# Patient Record
Sex: FEMALE | Race: BLACK OR AFRICAN AMERICAN | Employment: UNEMPLOYED | URBAN - METROPOLITAN AREA
[De-identification: names, ages, dates, MRNs, and addresses within clinical notes are randomized per-mention and may not be internally consistent; named-entity substitution may affect disease eponyms.]

---

## 2017-01-05 ENCOUNTER — PATIENT OUTREACH (OUTPATIENT)
Dept: FAMILY MEDICINE CLINIC | Age: 52
End: 2017-01-05

## 2017-01-05 DIAGNOSIS — I10 ESSENTIAL HYPERTENSION: Primary | ICD-10-CM

## 2017-01-05 NOTE — PROGRESS NOTES
Follow up Chronic Condition    Focus: Diabetes, Hypertension & Asthma    Referred by High Risk Registry. Pt has multiple comorbidities and has had confusion with her medications previously. DM- Last DM follow up was in March 2016 and last A1C was 6.4 in February 2016. Pt reports multiple possible symptoms of high blood sugar. Pt reports she has had a sore above her right ankle for several months now. Pt has run out of lancets and test strips to check her glucose. States she checked a week ago and BG was 159. Pt has concerns about potential side effects of metformin. HTN- Pt is taking her BP meds as prescribed. Requests clonidine and lovastatin refills. Will route request to provider. Pt not checking her BP. Pt reports since starting the spironolactone, she has not had any peripheral edema. Asthma- Pt states she was diagnosed with asthma in 2009. States she smoked for 26 years, quit date in 2010. Pt was approved to receive Advair 250/50 and Ventolin through the pharmacy assistance program in September 2016 but states she only received one shipment to her home. Emailed Karlos Hinojosa to send refills and gave pt phone number to order future refills. Pt states she needs yearly mammograms. She tried to schedule one where she usually goes at [facility] but was not allowed. Will follow up with provider to place order. Follow up appt with provider scheduled for 1/30/17 at 1330. Advised pt to address sore on leg with , to be sure to ask for diabetes supplies, and to address metformin concerns with .     Patient has my number if questions arise.

## 2017-01-07 RX ORDER — CLONIDINE HYDROCHLORIDE 0.1 MG/1
TABLET ORAL
Qty: 60 TAB | Refills: 3 | Status: SHIPPED | OUTPATIENT
Start: 2017-01-07 | End: 2017-01-30 | Stop reason: SDUPTHER

## 2017-01-07 RX ORDER — LOVASTATIN 20 MG/1
20 TABLET ORAL
Qty: 30 TAB | Refills: 3 | Status: SHIPPED | OUTPATIENT
Start: 2017-01-07 | End: 2017-01-30 | Stop reason: SDUPTHER

## 2017-01-12 ENCOUNTER — PATIENT OUTREACH (OUTPATIENT)
Dept: FAMILY MEDICINE CLINIC | Age: 52
End: 2017-01-12

## 2017-01-12 DIAGNOSIS — E13.9 DIABETES 1.5, MANAGED AS TYPE 2 (HCC): ICD-10-CM

## 2017-01-12 NOTE — PROGRESS NOTES
Called pt to inform her that an order was placed for her mammogram 8/17/16, so she should be able to call and schedule now. Gave pt number to call and pt states she will call now to schedule and will call me back if she has any issues. Pt picked up her clonidine and lovastatin. Requests metformin refill. Request routed to provider.

## 2017-01-13 RX ORDER — METFORMIN HYDROCHLORIDE 1000 MG/1
1000 TABLET ORAL 2 TIMES DAILY WITH MEALS
Qty: 60 TAB | Refills: 3 | Status: SHIPPED | OUTPATIENT
Start: 2017-01-13 | End: 2017-01-30 | Stop reason: SDUPTHER

## 2017-01-30 ENCOUNTER — OFFICE VISIT (OUTPATIENT)
Dept: FAMILY MEDICINE CLINIC | Age: 52
End: 2017-01-30

## 2017-01-30 VITALS
HEART RATE: 96 BPM | BODY MASS INDEX: 30.52 KG/M2 | HEIGHT: 71 IN | OXYGEN SATURATION: 100 % | RESPIRATION RATE: 16 BRPM | WEIGHT: 218 LBS | DIASTOLIC BLOOD PRESSURE: 91 MMHG | SYSTOLIC BLOOD PRESSURE: 128 MMHG | TEMPERATURE: 98 F

## 2017-01-30 DIAGNOSIS — R60.9 EDEMA, UNSPECIFIED TYPE: ICD-10-CM

## 2017-01-30 DIAGNOSIS — G44.329 CHRONIC POST-TRAUMATIC HEADACHE, NOT INTRACTABLE: ICD-10-CM

## 2017-01-30 DIAGNOSIS — Z00.00 ROUTINE CHECK-UP: Primary | ICD-10-CM

## 2017-01-30 DIAGNOSIS — I10 ESSENTIAL HYPERTENSION WITH GOAL BLOOD PRESSURE LESS THAN 130/85: ICD-10-CM

## 2017-01-30 DIAGNOSIS — E13.9 DIABETES 1.5, MANAGED AS TYPE 2 (HCC): ICD-10-CM

## 2017-01-30 DIAGNOSIS — M10.9 GOUT, UNSPECIFIED CAUSE, UNSPECIFIED CHRONICITY, UNSPECIFIED SITE: ICD-10-CM

## 2017-01-30 LAB — GLUCOSE POC: 102 MG/DL

## 2017-01-30 RX ORDER — NORTRIPTYLINE HYDROCHLORIDE 50 MG/1
50 CAPSULE ORAL
Qty: 30 CAP | Refills: 5 | Status: SHIPPED | OUTPATIENT
Start: 2017-01-30 | End: 2017-12-22 | Stop reason: SDUPTHER

## 2017-01-30 RX ORDER — CLONIDINE HYDROCHLORIDE 0.1 MG/1
TABLET ORAL
Qty: 60 TAB | Refills: 3 | Status: SHIPPED | OUTPATIENT
Start: 2017-01-30 | End: 2017-10-19 | Stop reason: SDUPTHER

## 2017-01-30 RX ORDER — NORTRIPTYLINE HYDROCHLORIDE 75 MG/1
75 CAPSULE ORAL DAILY
Qty: 60 CAP | Refills: 5 | Status: SHIPPED | OUTPATIENT
Start: 2017-01-30 | End: 2017-12-22 | Stop reason: SDUPTHER

## 2017-01-30 RX ORDER — METFORMIN HYDROCHLORIDE 1000 MG/1
1000 TABLET ORAL 2 TIMES DAILY WITH MEALS
Qty: 60 TAB | Refills: 3 | Status: SHIPPED | OUTPATIENT
Start: 2017-01-30 | End: 2017-05-21 | Stop reason: DRUGHIGH

## 2017-01-30 RX ORDER — ONDANSETRON HYDROCHLORIDE 8 MG/1
8 TABLET, FILM COATED ORAL
Qty: 12 TAB | Refills: 1 | Status: SHIPPED | OUTPATIENT
Start: 2017-01-30 | End: 2017-03-31 | Stop reason: SDUPTHER

## 2017-01-30 RX ORDER — LOVASTATIN 20 MG/1
20 TABLET ORAL
Qty: 30 TAB | Refills: 3 | Status: SHIPPED | OUTPATIENT
Start: 2017-01-30 | End: 2017-06-30 | Stop reason: SDUPTHER

## 2017-01-30 RX ORDER — ALLOPURINOL 100 MG/1
100 TABLET ORAL DAILY
Qty: 30 TAB | Refills: 5 | Status: SHIPPED | OUTPATIENT
Start: 2017-01-30 | End: 2017-12-22 | Stop reason: SDUPTHER

## 2017-01-30 RX ORDER — SPIRONOLACTONE 50 MG/1
50 TABLET, FILM COATED ORAL DAILY
Qty: 30 TAB | Refills: 5 | Status: SHIPPED | OUTPATIENT
Start: 2017-01-30 | End: 2017-02-28 | Stop reason: SDUPTHER

## 2017-01-30 RX ORDER — CARVEDILOL 25 MG/1
25 TABLET ORAL 2 TIMES DAILY
Qty: 60 TAB | Refills: 5 | Status: SHIPPED | OUTPATIENT
Start: 2017-01-30 | End: 2017-10-19 | Stop reason: SDUPTHER

## 2017-01-30 NOTE — MR AVS SNAPSHOT
Visit Information Date & Time Provider Department Dept. Phone Encounter #  
 1/30/2017  1:30 PM Sharyle Addison, [address] [place] [address]  [id number] Your Appointments 2/1/2017  4:00 PM  
Follow Up with KRAIG Bender [facility] ([facility]UCKER SCHEDULING) Appt Note: 2 mths 30 [facility] [address]  
[phone] [id number]  
  
    
 3/1/2017  4:00 PM  
Follow Up with Mary Blackmon MD  
[facility] Appt Note: 6mon f/u; 1/20 lm on vm to call ofc to r/s appt due to Dr. Merlin Layton being out of the ofc. ...ywp; r/s to this date & time w/pt [address] [facility] [id number] [phone]  
  
   
 [facility] [id number] Upcoming Health Maintenance Date Due  
 EYE EXAM RETINAL OR DILATED Q1 9/13/1977 Pneumococcal 19-64 Medium Risk (1 of 1 - PPSV23) 9/13/1986 FOOT EXAM Q1 6/30/2016 INFLUENZA AGE 9 TO ADULT 8/1/2016 HEMOGLOBIN A1C Q6M 8/9/2016 MICROALBUMIN Q1 8/31/2016 PAP AKA CERVICAL CYTOLOGY 11/20/2016 LIPID PANEL Q1 3/14/2017 DTaP/Tdap/Td series (2 - Td) 8/31/2025 Allergies as of 1/30/2017  Review Complete On: 1/30/2017 By: Nagi Garcia Severity Noted Reaction Type Reactions Latex  08/26/2014    Rash Lisinopril  06/07/2012   Intolerance Cough Ace Inhibitor - cough Current Immunizations  Reviewed on 8/31/2015 Name Date Influenza Vaccine 9/25/2015, 10/30/2013 Tdap 8/31/2015, 5/22/2014  6:52 PM  
  
 Not reviewed this visit You Were Diagnosed With   
  
 Codes Comments Routine check-up    -  Primary ICD-10-CM: Z00.00 ICD-9-CM: V70.0 Chronic post-traumatic headache, not intractable     ICD-10-CM: X02.175 ICD-9-CM: 339.22 Gout, unspecified cause, unspecified chronicity, unspecified site     ICD-10-CM: M10.9 ICD-9-CM: 274.9 Essential hypertension with goal blood pressure less than 130/85     ICD-10-CM: I10 
ICD-9-CM: 401.9 Edema, unspecified type     ICD-10-CM: R60.9 ICD-9-CM: 782.3 Diabetes 1.5, managed as type 2 ([facility]     ICD-10-CM: E13.9 ICD-9-CM: 250.00 Vitals BP Pulse Temp Resp Height(growth percentile) Weight(growth percentile) (!) 128/91 (BP 1 Location: Left arm, BP Patient Position: Sitting) 96 98 °F (36.7 °C) (Oral) 16 5' 11\" (1.803 m) 218 lb (98.9 kg) LMP SpO2 BMI OB Status Smoking Status 12/15/2016 (Approximate) 100% 30.4 kg/m2 Having regular periods Former Smoker Vitals History BMI and BSA Data Body Mass Index Body Surface Area  
 30.4 kg/m 2 2.23 m 2 Preferred Pharmacy Pharmacy Name Phone [facility] [address] [phone] Your Updated Medication List  
  
   
This list is accurate as of: 1/30/17  2:36 PM.  Always use your most recent med list.  
  
  
  
  
 ACIDOPHILUS 500 million cell Tab Generic drug:  Lactobacillus acidophilus Take  by mouth daily. albuterol 90 mcg/actuation inhaler Commonly known as:  PROVENTIL HFA, VENTOLIN HFA, PROAIR HFA Take 2 Puffs by inhalation every four (4) hours as needed for Wheezing. allopurinol 100 mg tablet Commonly known as:  Lonni Carreno Take 1 Tab by mouth daily. carvedilol 25 mg tablet Commonly known as:  Juline Balzarine Take 1 Tab by mouth two (2) times a day. cholecalciferol (VITAMIN D3) 5,000 unit Tab tablet Commonly known as:  VITAMIN D3 Take  by mouth daily. cloNIDine HCl 0.1 mg tablet Commonly known as:  CATAPRES  
TAKE ONE TABLET BY MOUTH TWICE DAILY  
  
 cyanocobalamin 2,000 mcg Tber Take  by mouth daily. glucose blood VI test strips strip Commonly known as:  RELION PRIME TEST STRIPS Use as directed * HYDROmorphone 8 mg Tb24 Commonly known asVern Sohan ER  
 Take 8 mg by mouth daily. Max Daily Amount: 8 mg. For chronic pain (due to fill 7/2/16) replaces opana ER  
  
 * HYDROmorphone ER 12 mg tablet Commonly known asVern Sohan ER Take 1 Tab by mouth daily. Max Daily Amount: 12 mg. Due to fill 9/15/16 For chronic pain  
  
 * HYDROmorphone ER 12 mg tablet Commonly known asVern Sohan ER Take 1 Tab by mouth daily for 30 days. Max Daily Amount: 12 mg. Due to fill 11/12/16 For chronic pain  Indications: CHRONIC PAIN WITH OPIOID TOLERANCE, SEVERE PAIN WITH OPIOID TOLERANCE  
  
 lovastatin 20 mg tablet Commonly known as:  MEVACOR Take 1 Tab by mouth nightly. metFORMIN 1,000 mg tablet Commonly known as:  GLUCOPHAGE Take 1 Tab by mouth two (2) times daily (with meals). multivitamin tablet Commonly known as:  ONE A DAY Take 1 tablet by mouth daily. * nortriptyline 50 mg capsule Commonly known as:  PAMELOR Take 1 Cap by mouth nightly. * nortriptyline 75 mg capsule Commonly known as:  PAMELOR Take 1 Cap by mouth daily. ondansetron hcl 8 mg tablet Commonly known as:  ZOFRAN (AS HYDROCHLORIDE) Take 1 Tab by mouth every eight (8) hours as needed for Nausea. OTHER Motorized Wheelchair  
  
 oxybutynin 5 mg tablet Commonly known as:  BSPYOEPY Take 1 Tab by mouth two (2) times a day. oxyCODONE IR 15 mg immediate release tablet Commonly known as:  Sabrina Nakai Take 1 Tab by mouth four (4) times daily for 30 days. Max Daily Amount: 60 mg. Prn severe pain   Indications: PAIN  
  
 oxyMORphone 20 mg ER tablet Commonly known as:  OPANA ER Take 20 mg by mouth every twelve (12) hours. spironolactone 50 mg tablet Commonly known as:  ALDACTONE Take 1 Tab by mouth daily. tiZANidine 2 mg tablet Commonly known as:  Kiana Ayan Take one to two tabs po tid prn muscle spasms  Indications: MUSCLE SPASM  
  
 VITAMIN C 250 mg tablet Generic drug:  ascorbic acid (vitamin C) Take  by mouth. * Notice: This list has 5 medication(s) that are the same as other medications prescribed for you. Read the directions carefully, and ask your doctor or other care provider to review them with you. Prescriptions Sent to Pharmacy Refills  
 nortriptyline (PAMELOR) 50 mg capsule 5 Sig: Take 1 Cap by mouth nightly. Class: Normal  
 Pharmacy: [address] [place] [address]. [address], [facility] 23. Ph #: [phone] Route: Oral  
 nortriptyline (PAMELOR) 75 mg capsule 5 Sig: Take 1 Cap by mouth daily. Class: Normal  
 Pharmacy: [address] [place] [address]. [address], [facility] 23. Ph #: [phone] Route: Oral  
 allopurinol (ZYLOPRIM) 100 mg tablet 5 Sig: Take 1 Tab by mouth daily. Class: Normal  
 Pharmacy: [address] [place] [address]. [address], [facility] 23. Ph #: [phone] Route: Oral  
 spironolactone (ALDACTONE) 50 mg tablet 5 Sig: Take 1 Tab by mouth daily. Class: Normal  
 Pharmacy: [address] [place] [address]. [address], [facility] 23. Ph #: [phone] Route: Oral  
 carvedilol (COREG) 25 mg tablet 5 Sig: Take 1 Tab by mouth two (2) times a day. Class: Normal  
 Pharmacy: [address] [place] [address]. [address], [facility] 23. Ph #: [phone] Route: Oral  
 lovastatin (MEVACOR) 20 mg tablet 3 Sig: Take 1 Tab by mouth nightly. Class: Normal  
 Pharmacy: [address] [place] [address]. [address], [facility] 23. Ph #: [phone] Route: Oral  
 cloNIDine HCl (CATAPRES) 0.1 mg tablet 3 Sig: TAKE ONE TABLET BY MOUTH TWICE DAILY Class: Normal  
 Pharmacy: [address] [place] [address]. [address], [facility] 23. Ph #: [phone]  
 metFORMIN (GLUCOPHAGE) 1,000 mg tablet 3 Sig: Take 1 Tab by mouth two (2) times daily (with meals). Class: Normal  
 Pharmacy: [address] 23. Ph #: [phone]  Route: Oral  
 ondansetron hcl (ZOFRAN, AS HYDROCHLORIDE,) 8 mg tablet 1 Sig: Take 1 Tab by mouth every eight (8) hours as needed for Nausea. Class: Normal  
 Pharmacy: [address] [facility]. Ph #: [phone] Route: Oral  
 glucose blood VI test strips (RELION PRIME TEST STRIPS) strip 2 Sig: Use as directed Class: Normal  
 Pharmacy: [address] [facility] 23. Ph #: [phone] We Performed the Following AMB POC GLUCOSE BLOOD, BY GLUCOSE MONITORING DEVICE [95413 CPT(R)] To-Do List   
 02/13/2017 5:00 PM  
  Appointment with MARIBEL JOHNSON RM 1 at [address] [place] [address] ([phone]) OUTSIDE FILMS  - Any outside films related to the study being scheduled should be brought with you on the day of the exam.  If this cannot be done there may be a delay in the reading of the study. MEDICATIONS  - Patient must bring a complete list of all medications currently taking to include prescriptions, over-the-counter meds, herbals, vitamins & any dietary supplements  GENERAL INSTRUCTIONS  - On the day of your exam do not use any bath powder, deodorant or lotions on the armpit area. -Tenderness of breasts may cause an increase of discomfort during procedure. If you are experiencing breast tenderness on the day of your appointment and would like to reschedule, please call [phone]. Patient Instructions You may qualify for a free eye exam through the [facility] club. Call [phone] to register. Introducing [facility]! Rufina Gilliam introduces [employer] patient portal. Now you can access parts of your medical record, email your doctor's office, and request medication refills online. 1. In your internet browser, go to [email or web address] 2. Click on the First Time User? Click Here link in the Sign In box. You will see the New Member Sign Up page. 3. Enter your [employer] Access Code exactly as it appears below. You will not need to use this code after youve completed the sign-up process. If you do not sign up before the expiration date, you must request a new code. · [employer] Access Code: [email or web address] Expires: 3/30/2017  3:03 PM 
 
4. Enter the last four digits of your Social Security Number (xxxx) and Date of Birth (mm/dd/yyyy) as indicated and click Submit. You will be taken to the next sign-up page. 5. Create a [employer] ID. This will be your [employer] login ID and cannot be changed, so think of one that is secure and easy to remember. 6. Create a [employer] password. You can change your password at any time. 7. Enter your Password Reset Question and Answer. This can be used at a later time if you forget your password. 8. Enter your e-mail address. You will receive e-mail notification when new information is available in [address] 9. Click Sign Up. You can now view and download portions of your medical record. 10. Click the Download Summary menu link to download a portable copy of your medical information. If you have questions, please visit the Frequently Asked Questions section of the [employer] website. Remember, [employer] is NOT to be used for urgent needs. For medical emergencies, dial 911. Now available from your iPhone and Android! Please provide this summary of care documentation to your next provider. Your primary care clinician is listed as [address]. If you have any questions after today's visit, please call [phone].

## 2017-01-30 NOTE — PATIENT INSTRUCTIONS
You may qualify for a free eye exam through the [employer] club. Call [phone] to register.

## 2017-01-30 NOTE — PROGRESS NOTES
HPI  Katelynn Ramirez is a 54 y.o. female being seen today for   Chief Complaint   Patient presents with    Medication Evaluation   . she states that she is out of pain meds due to a mixup with her workmans comp. Feeling nauseated since out of narcotics 2 weeks. Sees pain management. Needs refills on bp meds, nortriptyline, lovastatin    Past Medical History   Diagnosis Date    Abnormal nuclear cardiac imaging test 07/10/2012     Mod mid to distal anterior septal ischemia. EF 47%. Mid to distal anterior septal hypk. Normal EKG portion of stress test.  Intermediate high risk.  Asthma     Back pain      injury at work June 2014    Coronary atherosclerosis of native coronary artery      angiographically normal coronaries with dLAD bridging (july 2012)    Diabetes mellitus type II, controlled ([facility] [address] [facility]     Forgetfulness      since injury June 2014    Fracture of left humerus     GERD (gastroesophageal reflux disease)     Gout     Headache(784.0)     Hypertension     Lack of coordination      since injury June 2014    Neck pain      injury at work June 2014    Obesity     S/P cardiac cath 07/10/2012     dLAD w/mild to mod bridging. Otherwise patent coronaries. At least mod LVH. EF 65%. ROS  Patient states that she is feeling well. Denies complaints of chest pain, shortness of breath, swelling of legs, dizziness or weakness. she denies nausea, vomiting or diarrhea. Current Outpatient Prescriptions   Medication Sig    nortriptyline (PAMELOR) 50 mg capsule Take 1 Cap by mouth nightly.  nortriptyline (PAMELOR) 75 mg capsule Take 1 Cap by mouth daily.  allopurinol (ZYLOPRIM) 100 mg tablet Take 1 Tab by mouth daily.  spironolactone (ALDACTONE) 50 mg tablet Take 1 Tab by mouth daily.  carvedilol (COREG) 25 mg tablet Take 1 Tab by mouth two (2) times a day.  lovastatin (MEVACOR) 20 mg tablet Take 1 Tab by mouth nightly.     cloNIDine HCl (CATAPRES) 0.1 mg tablet TAKE ONE TABLET BY MOUTH TWICE DAILY    metFORMIN (GLUCOPHAGE) 1,000 mg tablet Take 1 Tab by mouth two (2) times daily (with meals).  ondansetron hcl (ZOFRAN, AS HYDROCHLORIDE,) 8 mg tablet Take 1 Tab by mouth every eight (8) hours as needed for Nausea.  glucose blood VI test strips (RELION PRIME TEST STRIPS) strip Use as directed    tiZANidine (ZANAFLEX) 2 mg tablet Take one to two tabs po tid prn muscle spasms  Indications: MUSCLE SPASM    HYDROmorphone ER (EXALGO ER) 12 mg tablet Take 1 Tab by mouth daily for 30 days. Max Daily Amount: 12 mg. Due to fill 11/12/16 For chronic pain  Indications: CHRONIC PAIN WITH OPIOID TOLERANCE, SEVERE PAIN WITH OPIOID TOLERANCE    oxyCODONE IR (ROXICODONE) 15 mg immediate release tablet Take 1 Tab by mouth four (4) times daily for 30 days. Max Daily Amount: 60 mg. Prn severe pain    Indications: PAIN    albuterol (PROVENTIL HFA, VENTOLIN HFA, PROAIR HFA) 90 mcg/actuation inhaler Take 2 Puffs by inhalation every four (4) hours as needed for Wheezing.  HYDROmorphone ER (EXALGO ER) 12 mg tablet Take 1 Tab by mouth daily. Max Daily Amount: 12 mg. Due to fill 9/15/16 For chronic pain    oxybutynin (DITROPAN) 5 mg tablet Take 1 Tab by mouth two (2) times a day.  HYDROmorphone (EXALGO ER) 8 mg Tb24 Take 8 mg by mouth daily. Max Daily Amount: 8 mg. For chronic pain (due to fill 7/2/16) replaces opana ER    OxyMORPhone 20 mg PO ER tablet Take 20 mg by mouth every twelve (12) hours.  OTHER Motorized Wheelchair    multivitamin (ONE A DAY) tablet Take 1 tablet by mouth daily.  ascorbic acid (VITAMIN C) 250 mg tablet Take  by mouth.  Cholecalciferol, Vitamin D3, 5,000 unit Tab Take  by mouth daily.  cyanocobalamin 2,000 mcg TbER Take  by mouth daily.  lactobacillus acidophilus (ACIDOPHILUS) 500 million cell Tab Take  by mouth daily. No current facility-administered medications for this visit.         PE  Visit Vitals    BP (!) 128/91 (BP 1 Location: Left arm, BP Patient Position: Sitting)    Pulse 96    Temp 98 °F (36.7 °C) (Oral)    Resp 16    Ht 5' 11\" (1.803 m)    Wt 218 lb (98.9 kg)    LMP 12/15/2016 (Approximate)    SpO2 100%    BMI 30.4 kg/m2        Alert and oriented with normal mood and affect. she is well developed and well nourished . Lungs are clear without wheezing. Heart rate is regular without murmurs or gallops. There is no lower extremity edema. Results for orders placed or performed in visit on 01/30/17   AMB POC GLUCOSE BLOOD, BY GLUCOSE MONITORING DEVICE   Result Value Ref Range    Glucose  mg/dL         Assessment and Plan:        ICD-10-CM ICD-9-CM    1. Routine check-up Z00.00 V70.0 AMB POC GLUCOSE BLOOD, BY GLUCOSE MONITORING DEVICE   2. Chronic post-traumatic headache, not intractable G44.329 339.22 nortriptyline (PAMELOR) 50 mg capsule      nortriptyline (PAMELOR) 75 mg capsule   3. Gout, unspecified cause, unspecified chronicity, unspecified site M10.9 274.9 allopurinol (ZYLOPRIM) 100 mg tablet   4. Essential hypertension with goal blood pressure less than 130/85 I10 401.9 spironolactone (ALDACTONE) 50 mg tablet   5. Edema, unspecified type R60.9 782.3 spironolactone (ALDACTONE) 50 mg tablet   6. Diabetes 1.5, managed as type 2 ([address] E13.9 250.00 metFORMIN (GLUCOPHAGE) 1,000 mg tablet     Refilled chronic rx  Pt will call workmans comp to straighten out pain rx  zofran prn in the meantime for nausea.      Due for pap, schedule WWE  Last pap with low grade changes, had follow up bx neg for malignancy    Enmanuel Farias MD

## 2017-01-30 NOTE — PROGRESS NOTES
Discharge instructions reviewed with patient  [facility] appt scheduled  Medication list and understanding of medications reviewed with patient. OTC and herbal medications reviewed and added to med list if applicable  Barriers to adherence assessed. Guidance given regarding new medications this visit, including reason for taking this medicine, and common side effects.

## 2017-02-01 ENCOUNTER — OFFICE VISIT (OUTPATIENT)
Dept: PAIN MANAGEMENT | Age: 52
End: 2017-02-01

## 2017-02-01 VITALS
WEIGHT: 218 LBS | BODY MASS INDEX: 30.4 KG/M2 | SYSTOLIC BLOOD PRESSURE: 152 MMHG | DIASTOLIC BLOOD PRESSURE: 92 MMHG | HEART RATE: 81 BPM

## 2017-02-01 DIAGNOSIS — Z79.891 ENCOUNTER FOR LONG-TERM OPIATE ANALGESIC USE: ICD-10-CM

## 2017-02-01 DIAGNOSIS — G44.309 POST-CONCUSSION HEADACHE: ICD-10-CM

## 2017-02-01 DIAGNOSIS — Z79.899 ENCOUNTER FOR LONG-TERM (CURRENT) USE OF MEDICATIONS: ICD-10-CM

## 2017-02-01 DIAGNOSIS — M51.37 DEGENERATION OF LUMBAR OR LUMBOSACRAL INTERVERTEBRAL DISC: ICD-10-CM

## 2017-02-01 DIAGNOSIS — G89.4 CHRONIC PAIN SYNDROME: Primary | ICD-10-CM

## 2017-02-01 DIAGNOSIS — G57.92 NEURITIS OF LOWER EXTREMITY, LEFT: ICD-10-CM

## 2017-02-01 DIAGNOSIS — F51.04 CHRONIC INSOMNIA: ICD-10-CM

## 2017-02-01 LAB
ALCOHOL UR POC: NORMAL
AMPHETAMINES UR POC: NEGATIVE
BARBITURATES UR POC: NEGATIVE
BENZODIAZEPINES UR POC: NORMAL
BUPRENORPHINE UR POC: NORMAL
CANNABINOIDS UR POC: NEGATIVE
CARISOPRODOL UR POC: NORMAL
COCAINE UR POC: NEGATIVE
FENTANYL UR POC: NORMAL
MDMA/ECSTASY UR POC: NEGATIVE
METHADONE UR POC: NEGATIVE
METHAMPHETAMINE UR POC: NEGATIVE
METHYLPHENIDATE UR POC: NEGATIVE
OPIATES UR POC: NEGATIVE
OXYCODONE UR POC: NORMAL
PHENCYCLIDINE UR POC: NORMAL
PROPOXYPHENE UR POC: NORMAL
TRAMADOL UR POC: NORMAL
TRICYCLICS UR POC: NEGATIVE

## 2017-02-01 RX ORDER — HYDROMORPHONE HYDROCHLORIDE 12 MG/1
12 TABLET, EXTENDED RELEASE ORAL DAILY
Qty: 30 TAB | Refills: 0 | Status: SHIPPED | OUTPATIENT
Start: 2017-02-01 | End: 2017-02-01 | Stop reason: SDUPTHER

## 2017-02-01 RX ORDER — OXYCODONE HYDROCHLORIDE 15 MG/1
15 TABLET ORAL 4 TIMES DAILY
Qty: 120 TAB | Refills: 0 | Status: SHIPPED | OUTPATIENT
Start: 2017-02-01 | End: 2017-02-01 | Stop reason: SDUPTHER

## 2017-02-01 RX ORDER — OXYCODONE HYDROCHLORIDE 15 MG/1
15 TABLET ORAL 4 TIMES DAILY
Qty: 120 TAB | Refills: 0 | Status: SHIPPED | OUTPATIENT
Start: 2017-02-01 | End: 2017-03-30 | Stop reason: SDUPTHER

## 2017-02-01 RX ORDER — HYDROMORPHONE HYDROCHLORIDE 12 MG/1
12 TABLET, EXTENDED RELEASE ORAL DAILY
Qty: 30 TAB | Refills: 0 | Status: SHIPPED | OUTPATIENT
Start: 2017-02-01 | End: 2017-03-03

## 2017-02-01 NOTE — PROGRESS NOTES
HISTORY OF PRESENT ILLNESS  Marilynn Cota is a 54 y.o. female. HPI  The patient presents today for follow up of chronic severe pain which is widespread and includes headaches, pain in the cervical spine and intrascapular region, lumbar spine pain radiating down the right posterolateral thigh into the calf and foot. She reports her WC just filled her exalgo ER on 1/31/17, was supposed to be filled 1/10/17. Her WC asked for the FCE which the patient reports WC did not approve when I ordered it. She reports she has had two FCE's prior to this which have not shown she is able to work. She has contacted her  about the troubles she is having with WC. She reports her pain has considerably increased since her medication was not refilled on the tenth of January. She is also advised that she cannot stop and start her medication (the long acting exalgo due to risk for accidental overdose when she restarts it). Raul Rene is her . I would be happy to speak to him. ([phone])    She reports 50% relief when she is taking her exalgo 12 mg once daily as prescribed with the oxycodone for breakthrough pain up to four times per day. She is in obvious pain today and very upset about what is happening. She reports she is able to function with the medication as prescribed and do what she needs to do each day. She has still not received the motorized wheelchair/scooter. Her  is working on this. She takes amitriptyline for sleep. She denies constipation.  appears consistent yet patient has had trouble with filling her exalgo with WC. Did not get [facility]. Review of Systems   Constitutional: Negative for chills, fever, malaise/fatigue and weight loss. HENT: Negative for congestion and sore throat. Eyes: Positive for blurred vision. Negative for double vision. Respiratory: Negative for cough and shortness of breath.          Nonsmoker (quit 15 years ago) Cardiovascular: Positive for leg swelling (right leg). Negative for chest pain. Gastrointestinal: Positive for nausea. Negative for constipation, diarrhea, heartburn and vomiting. Musculoskeletal: Positive for back pain, falls (no ED visit/stumbles often/fall risk), joint pain, myalgias and neck pain. Skin: Negative. Neurological: Positive for tingling, sensory change, speech change (slow/difficult to understand (advised to see neurology which she has)), weakness and headaches. Negative for dizziness (lightheaded occasional), tremors and seizures. Endo/Heme/Allergies: Positive for environmental allergies. Bruises/bleeds easily. Psychiatric/Behavioral: Positive for depression. Negative for suicidal ideas. The patient is nervous/anxious and has insomnia. Sees psychiatry       Physical Exam   Constitutional: She is oriented to person, place, and time. She appears well-developed and well-nourished. She appears distressed ( in pain). Slurred, slowed speech (unchanged per patient)  Weakness in both upper and lower extremities/gait unstable/walks slowly with cane   HENT:   Head: Normocephalic. Eyes: Conjunctivae and EOM are normal. Pupils are equal, round, and reactive to light.   glasses   Neck: Normal range of motion. Painful ROM   Pulmonary/Chest: Effort normal. No respiratory distress. Musculoskeletal: She exhibits tenderness (neck/lumbar). She exhibits no edema. Cervical back: She exhibits decreased range of motion, tenderness, pain and spasm. Lumbar back: She exhibits decreased range of motion, tenderness, pain and spasm. Neurological: She is alert and oriented to person, place, and time. Gait (antalgic/uses cane) abnormal.   Psychiatric: She has a normal mood and affect. Her behavior is normal. Judgment and thought content normal.   Nursing note and vitals reviewed. The patient was advised about elevated blood pressure to discuss with pcp.       ASSESSMENT and PLAN  Encounter Diagnoses   Name Primary?  Chronic pain syndrome Yes    Encounter for long-term (current) use of medications     Post-concussion headache     Neuritis of lower extremity, left     Degeneration of lumbar or lumbosacral intervertebral disc     Encounter for long-term opiate analgesic use     Chronic insomnia      Orders Placed This Encounter    DRUG SCREEN    AMB POC DRUG SCREEN ()    DISCONTD: HYDROmorphone ER (EXALGO ER) 12 mg tablet    DISCONTD: oxyCODONE IR (ROXICODONE) 15 mg immediate release tablet    HYDROmorphone ER (EXALGO ER) 12 mg tablet    oxyCODONE IR (ROXICODONE) 15 mg immediate release tablet     Patient Instructions   1. Continue medications as prescribed. 2.  Follow up in two months    40 minutes spent in visit face to face with the patient. >50% of time spent counseling and coordinating care  Per Dr. Shlomo Duverney notes last visit  \"She will continue on her current analgesic regimen as this is providing adequate pain control with minimal side effects. It is clear that, given the length of time since her injury and the nature of her injury, she has reached maximal medical improvement and the focus is on symptomatic treatment at the present time. She has also been on stable dosing of medication with a morphine equivalent daily dose approximately of 138 mg per day. He is not exhibiting any ill effects from the medication and it is providing 50% or more total relief. \"

## 2017-02-01 NOTE — PROGRESS NOTES
Nursing Notes    Patient presents to the office today in follow-up. Patient rates her pain at 8/10 on the numerical pain scale. Reviewed medications with counts as follows:    Rx Date filled Qty Dispensed Pill Count Last Dose Short     Oxycodone 15mg  01/13/2017 120 40 Today  No    Hydromorphone 12mg ER 01/31/2017 30 30 01/11/2017 No                                     Comments:     POC UDS was performed in office today    Any new labs or imaging since last appointment? NO    Have you been to an emergency room (ER) or urgent care clinic since your last visit? NO            Have you been hospitalized since your last visit? NO     If yes, where, when, and reason for visit? Have you seen or consulted any other health care providers outside of the [address] [place] [address]  since your last visit? YES     If yes, where, when, and reason for visit? pcp            HM deferred to pcp.

## 2017-02-01 NOTE — MR AVS SNAPSHOT
Visit Information Date & Time Provider Department Dept. Phone Encounter #  
 2/1/2017  4:00 PM Naima Garay, [address] [place] [address] for Pain Management [phone] [id number] Follow-up Instructions Return in about 2 months (around 4/1/2017). Your Appointments 3/1/2017  4:00 PM  
Follow Up with Bernadette Hall MD  
[address]) Appt Note: 6mon f/u; 1/20 lm on  to call [facility] to r/s appt due to Dr. Francois Gonzalez being out of the [facility]. ...ywp; r/s to this date & time w/pt [address] [id number] [phone]  
  
   
 [place] [id number] Upcoming Health Maintenance Date Due  
 EYE EXAM RETINAL OR DILATED Q1 9/13/1977 Pneumococcal 19-64 Medium Risk (1 of 1 - PPSV23) 9/13/1986 FOOT EXAM Q1 6/30/2016 INFLUENZA AGE 9 TO ADULT 8/1/2016 HEMOGLOBIN A1C Q6M 8/9/2016 MICROALBUMIN Q1 8/31/2016 PAP AKA CERVICAL CYTOLOGY 11/20/2016 LIPID PANEL Q1 3/14/2017 DTaP/Tdap/Td series (2 - Td) 8/31/2025 Allergies as of 2/1/2017  Review Complete On: 2/1/2017 By: KRAIG Burciaga Severity Noted Reaction Type Reactions Latex  08/26/2014    Rash Lisinopril  06/07/2012   Intolerance Cough Ace Inhibitor - cough Current Immunizations  Reviewed on 8/31/2015 Name Date Influenza Vaccine 9/25/2015, 10/30/2013 Tdap 8/31/2015, 5/22/2014  6:52 PM  
  
 Not reviewed this visit You Were Diagnosed With   
  
 Codes Comments Chronic pain syndrome    -  Primary ICD-10-CM: G89.4 ICD-9-CM: 338.4 Encounter for long-term (current) use of medications     ICD-10-CM: Z79.899 ICD-9-CM: V58.69 Post-concussion headache     ICD-10-CM: F02.439 ICD-9-CM: 339.20 Neuritis of lower extremity, left     ICD-10-CM: G57.92 
ICD-9-CM: 355.8  Degeneration of lumbar or lumbosacral intervertebral disc     ICD-10-CM: M51.37 
ICD-9-CM: 722.52   
 Encounter for long-term opiate analgesic use     ICD-10-CM: Q08.567 ICD-9-CM: V58.69 Chronic insomnia     ICD-10-CM: F51.04 
ICD-9-CM: 780.52 Vitals BP Pulse Weight(growth percentile) LMP BMI OB Status (!) 152/92 (BP 1 Location: Left arm, BP Patient Position: Sitting) 81 218 lb (98.9 kg) 12/15/2016 30.4 kg/m2 Having regular periods Smoking Status Former Smoker Vitals History BMI and BSA Data Body Mass Index Body Surface Area  
 30.4 kg/m 2 2.23 m 2 Preferred Pharmacy Pharmacy Name Phone [facility] [address] [phone] Your Updated Medication List  
  
   
This list is accurate as of: 2/1/17  5:23 PM.  Always use your most recent med list.  
  
  
  
  
 ACIDOPHILUS 500 million cell Tab Generic drug:  Lactobacillus acidophilus Take  by mouth daily. albuterol 90 mcg/actuation inhaler Commonly known as:  PROVENTIL HFA, VENTOLIN HFA, PROAIR HFA Take 2 Puffs by inhalation every four (4) hours as needed for Wheezing. allopurinol 100 mg tablet Commonly known as:  Rawland Leonor Take 1 Tab by mouth daily. carvedilol 25 mg tablet Commonly known as:  Wileen Monzon Take 1 Tab by mouth two (2) times a day. cholecalciferol (VITAMIN D3) 5,000 unit Tab tablet Commonly known as:  VITAMIN D3 Take  by mouth daily. cloNIDine HCl 0.1 mg tablet Commonly known as:  CATAPRES  
TAKE ONE TABLET BY MOUTH TWICE DAILY  
  
 cyanocobalamin 2,000 mcg Tber Take  by mouth daily. glucose blood VI test strips strip Commonly known as:  RELION PRIME TEST STRIPS Use as directed * HYDROmorphone 8 mg Tb24 Commonly known asWilkins Cerise ER Take 8 mg by mouth daily. Max Daily Amount: 8 mg. For chronic pain (due to fill 7/2/16) replaces opana ER  
  
 * HYDROmorphone ER 12 mg tablet Commonly known asWilkins Cerise ER  
 Take 1 Tab by mouth daily. Max Daily Amount: 12 mg. Due to fill 9/15/16 For chronic pain  
  
 * HYDROmorphone ER 12 mg tablet Commonly known asWilkins Cerise ER Take 1 Tab by mouth daily for 30 days. Max Daily Amount: 12 mg. Due to fill 3/30/17 For chronic pain  Indications: Chronic Pain with Opioid Tolerance, SEVERE PAIN WITH OPIOID TOLERANCE  
  
 lovastatin 20 mg tablet Commonly known as:  MEVACOR Take 1 Tab by mouth nightly. metFORMIN 1,000 mg tablet Commonly known as:  GLUCOPHAGE Take 1 Tab by mouth two (2) times daily (with meals). multivitamin tablet Commonly known as:  ONE A DAY Take 1 tablet by mouth daily. * nortriptyline 50 mg capsule Commonly known as:  PAMELOR Take 1 Cap by mouth nightly. * nortriptyline 75 mg capsule Commonly known as:  PAMELOR Take 1 Cap by mouth daily. ondansetron hcl 8 mg tablet Commonly known as:  ZOFRAN (AS HYDROCHLORIDE) Take 1 Tab by mouth every eight (8) hours as needed for Nausea. OTHER Motorized Wheelchair  
  
 oxybutynin 5 mg tablet Commonly known as:  CSAPMIFH Take 1 Tab by mouth two (2) times a day. oxyCODONE IR 15 mg immediate release tablet Commonly known as:  Charolet Music Take 1 Tab by mouth four (4) times daily for 30 days. Max Daily Amount: 60 mg. Prn severe pain due to fill 3/12/17  Indications: Pain  
  
 oxyMORphone 20 mg ER tablet Commonly known as:  OPANA ER Take 20 mg by mouth every twelve (12) hours. spironolactone 50 mg tablet Commonly known as:  ALDACTONE Take 1 Tab by mouth daily. tiZANidine 2 mg tablet Commonly known as:  Elenore Piggs Take one to two tabs po tid prn muscle spasms  Indications: MUSCLE SPASM  
  
 VITAMIN C 250 mg tablet Generic drug:  ascorbic acid (vitamin C) Take  by mouth. * Notice: This list has 5 medication(s) that are the same as other medications prescribed for you.  Read the directions carefully, and ask your doctor or other care provider to review them with you. Prescriptions Printed Refills HYDROmorphone ER (EXALGO ER) 12 mg tablet 0 Sig: Take 1 Tab by mouth daily for 30 days. Max Daily Amount: 12 mg. Due to fill 3/30/17 For chronic pain  Indications: Chronic Pain with Opioid Tolerance, SEVERE PAIN WITH OPIOID TOLERANCE Class: Print Route: Oral  
 oxyCODONE IR (ROXICODONE) 15 mg immediate release tablet 0 Sig: Take 1 Tab by mouth four (4) times daily for 30 days. Max Daily Amount: 60 mg. Prn severe pain due to fill 3/12/17  Indications: Pain Class: Print Route: Oral  
  
We Performed the Following AMB POC DRUG SCREEN () [ [facility]] DRUG SCREEN [[id number] Custom] Follow-up Instructions Return in about 2 months (around 4/1/2017). To-Do List   
 02/13/2017 5:00 PM  
  Appointment with MARIBEL JOHNSON RM 1 at [address] [place] [address] ([phone]) OUTSIDE FILMS  - Any outside films related to the study being scheduled should be brought with you on the day of the exam.  If this cannot be done there may be a delay in the reading of the study. MEDICATIONS  - Patient must bring a complete list of all medications currently taking to include prescriptions, over-the-counter meds, herbals, vitamins & any dietary supplements  GENERAL INSTRUCTIONS  - On the day of your exam do not use any bath powder, deodorant or lotions on the armpit area. -Tenderness of breasts may cause an increase of discomfort during procedure. If you are experiencing breast tenderness on the day of your appointment and would like to reschedule, please call [phone]. Patient Instructions 1. Continue medications as prescribed. 2.  Follow up in two months Introducing [facility]! [facility] introduces [employer] patient portal. Now you can access parts of your medical record, email your doctor's office, and request medication refills online. 1. In your internet browser, go to [email or web address] 2. Click on the First Time User? Click Here link in the Sign In box. You will see the New Member Sign Up page. 3. Enter your [employer] Access Code exactly as it appears below. You will not need to use this code after youve completed the sign-up process. If you do not sign up before the expiration date, you must request a new code. · [employer] Access Code: [email or web address] Expires: 3/30/2017  3:03 PM 
 
4. Enter the last four digits of your Social Security Number (xxxx) and Date of Birth (mm/dd/yyyy) as indicated and click Submit. You will be taken to the next sign-up page. 5. Create a [employer] ID. This will be your [employer] login ID and cannot be changed, so think of one that is secure and easy to remember. 6. Create a [employer] password. You can change your password at any time. 7. Enter your Password Reset Question and Answer. This can be used at a later time if you forget your password. 8. Enter your e-mail address. You will receive e-mail notification when new information is available in [address] 9. Click Sign Up. You can now view and download portions of your medical record. 10. Click the Download Summary menu link to download a portable copy of your medical information. If you have questions, please visit the Frequently Asked Questions section of the [employer] website. Remember, [employer] is NOT to be used for urgent needs. For medical emergencies, dial 911. Now available from your iPhone and Android! Please provide this summary of care documentation to your next provider. Your primary care clinician is listed as [address]. If you have any questions after today's visit, please call [phone].

## 2017-02-03 ENCOUNTER — PATIENT OUTREACH (OUTPATIENT)
Dept: FAMILY MEDICINE CLINIC | Age: 52
End: 2017-02-03

## 2017-02-03 NOTE — LETTER
2/7/2017 2:23 PM 
 
Ms. Shaji Montero [address] Dear Shaji Winston I am the Nurse Navigator working with the [address] [place] [address]. I have been unable to reach you by phone. I would like to follow up with you when you have some time. You can reach me directly at [phone]. Thank you for allowing us to participate in your care!  
 
 
 
Sincerely, 
 
 
Brianna Rossi RN

## 2017-02-03 NOTE — PROGRESS NOTES
Follow up Chronic Condition    Focus: Diabetes, Hypertension & Asthma    Unable to reach pt by phone. Letter mailed to pt. Patient has my number if questions arise.

## 2017-02-27 ENCOUNTER — PATIENT OUTREACH (OUTPATIENT)
Dept: FAMILY MEDICINE CLINIC | Age: 52
End: 2017-02-27

## 2017-02-27 NOTE — PROGRESS NOTES
Follow up Chronic Condition    Focus: Diabetes, Hypertension, and Asthma    Diabetes  Pt reports her BG's generally range from 125-170's. Pt is taking metformin as prescribed. Pt reports no lows. Reports she has had decreased appetite since January and skips meals sometimes but BG stays in the low 100's. Hypertension  Pt has not been checking her BP's. Pt does report some BLE swelling. Reports she is out of spironolactone. States that she has never picked up the 50mg script that was prescribed in August 2016. She has only been refilling the 25mg. States the 50mg is too expensive but the 25mg is on the $4 list. Will notify provider that pt requests 25mg tablets. Asthma  Pt received her Advair and Ventolin in the mail at her home and has been taking as prescribed. Pt aware she will need to call [facility] for refills before she runs out. Gave pt number for [facility] to schedule [facility]. Will follow up with pt in 2 weeks to check her BP's on the higher dosage of spironolactone. Patient has my number if questions arise.

## 2017-03-01 ENCOUNTER — OFFICE VISIT (OUTPATIENT)
Dept: NEUROLOGY | Age: 52
End: 2017-03-01

## 2017-03-01 VITALS
OXYGEN SATURATION: 94 % | DIASTOLIC BLOOD PRESSURE: 92 MMHG | HEART RATE: 94 BPM | TEMPERATURE: 98.4 F | RESPIRATION RATE: 14 BRPM | SYSTOLIC BLOOD PRESSURE: 130 MMHG | HEIGHT: 71 IN | BODY MASS INDEX: 31.95 KG/M2 | WEIGHT: 228.2 LBS

## 2017-03-01 DIAGNOSIS — G44.329 CHRONIC POST-TRAUMATIC HEADACHE, NOT INTRACTABLE: Primary | ICD-10-CM

## 2017-03-01 NOTE — MR AVS SNAPSHOT
Visit Information Date & Time Provider Department Dept. Phone Encounter #  
 3/1/2017  4:00 PM Leigh Ann Preston, [facility] [phone] [id number] Follow-up Instructions Return in about 6 months (around 9/1/2017). Your Appointments 3/30/2017  4:20 PM  
Follow Up with KRAIG Cunningham [facility] (TUCK[facility] SCHEDULING) Appt Note: return in 2  
 [address] [facility] [address]  
[phone] [address]  
  
    
 9/5/2017  4:00 PM  
Follow Up with Leigh Ann Preston MD  
[facility]) Appt Note: 6mon f/u  
 [address] [address] [phone]  
  
   
 [facility] [address] Upcoming Health Maintenance Date Due  
 EYE EXAM RETINAL OR DILATED Q1 9/13/1977 Pneumococcal 19-64 Medium Risk (1 of 1 - PPSV23) 9/13/1986 FOOT EXAM Q1 6/30/2016 INFLUENZA AGE 9 TO ADULT 8/1/2016 HEMOGLOBIN A1C Q6M 8/9/2016 MICROALBUMIN Q1 8/31/2016 PAP AKA CERVICAL CYTOLOGY 11/20/2016 LIPID PANEL Q1 3/14/2017 DTaP/Tdap/Td series (2 - Td) 8/31/2025 Allergies as of 3/1/2017  Review Complete On: 3/1/2017 By: Solis Law LPN Severity Noted Reaction Type Reactions Latex  08/26/2014    Rash Lisinopril  06/07/2012   Intolerance Cough Ace Inhibitor - cough Current Immunizations  Reviewed on 8/31/2015 Name Date Influenza Vaccine 9/25/2015, 10/30/2013 Tdap 8/31/2015, 5/22/2014  6:52 PM  
  
 Not reviewed this visit Vitals BP  
  
  
  
  
  
 (!) 130/92 (BP 1 Location: Right arm, BP Patient Position: Sitting) Vitals History BMI and BSA Data Body Mass Index Body Surface Area  
 31.83 kg/m 2 2.28 m 2 Preferred Pharmacy Pharmacy Name Phone [id number] [phone] [phone] Your Updated Medication List  
  
   
This list is accurate as of: 3/1/17  4:29 PM.  Always use your most recent med list.  
  
  
  
  
 ACIDOPHILUS 500 million cell Tab Generic drug:  Lactobacillus acidophilus Take  by mouth daily. albuterol 90 mcg/actuation inhaler Commonly known as:  PROVENTIL HFA, VENTOLIN HFA, PROAIR HFA Take 2 Puffs by inhalation every four (4) hours as needed for Wheezing. allopurinol 100 mg tablet Commonly known as:  Clementeen Caldera Take 1 Tab by mouth daily. carvedilol 25 mg tablet Commonly known as:  Suze Spry Take 1 Tab by mouth two (2) times a day. cholecalciferol (VITAMIN D3) 5,000 unit Tab tablet Commonly known as:  VITAMIN D3 Take  by mouth daily. cloNIDine HCl 0.1 mg tablet Commonly known as:  CATAPRES  
TAKE ONE TABLET BY MOUTH TWICE DAILY  
  
 cyanocobalamin 2,000 mcg Tber Take  by mouth daily. glucose blood VI test strips strip Commonly known as:  RELION PRIME TEST STRIPS Use as directed * HYDROmorphone 8 mg Tb24 Commonly known asCaro [place] ER Take 8 mg by mouth daily. Max Daily Amount: 8 mg. For chronic pain (due to fill 7/2/16) replaces opana ER  
  
 * HYDROmorphone ER 12 mg tablet Commonly known asCaro [place] ER Take 1 Tab by mouth daily. Max Daily Amount: 12 mg. Due to fill 9/15/16 For chronic pain  
  
 * HYDROmorphone ER 12 mg tablet Commonly known asCaro [place] ER Take 1 Tab by mouth daily for 30 days. Max Daily Amount: 12 mg. Due to fill 3/30/17 For chronic pain  Indications: Chronic Pain with Opioid Tolerance, SEVERE PAIN WITH OPIOID TOLERANCE  
  
 lovastatin 20 mg tablet Commonly known as:  MEVACOR Take 1 Tab by mouth nightly. metFORMIN 1,000 mg tablet Commonly known as:  GLUCOPHAGE Take 1 Tab by mouth two (2) times daily (with meals). multivitamin tablet Commonly known as:  ONE A DAY Take 1 tablet by mouth daily. * nortriptyline 50 mg capsule Commonly known as:  PAMELOR Take 1 Cap by mouth nightly. * nortriptyline 75 mg capsule Commonly known as:  PAMELOR Take 1 Cap by mouth daily. ondansetron hcl 8 mg tablet Commonly known as:  ZOFRAN (AS HYDROCHLORIDE) Take 1 Tab by mouth every eight (8) hours as needed for Nausea. OTHER Motorized Wheelchair  
  
 oxybutynin 5 mg tablet Commonly known as:  YDUNGGXZ Take 1 Tab by mouth two (2) times a day. oxyCODONE IR 15 mg immediate release tablet Commonly known as:  Danne Copper Take 1 Tab by mouth four (4) times daily for 30 days. Max Daily Amount: 60 mg. Prn severe pain due to fill 3/12/17  Indications: Pain  
  
 oxyMORphone 20 mg ER tablet Commonly known as:  OPANA ER Take 20 mg by mouth every twelve (12) hours. spironolactone 25 mg tablet Commonly known as:  ALDACTONE Take 2 Tabs by mouth daily. tiZANidine 2 mg tablet Commonly known as:  Karla Muscat Take one to two tabs po tid prn muscle spasms  Indications: MUSCLE SPASM  
  
 VITAMIN C 250 mg tablet Generic drug:  ascorbic acid (vitamin C) Take  by mouth. * Notice: This list has 5 medication(s) that are the same as other medications prescribed for you. Read the directions carefully, and ask your doctor or other care provider to review them with you. Follow-up Instructions Return in about 6 months (around 9/1/2017). To-Do List   
 03/10/2017 4:45 PM  
  Appointment with MARIBEL JOHNSON  1 at [address] [place] [address] ([phone]) OUTSIDE FILMS  - Any outside films related to the study being scheduled should be brought with you on the day of the exam.  If this cannot be done there may be a delay in the reading of the study.   MEDICATIONS  - Patient must bring a complete list of all medications currently taking to include prescriptions, over-the-counter meds, herbals, vitamins & any dietary supplements  GENERAL INSTRUCTIONS  - On the day of your exam do not use any bath powder, deodorant or lotions on the armpit area. -Tenderness of breasts may cause an increase of discomfort during procedure. If you are experiencing breast tenderness on the day of your appointment and would like to reschedule, please call [phone]. Introducing [facility]! [facility] introduces [employer] patient portal. Now you can access parts of your medical record, email your doctor's office, and request medication refills online. 1. In your internet browser, go to [email or web address] 2. Click on the First Time User? Click Here link in the Sign In box. You will see the New Member Sign Up page. 3. Enter your [employer] Access Code exactly as it appears below. You will not need to use this code after youve completed the sign-up process. If you do not sign up before the expiration date, you must request a new code. · [employer] Access Code: [email or web address] Expires: 3/30/2017  3:03 PM 
 
4. Enter the last four digits of your Social Security Number (xxxx) and Date of Birth (mm/dd/yyyy) as indicated and click Submit. You will be taken to the next sign-up page. 5. Create a [employer] ID. This will be your [employer] login ID and cannot be changed, so think of one that is secure and easy to remember. 6. Create a [employer] password. You can change your password at any time. 7. Enter your Password Reset Question and Answer. This can be used at a later time if you forget your password. 8. Enter your e-mail address. You will receive e-mail notification when new information is available in [address] 9. Click Sign Up. You can now view and download portions of your medical record. 10. Click the Download Summary menu link to download a portable copy of your medical information.  
 
If you have questions, please visit the Frequently Asked Questions section of the [employer]. Remember, [employer] is NOT to be used for urgent needs. For medical emergencies, dial 911. Now available from your iPhone and Android! Please provide this summary of care documentation to your next provider. Your primary care clinician is listed as [address]. If you have any questions after today's visit, please call [phone].

## 2017-03-01 NOTE — PROGRESS NOTES
Re:  Chriss Rangel,Follow up visit     3/1/2017 4:20 PM      SSN: [id number]    Subjective:   Ishmael Christianson returns today. She's been treated by the pain clinic for the last year with only marginal improvement. She still has a lot of pain in the back and down the right leg. He has been falling because of weakness of the right leg. The pain clinic is going to be getting a hoverround because of the chronic falling. She complains of terrible headaches. She has daily headaches, the medications do not seem to be working at all. Medications:    Current Outpatient Prescriptions   Medication Sig Dispense Refill    spironolactone (ALDACTONE) 25 mg tablet Take 2 Tabs by mouth daily. 60 Tab 3    HYDROmorphone ER (EXALGO ER) 12 mg tablet Take 1 Tab by mouth daily for 30 days. Max Daily Amount: 12 mg. Due to fill 3/30/17 For chronic pain  Indications: Chronic Pain with Opioid Tolerance, SEVERE PAIN WITH OPIOID TOLERANCE 30 Tab 0    oxyCODONE IR (ROXICODONE) 15 mg immediate release tablet Take 1 Tab by mouth four (4) times daily for 30 days. Max Daily Amount: 60 mg. Prn severe pain due to fill 3/12/17  Indications: Pain 120 Tab 0    nortriptyline (PAMELOR) 50 mg capsule Take 1 Cap by mouth nightly. 30 Cap 5    nortriptyline (PAMELOR) 75 mg capsule Take 1 Cap by mouth daily. 60 Cap 5    allopurinol (ZYLOPRIM) 100 mg tablet Take 1 Tab by mouth daily. 30 Tab 5    carvedilol (COREG) 25 mg tablet Take 1 Tab by mouth two (2) times a day. 60 Tab 5    lovastatin (MEVACOR) 20 mg tablet Take 1 Tab by mouth nightly. 30 Tab 3    cloNIDine HCl (CATAPRES) 0.1 mg tablet TAKE ONE TABLET BY MOUTH TWICE DAILY 60 Tab 3    metFORMIN (GLUCOPHAGE) 1,000 mg tablet Take 1 Tab by mouth two (2) times daily (with meals). 60 Tab 3    ondansetron hcl (ZOFRAN, AS HYDROCHLORIDE,) 8 mg tablet Take 1 Tab by mouth every eight (8) hours as needed for Nausea.  12 Tab 1    glucose blood VI test strips (RELION PRIME TEST STRIPS) strip Use as directed 50 Strip 2    tiZANidine (ZANAFLEX) 2 mg tablet Take one to two tabs po tid prn muscle spasms  Indications: MUSCLE SPASM 180 Tab 11    albuterol (PROVENTIL HFA, VENTOLIN HFA, PROAIR HFA) 90 mcg/actuation inhaler Take 2 Puffs by inhalation every four (4) hours as needed for Wheezing. 1 Inhaler 0    HYDROmorphone ER (EXALGO ER) 12 mg tablet Take 1 Tab by mouth daily. Max Daily Amount: 12 mg. Due to fill 9/15/16 For chronic pain 30 Tab 0    oxybutynin (DITROPAN) 5 mg tablet Take 1 Tab by mouth two (2) times a day. 60 Tab 3    HYDROmorphone (EXALGO ER) 8 mg Tb24 Take 8 mg by mouth daily. Max Daily Amount: 8 mg. For chronic pain (due to fill 7/2/16) replaces opana ER 30 Tab 0    OxyMORPhone 20 mg PO ER tablet Take 20 mg by mouth every twelve (12) hours.  OTHER Motorized Wheelchair 1 Device prn    multivitamin (ONE A DAY) tablet Take 1 tablet by mouth daily.  ascorbic acid (VITAMIN C) 250 mg tablet Take  by mouth.  Cholecalciferol, Vitamin D3, 5,000 unit Tab Take  by mouth daily.  cyanocobalamin 2,000 mcg TbER Take  by mouth daily.  lactobacillus acidophilus (ACIDOPHILUS) 500 million cell Tab Take  by mouth daily. Vital signs:    Visit Vitals    BP (!) 130/92 (BP 1 Location: Right arm, BP Patient Position: Sitting)    Pulse 94    Temp 98.4 °F (36.9 °C) (Oral)    Resp 14    Ht 5' 11\" (1.803 m)    Wt 103.5 kg (228 lb 3.2 oz)    LMP 12/15/2016    SpO2 94%    BMI 31.83 kg/m2       Review of Systems: does get some dizziness. Has some pain in the left ear.   As above otherwise 11 point review of systems negative including;   Constitutional no fever or chills  Skin denies rash or itching  HEENT  Denies tinnitus, hearing lose  Eyes denies diplopia vision lose  Respiratory denies sortness of breath  Cardiovascular denies chest pain, dyspnea on exertion  Gastrointestinal denies nausea, vomiting, diarrhea, constipation  Genitourinary denies incontinence  Musculoskeletal denies joint pain or swelling  Endocrine denies weight change  Hematology denies easy bruising or bleeding   Neurological as above in HPI      Patient Active Problem List   Diagnosis Code    Hypertension I10    Diabetes mellitus type II, controlled ([facility] [address] E11.9    Coronary atherosclerosis of native coronary artery(aka CAD) I25.10    Headache R51    GERD (gastroesophageal reflux disease) K21.9    Gout M10.9    Endometriosis N80.9    Abnormal Pap smear [id number]    Contusion T14.8    Post-concussion headache G44.309    Chronic pain G89.29    Back pain M54.9    Multiple contusions T14.8    Fall W19. Dariel Del    Chest pain R07.9    Neck pain M54.2    Disturbance of skin sensation R20.9    Carpal tunnel syndrome G56.00    Bilateral carpal tunnel syndrome G56.03    Cervicalgia M54.2    Brachial neuritis M54.12    Degeneration of cervical intervertebral disc M50.30    Degeneration of lumbar or lumbosacral intervertebral disc M51.37    Encounter for long-term opiate analgesic use Z79.891    Chronic pain syndrome G89.4    Neuritis of lower extremity G57.90         Objective: The patient is awake, alert, and oriented x 3, knows its March 1, not sure of the day of the week.  Speech is dysfluent, slow and memory is intact.  She is psychomotor slow. Cranial Nerves: II - Visual fields are full to confrontation. III, IV, VI - Extraocular movements are intact. There is no nystagmus. V - Facial sensation is intact to pinprick. VII - Face is symmetrical.  VIII - Hearing is present. IX, X, XII - Palate is symmetrical.   XI - Shoulder shrugging and head turning intact  Motor: The patient has generalized weakness of both legs, worse on the right than the left. She's no better than 2+/5 on the right. Tone is normal. Reflexes are 2+ and symmetrical. Plantars are down going. Gait is abnormal, she limps off of the right leg, uses a cane in the right hand.     CBC:   Lab Results Component Value Date/Time    WBC 8.2 07/03/2016 09:59 PM    RBC 5.57 07/03/2016 09:59 PM    HGB 12.7 07/03/2016 09:59 PM    HCT 37.8 07/03/2016 09:59 PM    PLATELET 293 33/47/3167 09:59 PM     BMP:   Lab Results   Component Value Date/Time    Glucose 133 08/17/2016 11:51 AM    Sodium 141 08/17/2016 11:51 AM    Potassium 4.5 08/17/2016 11:51 AM    Chloride 105 08/17/2016 11:51 AM    CO2 26 08/17/2016 11:51 AM    BUN 11 08/17/2016 11:51 AM    Creatinine 0.87 08/17/2016 11:51 AM    Calcium 9.4 08/17/2016 11:51 AM     CMP:   Lab Results   Component Value Date/Time    Glucose 133 08/17/2016 11:51 AM    Sodium 141 08/17/2016 11:51 AM    Potassium 4.5 08/17/2016 11:51 AM    Chloride 105 08/17/2016 11:51 AM    CO2 26 08/17/2016 11:51 AM    BUN 11 08/17/2016 11:51 AM    Creatinine 0.87 08/17/2016 11:51 AM    Calcium 9.4 08/17/2016 11:51 AM    Anion gap 10 08/17/2016 11:51 AM    BUN/Creatinine ratio 13 08/17/2016 11:51 AM    Alk. phosphatase 44 03/14/2016 03:34 PM    Protein, total 6.2 03/14/2016 03:34 PM    Albumin 3.8 03/14/2016 03:34 PM    Globulin 2.4 03/14/2016 03:34 PM    A-G Ratio 1.6 03/14/2016 03:34 PM     Coagulation:   Lab Results   Component Value Date/Time    Prothrombin time 12.6 07/10/2012 12:18 PM    INR 1.0 07/10/2012 12:18 PM    aPTT 29.4 07/10/2012 12:18 PM     Cardiac markers:   Lab Results   Component Value Date/Time    CK 63 07/03/2016 09:59 PM    CK-MB Index 1.6 07/03/2016 09:59 PM       Assessment:  Chronic pain syndrome after trauma, etiology uncertain. Continued weakness of the right leg, no clearcut etiology established. Slow and slurred speech with no other cognitive deficits, unclear etiology. Plan: Will renew her medications I'm providing for headache,which are only marginally effective. I really have very little to offer her at this time in reference to therapy and hope pain management can help her. Will see back here in 6 months. Sincerely,        Jasen Lehman.  Marybel James M.D.

## 2017-03-01 NOTE — LETTER
3/1/2017 4:26 PM 
 
Patient:  Jolly Estrada YOB: 1965 Date of Visit: 3/1/2017 Dear Rubi Flynn, NP 
[address] 
[place] [id number] VIA In Basket 
 : Thank you for referring Ms. Iesha Sánchez to me for evaluation/treatment. Below are the relevant portions of my assessment and plan of care. Re: Marina Miller D Juan Carlos,Follow up visit     3/1/2017 4:20 PM 
 
 
SSN: [id number] Subjective:   Jolly Estrada returns today. She's been treated by the pain clinic for the last year with only marginal improvement. She still has a lot of pain in the back and down the right leg. He has been falling because of weakness of the right leg. The pain clinic is going to be getting a hoverround because of the chronic falling. She complains of terrible headaches. She has daily headaches, the medications do not seem to be working at all. Medications:   
Current Outpatient Prescriptions Medication Sig Dispense Refill  spironolactone (ALDACTONE) 25 mg tablet Take 2 Tabs by mouth daily. 60 Tab 3  
 HYDROmorphone ER (EXALGO ER) 12 mg tablet Take 1 Tab by mouth daily for 30 days. Max Daily Amount: 12 mg. Due to fill 3/30/17 For chronic pain  Indications: Chronic Pain with Opioid Tolerance, SEVERE PAIN WITH OPIOID TOLERANCE 30 Tab 0  
 oxyCODONE IR (ROXICODONE) 15 mg immediate release tablet Take 1 Tab by mouth four (4) times daily for 30 days. Max Daily Amount: 60 mg. Prn severe pain due to fill 3/12/17  Indications: Pain 120 Tab 0  
 nortriptyline (PAMELOR) 50 mg capsule Take 1 Cap by mouth nightly. 30 Cap 5  
 nortriptyline (PAMELOR) 75 mg capsule Take 1 Cap by mouth daily. 60 Cap 5  
 allopurinol (ZYLOPRIM) 100 mg tablet Take 1 Tab by mouth daily. 30 Tab 5  carvedilol (COREG) 25 mg tablet Take 1 Tab by mouth two (2) times a day. 60 Tab 5  lovastatin (MEVACOR) 20 mg tablet Take 1 Tab by mouth nightly.  30 Tab 3  
  cloNIDine HCl (CATAPRES) 0.1 mg tablet TAKE ONE TABLET BY MOUTH TWICE DAILY 60 Tab 3  
 metFORMIN (GLUCOPHAGE) 1,000 mg tablet Take 1 Tab by mouth two (2) times daily (with meals). 60 Tab 3  
 ondansetron hcl (ZOFRAN, AS HYDROCHLORIDE,) 8 mg tablet Take 1 Tab by mouth every eight (8) hours as needed for Nausea. 12 Tab 1  
 glucose blood VI test strips (RELION PRIME TEST STRIPS) strip Use as directed 50 Strip 2  tiZANidine (ZANAFLEX) 2 mg tablet Take one to two tabs po tid prn muscle spasms  Indications: MUSCLE SPASM 180 Tab 11  
 albuterol (PROVENTIL HFA, VENTOLIN HFA, PROAIR HFA) 90 mcg/actuation inhaler Take 2 Puffs by inhalation every four (4) hours as needed for Wheezing. 1 Inhaler 0  
 HYDROmorphone ER (EXALGO ER) 12 mg tablet Take 1 Tab by mouth daily. Max Daily Amount: 12 mg. Due to fill 9/15/16 For chronic pain 30 Tab 0  
 oxybutynin (DITROPAN) 5 mg tablet Take 1 Tab by mouth two (2) times a day. 60 Tab 3  
 HYDROmorphone (EXALGO ER) 8 mg Tb24 Take 8 mg by mouth daily. Max Daily Amount: 8 mg. For chronic pain (due to fill 7/2/16) replaces opana ER 30 Tab 0  
 OxyMORPhone 20 mg PO ER tablet Take 20 mg by mouth every twelve (12) hours.  OTHER Motorized Wheelchair 1 Device prn  multivitamin (ONE A DAY) tablet Take 1 tablet by mouth daily.  ascorbic acid (VITAMIN C) 250 mg tablet Take  by mouth.  Cholecalciferol, Vitamin D3, 5,000 unit Tab Take  by mouth daily.  cyanocobalamin 2,000 mcg TbER Take  by mouth daily.  lactobacillus acidophilus (ACIDOPHILUS) 500 million cell Tab Take  by mouth daily. Vital signs:   
Visit Vitals  BP (!) 130/92 (BP 1 Location: Right arm, BP Patient Position: Sitting)  Pulse 94  Temp 98.4 °F (36.9 °C) (Oral)  Resp 14  
 Ht 5' 11\" (1.803 m)  Wt 103.5 kg (228 lb 3.2 oz)  LMP 12/15/2016  SpO2 94%  BMI 31.83 kg/m2 Review of Systems: does get some dizziness. Has some pain in the left ear. As above otherwise 11 point review of systems negative including;  
Constitutional no fever or chills Skin denies rash or itching HEENT  Denies tinnitus, hearing lose Eyes denies diplopia vision lose Respiratory denies sortness of breath Cardiovascular denies chest pain, dyspnea on exertion Gastrointestinal denies nausea, vomiting, diarrhea, constipation Genitourinary denies incontinence Musculoskeletal denies joint pain or swelling Endocrine denies weight change Hematology denies easy bruising or bleeding Neurological as above in HPI Patient Active Problem List  
Diagnosis Code  Hypertension I10  
 Diabetes mellitus type II, controlled ([facility] [address] E11.9  Coronary atherosclerosis of native coronary artery(aka CAD) I25.10  Headache R51  GERD (gastroesophageal reflux disease) K21.9  Gout M10.9  Endometriosis N80.9  Abnormal Pap smear [id number]  Contusion T14.8  Post-concussion headache G44.309  Chronic pain G89.29  
 Back pain M54.9  Multiple contusions T14.8  Fall W19. Gaudencio Luke  Chest pain R07.9  Neck pain M54.2  Disturbance of skin sensation R20.9  Carpal tunnel syndrome G56.00  Bilateral carpal tunnel syndrome G56.03  
 Cervicalgia M54.2  Brachial neuritis M54.12  Degeneration of cervical intervertebral disc M50.30  Degeneration of lumbar or lumbosacral intervertebral disc M51.37  
 Encounter for long-term opiate analgesic use Z79.891  Chronic pain syndrome G89.4  Neuritis of lower extremity G57.90 Objective: The patient is awake, alert, and oriented x 3, knows its March 1, not sure of the day of the week.  Speech is dysfluent, slow and memory is intact.  She is psychomotor slow. Cranial Nerves: II  Visual fields are full to confrontation. III, IV, VI  Extraocular movements are intact. There is no nystagmus. V  Facial sensation is intact to pinprick.   VII  Face is symmetrical.  VIII - Hearing is present. IX, X, XII  Palate is symmetrical.   XI - Shoulder shrugging and head turning intact Motor: The patient has generalized weakness of both legs, worse on the right than the left. She's no better than 2+/5 on the right. Tone is normal. Reflexes are 2+ and symmetrical. Plantars are down going. Gait is abnormal, she limps off of the right leg, uses a cane in the right hand. CBC:  
Lab Results Component Value Date/Time WBC 8.2 07/03/2016 09:59 PM  
 RBC 5.57 07/03/2016 09:59 PM  
 HGB 12.7 07/03/2016 09:59 PM  
 HCT 37.8 07/03/2016 09:59 PM  
 PLATELET 230 38/21/7162 09:59 PM  
 
BMP:  
Lab Results Component Value Date/Time Glucose 133 08/17/2016 11:51 AM  
 Sodium 141 08/17/2016 11:51 AM  
 Potassium 4.5 08/17/2016 11:51 AM  
 Chloride 105 08/17/2016 11:51 AM  
 CO2 26 08/17/2016 11:51 AM  
 BUN 11 08/17/2016 11:51 AM  
 Creatinine 0.87 08/17/2016 11:51 AM  
 Calcium 9.4 08/17/2016 11:51 AM  
 
CMP:  
Lab Results Component Value Date/Time Glucose 133 08/17/2016 11:51 AM  
 Sodium 141 08/17/2016 11:51 AM  
 Potassium 4.5 08/17/2016 11:51 AM  
 Chloride 105 08/17/2016 11:51 AM  
 CO2 26 08/17/2016 11:51 AM  
 BUN 11 08/17/2016 11:51 AM  
 Creatinine 0.87 08/17/2016 11:51 AM  
 Calcium 9.4 08/17/2016 11:51 AM  
 Anion gap 10 08/17/2016 11:51 AM  
 BUN/Creatinine ratio 13 08/17/2016 11:51 AM  
 Alk. phosphatase 44 03/14/2016 03:34 PM  
 Protein, total 6.2 03/14/2016 03:34 PM  
 Albumin 3.8 03/14/2016 03:34 PM  
 Globulin 2.4 03/14/2016 03:34 PM  
 A-G Ratio 1.6 03/14/2016 03:34 PM  
 
Coagulation:  
Lab Results Component Value Date/Time Prothrombin time 12.6 07/10/2012 12:18 PM  
 INR 1.0 07/10/2012 12:18 PM  
 aPTT 29.4 07/10/2012 12:18 PM  
 
Cardiac markers:  
Lab Results Component Value Date/Time CK 63 07/03/2016 09:59 PM  
 CK-MB Index 1.6 07/03/2016 09:59 PM  
 
 
Assessment:  Chronic pain syndrome after trauma, etiology uncertain. Continued weakness of the right leg, no clearcut etiology established. Slow and slurred speech with no other cognitive deficits, unclear etiology. Plan: Will renew her medications I'm providing for headache,which are only marginally effective. I really have very little to offer her at this time in reference to therapy and hope pain management can help her. Will see back here in 6 months. Sincerely, 
 
 
 
Paulette Mcduffie. Marques El M.D. If you have questions, please do not hesitate to call me. I look forward to following Ms. Rangel along with you.  
 
 
 
Sincerely, 
 
 
Lachelle Mckenna MD

## 2017-03-01 NOTE — COMMUNICATION BODY
Re:  Toyin Rangel,Follow up visit     3/1/2017 4:20 PM      SSN: [id number]    Subjective:   Katelynn Ramirez returns today. She's been treated by the pain clinic for the last year with only marginal improvement. She still has a lot of pain in the back and down the right leg. He has been falling because of weakness of the right leg. The pain clinic is going to be getting a hoverround because of the chronic falling. She complains of terrible headaches. She has daily headaches, the medications do not seem to be working at all. Medications:    Current Outpatient Prescriptions   Medication Sig Dispense Refill    spironolactone (ALDACTONE) 25 mg tablet Take 2 Tabs by mouth daily. 60 Tab 3    HYDROmorphone ER (EXALGO ER) 12 mg tablet Take 1 Tab by mouth daily for 30 days. Max Daily Amount: 12 mg. Due to fill 3/30/17 For chronic pain  Indications: Chronic Pain with Opioid Tolerance, SEVERE PAIN WITH OPIOID TOLERANCE 30 Tab 0    oxyCODONE IR (ROXICODONE) 15 mg immediate release tablet Take 1 Tab by mouth four (4) times daily for 30 days. Max Daily Amount: 60 mg. Prn severe pain due to fill 3/12/17  Indications: Pain 120 Tab 0    nortriptyline (PAMELOR) 50 mg capsule Take 1 Cap by mouth nightly. 30 Cap 5    nortriptyline (PAMELOR) 75 mg capsule Take 1 Cap by mouth daily. 60 Cap 5    allopurinol (ZYLOPRIM) 100 mg tablet Take 1 Tab by mouth daily. 30 Tab 5    carvedilol (COREG) 25 mg tablet Take 1 Tab by mouth two (2) times a day. 60 Tab 5    lovastatin (MEVACOR) 20 mg tablet Take 1 Tab by mouth nightly. 30 Tab 3    cloNIDine HCl (CATAPRES) 0.1 mg tablet TAKE ONE TABLET BY MOUTH TWICE DAILY 60 Tab 3    metFORMIN (GLUCOPHAGE) 1,000 mg tablet Take 1 Tab by mouth two (2) times daily (with meals). 60 Tab 3    ondansetron hcl (ZOFRAN, AS HYDROCHLORIDE,) 8 mg tablet Take 1 Tab by mouth every eight (8) hours as needed for Nausea.  12 Tab 1    glucose blood VI test strips (RELION PRIME TEST STRIPS) strip Use as directed 50 Strip 2    tiZANidine (ZANAFLEX) 2 mg tablet Take one to two tabs po tid prn muscle spasms  Indications: MUSCLE SPASM 180 Tab 11    albuterol (PROVENTIL HFA, VENTOLIN HFA, PROAIR HFA) 90 mcg/actuation inhaler Take 2 Puffs by inhalation every four (4) hours as needed for Wheezing. 1 Inhaler 0    HYDROmorphone ER (EXALGO ER) 12 mg tablet Take 1 Tab by mouth daily. Max Daily Amount: 12 mg. Due to fill 9/15/16 For chronic pain 30 Tab 0    oxybutynin (DITROPAN) 5 mg tablet Take 1 Tab by mouth two (2) times a day. 60 Tab 3    HYDROmorphone (EXALGO ER) 8 mg Tb24 Take 8 mg by mouth daily. Max Daily Amount: 8 mg. For chronic pain (due to fill 7/2/16) replaces opana ER 30 Tab 0    OxyMORPhone 20 mg PO ER tablet Take 20 mg by mouth every twelve (12) hours.  OTHER Motorized Wheelchair 1 Device prn    multivitamin (ONE A DAY) tablet Take 1 tablet by mouth daily.  ascorbic acid (VITAMIN C) 250 mg tablet Take  by mouth.  Cholecalciferol, Vitamin D3, 5,000 unit Tab Take  by mouth daily.  cyanocobalamin 2,000 mcg TbER Take  by mouth daily.  lactobacillus acidophilus (ACIDOPHILUS) 500 million cell Tab Take  by mouth daily. Vital signs:    Visit Vitals    BP (!) 130/92 (BP 1 Location: Right arm, BP Patient Position: Sitting)    Pulse 94    Temp 98.4 °F (36.9 °C) (Oral)    Resp 14    Ht 5' 11\" (1.803 m)    Wt 103.5 kg (228 lb 3.2 oz)    LMP 12/15/2016    SpO2 94%    BMI 31.83 kg/m2       Review of Systems: does get some dizziness. Has some pain in the left ear.   As above otherwise 11 point review of systems negative including;   Constitutional no fever or chills  Skin denies rash or itching  HEENT  Denies tinnitus, hearing lose  Eyes denies diplopia vision lose  Respiratory denies sortness of breath  Cardiovascular denies chest pain, dyspnea on exertion  Gastrointestinal denies nausea, vomiting, diarrhea, constipation  Genitourinary denies incontinence  Musculoskeletal denies joint pain or swelling  Endocrine denies weight change  Hematology denies easy bruising or bleeding   Neurological as above in HPI      Patient Active Problem List   Diagnosis Code    Hypertension I10    Diabetes mellitus type II, controlled ([facility] [address] E11.9    Coronary atherosclerosis of native coronary artery(aka CAD) I25.10    Headache R51    GERD (gastroesophageal reflux disease) K21.9    Gout M10.9    Endometriosis N80.9    Abnormal Pap smear [id number]    Contusion T14.8    Post-concussion headache G44.309    Chronic pain G89.29    Back pain M54.9    Multiple contusions T14.8    Fall W19. Tiffany Skeens    Chest pain R07.9    Neck pain M54.2    Disturbance of skin sensation R20.9    Carpal tunnel syndrome G56.00    Bilateral carpal tunnel syndrome G56.03    Cervicalgia M54.2    Brachial neuritis M54.12    Degeneration of cervical intervertebral disc M50.30    Degeneration of lumbar or lumbosacral intervertebral disc M51.37    Encounter for long-term opiate analgesic use Z79.891    Chronic pain syndrome G89.4    Neuritis of lower extremity G57.90         Objective: The patient is awake, alert, and oriented x 3, knows its March 1, not sure of the day of the week.  Speech is dysfluent, slow and memory is intact.  She is psychomotor slow. Cranial Nerves: II - Visual fields are full to confrontation. III, IV, VI - Extraocular movements are intact. There is no nystagmus. V - Facial sensation is intact to pinprick. VII - Face is symmetrical.  VIII - Hearing is present. IX, X, XII - Palate is symmetrical.   XI - Shoulder shrugging and head turning intact  Motor: The patient has generalized weakness of both legs, worse on the right than the left. She's no better than 2+/5 on the right. Tone is normal. Reflexes are 2+ and symmetrical. Plantars are down going. Gait is abnormal, she limps off of the right leg, uses a cane in the right hand.     CBC:   Lab Results Component Value Date/Time    WBC 8.2 07/03/2016 09:59 PM    RBC 5.57 07/03/2016 09:59 PM    HGB 12.7 07/03/2016 09:59 PM    HCT 37.8 07/03/2016 09:59 PM    PLATELET 518 94/82/8699 09:59 PM     BMP:   Lab Results   Component Value Date/Time    Glucose 133 08/17/2016 11:51 AM    Sodium 141 08/17/2016 11:51 AM    Potassium 4.5 08/17/2016 11:51 AM    Chloride 105 08/17/2016 11:51 AM    CO2 26 08/17/2016 11:51 AM    BUN 11 08/17/2016 11:51 AM    Creatinine 0.87 08/17/2016 11:51 AM    Calcium 9.4 08/17/2016 11:51 AM     CMP:   Lab Results   Component Value Date/Time    Glucose 133 08/17/2016 11:51 AM    Sodium 141 08/17/2016 11:51 AM    Potassium 4.5 08/17/2016 11:51 AM    Chloride 105 08/17/2016 11:51 AM    CO2 26 08/17/2016 11:51 AM    BUN 11 08/17/2016 11:51 AM    Creatinine 0.87 08/17/2016 11:51 AM    Calcium 9.4 08/17/2016 11:51 AM    Anion gap 10 08/17/2016 11:51 AM    BUN/Creatinine ratio 13 08/17/2016 11:51 AM    Alk. phosphatase 44 03/14/2016 03:34 PM    Protein, total 6.2 03/14/2016 03:34 PM    Albumin 3.8 03/14/2016 03:34 PM    Globulin 2.4 03/14/2016 03:34 PM    A-G Ratio 1.6 03/14/2016 03:34 PM     Coagulation:   Lab Results   Component Value Date/Time    Prothrombin time 12.6 07/10/2012 12:18 PM    INR 1.0 07/10/2012 12:18 PM    aPTT 29.4 07/10/2012 12:18 PM     Cardiac markers:   Lab Results   Component Value Date/Time    CK 63 07/03/2016 09:59 PM    CK-MB Index 1.6 07/03/2016 09:59 PM       Assessment:  Chronic pain syndrome after trauma, etiology uncertain. Continued weakness of the right leg, no clearcut etiology established. Slow and slurred speech with no other cognitive deficits, unclear etiology. Plan: Will renew her medications I'm providing for headache,which are only marginally effective. I really have very little to offer her at this time in reference to therapy and hope pain management can help her. Will see back here in 6 months. Sincerely,        Lanny Montero.  Johnathan Villanueva M.D.

## 2017-03-13 ENCOUNTER — PATIENT OUTREACH (OUTPATIENT)
Dept: FAMILY MEDICINE CLINIC | Age: 52
End: 2017-03-13

## 2017-03-13 NOTE — LETTER
3/16/2017 9:13 AM 
 
Ms. Rima Abdul [address] Dear Rima Abdul I am the Nurse Navigator working with the [address] [place] [address]. I have been unable to reach you by phone. I would like to follow up with you about your blood pressure when you have some time. You can reach me directly at [phone]. Thank you for allowing us to participate in your care!  
 
 
 
Sincerely, 
 
 
Bria Rider RN

## 2017-03-16 NOTE — PROGRESS NOTES
Follow up Chronic Condition    Focus: blood pressure    Unable to reach pt by phone. Letter mailed to pt. Patient has my number if questions arise.

## 2017-03-30 ENCOUNTER — OFFICE VISIT (OUTPATIENT)
Dept: PAIN MANAGEMENT | Age: 52
End: 2017-03-30

## 2017-03-30 VITALS — DIASTOLIC BLOOD PRESSURE: 96 MMHG | SYSTOLIC BLOOD PRESSURE: 137 MMHG | HEART RATE: 93 BPM | RESPIRATION RATE: 19 BRPM

## 2017-03-30 DIAGNOSIS — M51.37 DEGENERATION OF LUMBAR OR LUMBOSACRAL INTERVERTEBRAL DISC: ICD-10-CM

## 2017-03-30 DIAGNOSIS — G44.309 POST-TRAUMATIC HEADACHE, UNSPECIFIED: ICD-10-CM

## 2017-03-30 DIAGNOSIS — M50.30 DEGENERATION OF CERVICAL INTERVERTEBRAL DISC: ICD-10-CM

## 2017-03-30 DIAGNOSIS — M48.061 SPINAL STENOSIS, LUMBAR REGION, WITHOUT NEUROGENIC CLAUDICATION: Primary | ICD-10-CM

## 2017-03-30 DIAGNOSIS — M54.12 BRACHIAL NEURITIS OR RADICULITIS NOS: ICD-10-CM

## 2017-03-30 DIAGNOSIS — M47.817 LUMBOSACRAL SPONDYLOSIS WITHOUT MYELOPATHY: ICD-10-CM

## 2017-03-30 RX ORDER — OXYCODONE HYDROCHLORIDE 15 MG/1
15 TABLET ORAL 4 TIMES DAILY
Qty: 120 TAB | Refills: 0 | Status: SHIPPED | OUTPATIENT
Start: 2017-03-30 | End: 2017-06-08 | Stop reason: SDUPTHER

## 2017-03-30 RX ORDER — NALOXONE HYDROCHLORIDE 4 MG/.1ML
4 SPRAY NASAL AS NEEDED
Qty: 1 BOX | Refills: 1 | Status: SHIPPED | OUTPATIENT
Start: 2017-03-30 | End: 2017-06-19

## 2017-03-30 RX ORDER — OXYCODONE HYDROCHLORIDE 15 MG/1
15 TABLET ORAL 4 TIMES DAILY
Qty: 120 TAB | Refills: 0 | Status: SHIPPED | OUTPATIENT
Start: 2017-03-30 | End: 2017-03-30 | Stop reason: SDUPTHER

## 2017-03-30 RX ORDER — HYDROMORPHONE HYDROCHLORIDE 12 MG/1
12 TABLET, EXTENDED RELEASE ORAL DAILY
Qty: 30 TAB | Refills: 0 | Status: SHIPPED | OUTPATIENT
Start: 2017-03-30 | End: 2018-05-01

## 2017-03-30 RX ORDER — HYDROMORPHONE HYDROCHLORIDE 12 MG/1
12 TABLET, EXTENDED RELEASE ORAL DAILY
Qty: 30 TAB | Refills: 0 | Status: SHIPPED | OUTPATIENT
Start: 2017-03-30 | End: 2017-03-30 | Stop reason: SDUPTHER

## 2017-03-30 NOTE — MR AVS SNAPSHOT
Visit Information Date & Time Provider Department Dept. Phone Encounter #  
 3/30/2017  4:20 PM Marily Leventhal, [facility] [address] for Pain Management [phone] Follow-up Instructions Return in about 2 months (around 5/30/2017). Your Appointments 9/5/2017  4:00 PM  
Follow Up with Yoanna Limon MD  
[address]) Appt Note: 6mon f/u  
 [address] [id number] [phone]  
  
   
 [facility] [id number] Upcoming Health Maintenance Date Due  
 EYE EXAM RETINAL OR DILATED Q1 9/13/1977 Pneumococcal 19-64 Medium Risk (1 of 1 - PPSV23) 9/13/1986 FOOT EXAM Q1 6/30/2016 INFLUENZA AGE 9 TO ADULT 8/1/2016 HEMOGLOBIN A1C Q6M 8/9/2016 MICROALBUMIN Q1 8/31/2016 PAP AKA CERVICAL CYTOLOGY 11/20/2016 LIPID PANEL Q1 3/14/2017 DTaP/Tdap/Td series (2 - Td) 8/31/2025 Allergies as of 3/30/2017  Review Complete On: 3/30/2017 By: Marily Leventhal, PA Severity Noted Reaction Type Reactions Latex  08/26/2014    Rash Lisinopril  06/07/2012   Intolerance Cough Ace Inhibitor - cough Current Immunizations  Reviewed on 8/31/2015 Name Date Influenza Vaccine 9/25/2015, 10/30/2013 Tdap 8/31/2015, 5/22/2014  6:52 PM  
  
 Not reviewed this visit You Were Diagnosed With   
  
 Codes Comments Spinal stenosis, lumbar region, without neurogenic claudication    -  Primary ICD-10-CM: M48.06 
ICD-9-CM: 724.02 Brachial neuritis or radiculitis NOS     ICD-10-CM: M54.12 
ICD-9-CM: 723.4 Degeneration of lumbar or lumbosacral intervertebral disc     ICD-10-CM: M51.37 
ICD-9-CM: 722.52 Degeneration of cervical intervertebral disc     ICD-10-CM: M50.30 ICD-9-CM: 722.4 Lumbosacral spondylosis without myelopathy     ICD-10-CM: L43.415 ICD-9-CM: 721.3 Post-traumatic headache, unspecified     ICD-10-CM: L99.058 ICD-9-CM: 339.20 Vitals BP Pulse Resp OB Status Smoking Status (!) 137/96 80 19 Menopause Former Smoker Vitals History Preferred Pharmacy Pharmacy Name Phone [facility] [place], [address] [place] Your Updated Medication List  
  
   
This list is accurate as of: 3/30/17  4:59 PM.  Always use your most recent med list.  
  
  
  
  
 ACIDOPHILUS 500 million cell Tab Generic drug:  Lactobacillus acidophilus Take  by mouth daily. albuterol 90 mcg/actuation inhaler Commonly known as:  PROVENTIL HFA, VENTOLIN HFA, PROAIR HFA Take 2 Puffs by inhalation every four (4) hours as needed for Wheezing. allopurinol 100 mg tablet Commonly known as:  Lizet Estrada Take 1 Tab by mouth daily. carvedilol 25 mg tablet Commonly known as:  Alondra Zamudio Take 1 Tab by mouth two (2) times a day. cholecalciferol (VITAMIN D3) 5,000 unit Tab tablet Commonly known as:  VITAMIN D3 Take  by mouth daily. cloNIDine HCl 0.1 mg tablet Commonly known as:  CATAPRES  
TAKE ONE TABLET BY MOUTH TWICE DAILY  
  
 cyanocobalamin 2,000 mcg Tber Take  by mouth daily. glucose blood VI test strips strip Commonly known as:  RELION PRIME TEST STRIPS Use as directed * HYDROmorphone 8 mg Tb24 Commonly known asJaycee Ramirez ER Take 8 mg by mouth daily. Max Daily Amount: 8 mg. For chronic pain (due to fill 7/2/16) replaces opana ER  
  
 * HYDROmorphone ER 12 mg tablet Commonly known asJaycee Ramirez ER Take 1 Tab by mouth daily. Max Daily Amount: 12 mg. Due to fill 4/28/17 For chronic pain  
  
 lovastatin 20 mg tablet Commonly known as:  MEVACOR Take 1 Tab by mouth nightly. metFORMIN 1,000 mg tablet Commonly known as:  GLUCOPHAGE Take 1 Tab by mouth two (2) times daily (with meals). multivitamin tablet Commonly known as:  ONE A DAY  
 Take 1 tablet by mouth daily. naloxone 4 mg/actuation Spry 4 mg by Nasal route as needed for up to 2 doses. Indications: OPIOID TOXICITY  
  
 * nortriptyline 50 mg capsule Commonly known as:  PAMELOR Take 1 Cap by mouth nightly. * nortriptyline 75 mg capsule Commonly known as:  PAMELOR Take 1 Cap by mouth daily. ondansetron hcl 8 mg tablet Commonly known as:  ZOFRAN (AS HYDROCHLORIDE) Take 1 Tab by mouth every eight (8) hours as needed for Nausea. OTHER Motorized Wheelchair  
  
 oxybutynin 5 mg tablet Commonly known as:  ZDMWXAAQ Take 1 Tab by mouth two (2) times a day. oxyCODONE IR 15 mg immediate release tablet Commonly known as:  Terisa Bernard Take 1 Tab by mouth four (4) times daily for 30 days. Max Daily Amount: 60 mg. Prn severe pain due to fill 5/9/17  Indications: Pain  
  
 oxyMORphone 20 mg ER tablet Commonly known as:  OPANA ER Take 20 mg by mouth every twelve (12) hours. spironolactone 25 mg tablet Commonly known as:  ALDACTONE Take 2 Tabs by mouth daily. tiZANidine 2 mg tablet Commonly known as:  Brad Levans Take one to two tabs po tid prn muscle spasms  Indications: MUSCLE SPASM  
  
 VITAMIN C 250 mg tablet Generic drug:  ascorbic acid (vitamin C) Take  by mouth. * Notice: This list has 4 medication(s) that are the same as other medications prescribed for you. Read the directions carefully, and ask your doctor or other care provider to review them with you. Prescriptions Printed Refills HYDROmorphone ER (EXALGO ER) 12 mg tablet 0 Sig: Take 1 Tab by mouth daily. Max Daily Amount: 12 mg. Due to fill 4/28/17 For chronic pain  
 Class: Print Route: Oral  
 oxyCODONE IR (ROXICODONE) 15 mg immediate release tablet 0 Sig: Take 1 Tab by mouth four (4) times daily for 30 days. Max Daily Amount: 60 mg. Prn severe pain due to fill 5/9/17  Indications: Pain Class: Print  Route: Oral  
  
 Prescriptions Sent to Pharmacy Refills  
 naloxone 4 mg/actuation spry 1 Si mg by Nasal route as needed for up to 2 doses. Indications: OPIOID TOXICITY Class: Normal  
 Pharmacy: [facility], [address] [place] [address] #: [phone] Route: Nasal  
  
Follow-up Instructions Return in about 2 months (around 2017). To-Do List   
 2017 4:45 PM  
  Appointment with MARIBEL JOHNSON RM 1 at [address] [place] [address] ([phone]) OUTSIDE FILMS  - Any outside films related to the study being scheduled should be brought with you on the day of the exam.  If this cannot be done there may be a delay in the reading of the study. MEDICATIONS  - Patient must bring a complete list of all medications currently taking to include prescriptions, over-the-counter meds, herbals, vitamins & any dietary supplements  GENERAL INSTRUCTIONS  - On the day of your exam do not use any bath powder, deodorant or lotions on the armpit area. -Tenderness of breasts may cause an increase of discomfort during procedure. If you are experiencing breast tenderness on the day of your appointment and would like to reschedule, please call [phone]. Patient Instructions 1. Continue medications as prescribed 2. Follow up in two months 3. Naloxone nasal spray issued, info sheet given/ask further questions to pharmacist 
 
  
[facility]! Pati Layton introduces [employer] patient portal. Now you can access parts of your medical record, email your doctor's office, and request medication refills online. 1. In your internet browser, go to [email or web address] 2. Click on the First Time User? Click Here link in the Sign In box. You will see the New Member Sign Up page. 3. Enter your [employer] Access Code exactly as it appears below. You will not need to use this code after youve completed the sign-up process.  If you do not sign up before the expiration date, you must request a new code. · [employer] Access Code: [email or web address] Expires: 6/28/2017  4:59 PM 
 
4. Enter the last four digits of your Social Security Number (xxxx) and Date of Birth (mm/dd/yyyy) as indicated and click Submit. You will be taken to the next sign-up page. 5. Create a [employer] ID. This will be your [employer] login ID and cannot be changed, so think of one that is secure and easy to remember. 6. Create a [employer] password. You can change your password at any time. 7. Enter your Password Reset Question and Answer. This can be used at a later time if you forget your password. 8. Enter your e-mail address. You will receive e-mail notification when new information is available in [address]. 9. Click Sign Up. You can now view and download portions of your medical record. 10. Click the Download Summary menu link to download a portable copy of your medical information. If you have questions, please visit the Frequently Asked Questions section of the [employer] website. Remember, [employer] is NOT to be used for urgent needs. For medical emergencies, dial 911. Now available from your iPhone and Android! Please provide this summary of care documentation to your next provider. Your primary care clinician is listed as [address]. If you have any questions after today's visit, please call [phone].
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
## 2017-03-30 NOTE — PATIENT INSTRUCTIONS
1.  Continue medications as prescribed  2. Follow up in two months  3.   Naloxone nasal spray issued, info sheet given/ask further questions to pharmacist

## 2017-03-31 ENCOUNTER — HOSPITAL ENCOUNTER (OUTPATIENT)
Dept: MAMMOGRAPHY | Age: 52
Discharge: HOME | End: 2017-03-31
Attending: NURSE PRACTITIONER
Payer: SELF-PAY

## 2017-03-31 DIAGNOSIS — Z12.39 BREAST CANCER SCREENING: ICD-10-CM

## 2017-03-31 PROCEDURE — 77067 SCR MAMMO BI INCL CAD: CPT

## 2017-04-01 RX ORDER — ONDANSETRON HYDROCHLORIDE 8 MG/1
8 TABLET, FILM COATED ORAL
Qty: 12 TAB | Refills: 1 | Status: SHIPPED | OUTPATIENT
Start: 2017-04-01 | End: 2018-08-10

## 2017-04-18 ENCOUNTER — PATIENT OUTREACH (OUTPATIENT)
Dept: FAMILY MEDICINE CLINIC | Age: 52
End: 2017-04-18

## 2017-04-18 DIAGNOSIS — M10.9 GOUT, UNSPECIFIED CAUSE, UNSPECIFIED CHRONICITY, UNSPECIFIED SITE: ICD-10-CM

## 2017-04-18 NOTE — PROGRESS NOTES
Follow up Chronic Condition    Focus: DM, HTN, Asthma    Pt reports she is currently dealing with a lot of family stress. She has not been able to check her BG's as she is out of test strips and lancets. Pt reports she is taking all her DM medications. Pt has not scheduled an appt for WWE with GARRYL. Asks that I text her the number to schedule. Pt requests refills of allopurinol and ditropan. Reports currently having a gout flare in her ankle. Notified pt she should still have a refill of the allopurinol at her pharmacy. Will route request for ditropan to provider. Pt is currently out of her inhalers. Requests I text her Rinku Gomez number to reorder. Pt currently cannot talk; requests I call her back tomorrow. Numbers for DENIS and Isa Wu texted to [phone]. Follow up appt with PCP scheduled for 5/3. Pt did not return my call. Will follow up with pt in 2 weeks. Patient has my number if questions arise.

## 2017-04-19 RX ORDER — OXYBUTYNIN CHLORIDE 5 MG/1
5 TABLET ORAL 2 TIMES DAILY
Qty: 60 TAB | Refills: 3 | Status: SHIPPED | OUTPATIENT
Start: 2017-04-19

## 2017-05-09 ENCOUNTER — PATIENT OUTREACH (OUTPATIENT)
Dept: FAMILY MEDICINE CLINIC | Age: 52
End: 2017-05-09

## 2017-05-09 NOTE — PROGRESS NOTES
Follow up Chronic Condition    Focus: Diabetes, asthma & gout    Patient missed her scheduled appt on 5/3. Pt states she forgot. Pt is due for an A1C; last one run was 2/9/16. Pt reports her BG's are usually between 130-140. Pt reports she refilled her allopurinol and her gout flare has subsided for the most part. States she has no more pain but her joints still have some swelling. Pt did not order her inhalers or call [facility] to get a gyn appt. Gave pt numbers for pharmacy assistance  and [facility] again. Patient has my number if questions arise.

## 2017-05-17 ENCOUNTER — HOSPITAL ENCOUNTER (OUTPATIENT)
Dept: LAB | Age: 52
Discharge: HOME | End: 2017-05-17

## 2017-05-17 ENCOUNTER — OFFICE VISIT (OUTPATIENT)
Dept: FAMILY MEDICINE CLINIC | Age: 52
End: 2017-05-17

## 2017-05-17 VITALS
TEMPERATURE: 97.8 F | HEART RATE: 80 BPM | HEIGHT: 71 IN | OXYGEN SATURATION: 98 % | WEIGHT: 226 LBS | SYSTOLIC BLOOD PRESSURE: 117 MMHG | DIASTOLIC BLOOD PRESSURE: 85 MMHG | RESPIRATION RATE: 16 BRPM | BODY MASS INDEX: 31.64 KG/M2

## 2017-05-17 DIAGNOSIS — E11.69 CONTROLLED TYPE 2 DIABETES MELLITUS WITH OTHER SPECIFIED COMPLICATION, WITHOUT LONG-TERM CURRENT USE OF INSULIN (HCC): ICD-10-CM

## 2017-05-17 DIAGNOSIS — N92.1 METRORRHAGIA: ICD-10-CM

## 2017-05-17 DIAGNOSIS — Z00.00 HEALTH CARE MAINTENANCE: Primary | ICD-10-CM

## 2017-05-17 DIAGNOSIS — E04.9 ENLARGED THYROID: ICD-10-CM

## 2017-05-17 DIAGNOSIS — Z87.42 HISTORY OF ENDOMETRIOSIS: ICD-10-CM

## 2017-05-17 LAB
ALBUMIN SERPL BCP-MCNC: 4.1 G/DL (ref 3.4–5)
ALBUMIN/GLOB SERPL: 1.5 {RATIO} (ref 0.8–1.7)
ALP SERPL-CCNC: 48 U/L (ref 45–117)
ALT SERPL-CCNC: 27 U/L (ref 13–56)
ANION GAP BLD CALC-SCNC: 8 MMOL/L (ref 3–18)
AST SERPL W P-5'-P-CCNC: 18 U/L (ref 15–37)
BILIRUB SERPL-MCNC: 0.3 MG/DL (ref 0.2–1)
BUN SERPL-MCNC: 9 MG/DL (ref 7–18)
BUN/CREAT SERPL: 11 (ref 12–20)
CALCIUM SERPL-MCNC: 9.3 MG/DL (ref 8.5–10.1)
CHLORIDE SERPL-SCNC: 105 MMOL/L (ref 100–108)
CHOLEST SERPL-MCNC: 165 MG/DL
CO2 SERPL-SCNC: 26 MMOL/L (ref 21–32)
CREAT SERPL-MCNC: 0.8 MG/DL (ref 0.6–1.3)
EST. AVERAGE GLUCOSE BLD GHB EST-MCNC: 123 MG/DL
GLOBULIN SER CALC-MCNC: 2.8 G/DL (ref 2–4)
GLUCOSE POC: 76 MG/DL
GLUCOSE SERPL-MCNC: 85 MG/DL (ref 74–99)
HBA1C MFR BLD: 5.9 % (ref 4.2–5.6)
HDLC SERPL-MCNC: 50 MG/DL (ref 40–60)
HDLC SERPL: 3.3 {RATIO} (ref 0–5)
LDLC SERPL CALC-MCNC: 82.6 MG/DL (ref 0–100)
LIPID PROFILE,FLP: ABNORMAL
POTASSIUM SERPL-SCNC: 4.9 MMOL/L (ref 3.5–5.5)
PROT SERPL-MCNC: 6.9 G/DL (ref 6.4–8.2)
SODIUM SERPL-SCNC: 139 MMOL/L (ref 136–145)
TRIGL SERPL-MCNC: 162 MG/DL (ref ?–150)
TSH SERPL DL<=0.05 MIU/L-ACNC: 0.64 UIU/ML (ref 0.36–3.74)
VLDLC SERPL CALC-MCNC: 32.4 MG/DL

## 2017-05-17 PROCEDURE — 80061 LIPID PANEL: CPT | Performed by: NURSE PRACTITIONER

## 2017-05-17 PROCEDURE — 80053 COMPREHEN METABOLIC PANEL: CPT | Performed by: NURSE PRACTITIONER

## 2017-05-17 PROCEDURE — 84443 ASSAY THYROID STIM HORMONE: CPT | Performed by: NURSE PRACTITIONER

## 2017-05-17 PROCEDURE — 83036 HEMOGLOBIN GLYCOSYLATED A1C: CPT | Performed by: NURSE PRACTITIONER

## 2017-05-17 NOTE — PROGRESS NOTES
No chief complaint on file. 1. Have you been to the ER, urgent care clinic since your last visit? Hospitalized since your last visit? No    2. Have you seen or consulted any other health care providers outside of the [facility] since your last visit? Yes When: 5/17/17 - [place]  3. When was your last Pap smear? 5/17/17  When was your last Mammogram? 2017  When was your last Colon screening? 2000  PMH/FH/Social Hx reviewed and updated as needed      Applicable screenings reviewed and updated as needed  Medication reconciliation performed. Patient does not know need medication refills. Health Maintenance reviewed.     .sent

## 2017-05-17 NOTE — PROGRESS NOTES
HPI  Kiera Alfonso is a 54 y.o. female being seen today for follow up for diabetes. Trying to eat healthfully and drink a lot of water. .  she states that she had a PAP at [facility] and still spotting from it earlier today. Concerned because PAP was very uncomfortable. Past Medical History:   Diagnosis Date    Abnormal nuclear cardiac imaging test 07/10/2012    Mod mid to distal anterior septal ischemia. EF 47%. Mid to distal anterior septal hypk. Normal EKG portion of stress test.  Intermediate high risk.  Asthma     Back pain     injury at work June 2014    Coronary atherosclerosis of native coronary artery     angiographically normal coronaries with dLAD bridging (july 2012)    Diabetes mellitus type II, controlled ([facility] [address]     Forgetfulness     since injury June 2014    Fracture of left humerus     GERD (gastroesophageal reflux disease)     Gout     Headache     Hypertension     Lack of coordination     since injury June 2014    Neck pain     injury at work June 2014    Obesity     S/P cardiac cath 07/10/2012    dLAD w/mild to mod bridging. Otherwise patent coronaries. At least mod LVH. EF 65%. ROS  Patient states that she is feeling well. Denies complaints of chest pain, shortness of breath, swelling of legs, dizziness or weakness. she denies nausea, vomiting or diarrhea. Current Outpatient Prescriptions   Medication Sig    oxybutynin (DITROPAN) 5 mg tablet Take 1 Tab by mouth two (2) times a day.  ondansetron hcl (ZOFRAN, AS HYDROCHLORIDE,) 8 mg tablet Take 1 Tab by mouth every eight (8) hours as needed for Nausea.  HYDROmorphone ER (EXALGO ER) 12 mg tablet Take 1 Tab by mouth daily. Max Daily Amount: 12 mg. Due to fill 4/28/17 For chronic pain    spironolactone (ALDACTONE) 25 mg tablet Take 2 Tabs by mouth daily.  nortriptyline (PAMELOR) 50 mg capsule Take 1 Cap by mouth nightly.  nortriptyline (PAMELOR) 75 mg capsule Take 1 Cap by mouth daily.     allopurinol (ZYLOPRIM) 100 mg tablet Take 1 Tab by mouth daily.  carvedilol (COREG) 25 mg tablet Take 1 Tab by mouth two (2) times a day.  lovastatin (MEVACOR) 20 mg tablet Take 1 Tab by mouth nightly.  cloNIDine HCl (CATAPRES) 0.1 mg tablet TAKE ONE TABLET BY MOUTH TWICE DAILY    metFORMIN (GLUCOPHAGE) 1,000 mg tablet Take 1 Tab by mouth two (2) times daily (with meals).  glucose blood VI test strips (RELION PRIME TEST STRIPS) strip Use as directed    tiZANidine (ZANAFLEX) 2 mg tablet Take one to two tabs po tid prn muscle spasms  Indications: MUSCLE SPASM    albuterol (PROVENTIL HFA, VENTOLIN HFA, PROAIR HFA) 90 mcg/actuation inhaler Take 2 Puffs by inhalation every four (4) hours as needed for Wheezing.  HYDROmorphone (EXALGO ER) 8 mg Tb24 Take 8 mg by mouth daily. Max Daily Amount: 8 mg. For chronic pain (due to fill 7/2/16) replaces opana ER    OxyMORPhone 20 mg PO ER tablet Take 20 mg by mouth every twelve (12) hours.  multivitamin (ONE A DAY) tablet Take 1 tablet by mouth daily.  ascorbic acid (VITAMIN C) 250 mg tablet Take  by mouth.  Cholecalciferol, Vitamin D3, 5,000 unit Tab Take  by mouth daily.  cyanocobalamin 2,000 mcg TbER Take  by mouth daily.  lactobacillus acidophilus (ACIDOPHILUS) 500 million cell Tab Take  by mouth daily.  naloxone 4 mg/actuation spry 4 mg by Nasal route as needed for up to 2 doses. Indications: OPIOID TOXICITY    OTHER Motorized Wheelchair     No current facility-administered medications for this visit. PE  Visit Vitals    /85 (BP 1 Location: Left arm, BP Patient Position: Sitting)    Pulse 80    Temp 97.8 °F (36.6 °C) (Oral)    Resp 16    Ht 5' 11\" (1.803 m)    Wt 226 lb (102.5 kg)    LMP 05/04/2017 (Exact Date)  Comment: heavy /painful cycles    SpO2 98%    BMI 31.52 kg/m2        Alert and oriented with normal mood and affect. she is well developed and well nourished . Lungs are clear without wheezing.  Heart rate is regular without murmurs or gallops. There is no lower extremity edema. On visual exam thyroid appears enlarged but upon palpation thyroid felt borderline enlarged. Results for orders placed or performed in visit on 05/17/17   AMB POC GLUCOSE BLOOD, BY GLUCOSE MONITORING DEVICE   Result Value Ref Range    Glucose POC 76 mg/dL         Assessment and Plan:        ICD-10-CM ICD-9-CM    1. Health care maintenance Z00.00 V70.0 AMB POC GLUCOSE BLOOD, BY GLUCOSE MONITORING DEVICE   2. Controlled type 2 diabetes mellitus with other specified complication, without long-term current use of insulin ([facility]) V58.36 953.11 METABOLIC PANEL, COMPREHENSIVE      TSH 3RD GENERATION      LIPID PANEL      HEMOGLOBIN A1C WITH EAG   3. Metrorrhagia N92.1 626.6    4.  History of endometriosis Z87.42 V13.29      Thyroid ultrasound to evaluate hx of enlarged thyroid with normal TSH  Refer to GYN to further investigate need for biopsy and or D+C - hx of heavy menses, hx of endometriosis and told she needed biopsy for [facility] gyn      Roberto Joseph NP

## 2017-05-17 NOTE — MR AVS SNAPSHOT
Visit Information Date & Time Provider Department Dept. Phone Encounter #  
 5/17/2017 12:00 PM Kirt Crespo, [facility] [id number] Your Appointments 5/19/2017 11:00 AM  
New Patient with Marcus Estrada MD  
[facility] ([address]) Appt Note: NP-menstrual menagerie, endometriosis referred from the [address] [address]  
[phone]  
  
   
 [address], [address] Angelita Austin 5/26/2017  4:20 PM  
Follow Up with KRAIG [facility] (TUCKER SCHEDULING) Appt Note: Return in about 2 months (around 5/30/2017). [address] [facility] [address]  
[phone] [address]  
  
    
 9/5/2017  4:00 PM  
Follow Up with Priti Winters MD  
[facility] [address]) Appt Note: 6mon f/u  
 [address] [address] [phone]  
  
   
 [facility] [address] Upcoming Health Maintenance Date Due  
 EYE EXAM RETINAL OR DILATED Q1 9/13/1977 Pneumococcal 19-64 Medium Risk (1 of 1 - PPSV23) 9/13/1986 FOOT EXAM Q1 6/30/2016 HEMOGLOBIN A1C Q6M 8/9/2016 MICROALBUMIN Q1 8/31/2016 LIPID PANEL Q1 3/14/2017 INFLUENZA AGE 9 TO ADULT 8/1/2017 PAP AKA CERVICAL CYTOLOGY 5/17/2020 DTaP/Tdap/Td series (2 - Td) 8/31/2025 Allergies as of 5/17/2017  Review Complete On: 5/17/2017 By: Mee Steinberg Severity Noted Reaction Type Reactions Latex  08/26/2014    Rash Lisinopril  06/07/2012   Intolerance Cough Ace Inhibitor - cough Current Immunizations  Reviewed on 8/31/2015 Name Date Influenza Vaccine 9/25/2015, 10/30/2013 Tdap 8/31/2015, 5/22/2014  6:52 PM  
  
 Not reviewed this visit You Were Diagnosed With   
  
 Codes Comments Health care maintenance    -  Primary ICD-10-CM: Z00.00 ICD-9-CM: V70.0 Controlled type 2 diabetes mellitus with other specified complication, without long-term current use of insulin (HCC)     ICD-10-CM: E11.69 ICD-9-CM: 250.80 Metrorrhagia     ICD-10-CM: N92.1 ICD-9-CM: 626.6 History of endometriosis     ICD-10-CM: Z87.42 
ICD-9-CM: V13.29 Enlarged thyroid     ICD-10-CM: E01.0 ICD-9-CM: 240.9 Vitals BP Pulse Temp Resp Height(growth percentile) Weight(growth percentile) 117/85 (BP 1 Location: Left arm, BP Patient Position: Sitting) 80 97.8 °F (36.6 °C) (Oral) 16 5' 11\" (1.803 m) 226 lb (102.5 kg) LMP SpO2 BMI OB Status Smoking Status 05/04/2017 (Exact Date) 98% 31.52 kg/m2 Having regular periods Former Smoker Vitals History BMI and BSA Data Body Mass Index Body Surface Area  
 31.52 kg/m 2 2.27 m 2 Preferred Pharmacy Pharmacy Name Phone [facility] - Ros[facility] [address] Your Updated Medication List  
  
   
This list is accurate as of: 5/17/17  1:26 PM.  Always use your most recent med list.  
  
  
  
  
 ACIDOPHILUS 500 million cell Tab Generic drug:  Lactobacillus acidophilus Take  by mouth daily. albuterol 90 mcg/actuation inhaler Commonly known as:  PROVENTIL HFA, VENTOLIN HFA, PROAIR HFA Take 2 Puffs by inhalation every four (4) hours as needed for Wheezing. allopurinol 100 mg tablet Commonly known as:  Joellyn Crow Take 1 Tab by mouth daily. carvedilol 25 mg tablet Commonly known as:  Aminata Stable Take 1 Tab by mouth two (2) times a day. cholecalciferol (VITAMIN D3) 5,000 unit Tab tablet Commonly known as:  VITAMIN D3 Take  by mouth daily. cloNIDine HCl 0.1 mg tablet Commonly known as:  CATAPRES  
TAKE ONE TABLET BY MOUTH TWICE DAILY  
  
 cyanocobalamin 2,000 mcg Tber Take  by mouth daily. glucose blood VI test strips strip Commonly known as:  RELION PRIME TEST STRIPS Use as directed * HYDROmorphone 8 mg Tb24 Commonly known asErnesto Sick ER Take 8 mg by mouth daily. Max Daily Amount: 8 mg. For chronic pain (due to fill 7/2/16) replaces opana ER  
  
 * HYDROmorphone ER 12 mg tablet Commonly known asErnesto Sick ER Take 1 Tab by mouth daily. Max Daily Amount: 12 mg. Due to fill 4/28/17 For chronic pain  
  
 lovastatin 20 mg tablet Commonly known as:  MEVACOR Take 1 Tab by mouth nightly. metFORMIN 1,000 mg tablet Commonly known as:  GLUCOPHAGE Take 1 Tab by mouth two (2) times daily (with meals). multivitamin tablet Commonly known as:  ONE A DAY Take 1 tablet by mouth daily. naloxone 4 mg/actuation Spry 4 mg by Nasal route as needed for up to 2 doses. Indications: OPIOID TOXICITY  
  
 * nortriptyline 50 mg capsule Commonly known as:  PAMELOR Take 1 Cap by mouth nightly. * nortriptyline 75 mg capsule Commonly known as:  PAMELOR Take 1 Cap by mouth daily. ondansetron hcl 8 mg tablet Commonly known as:  ZOFRAN (AS HYDROCHLORIDE) Take 1 Tab by mouth every eight (8) hours as needed for Nausea. OTHER Motorized Wheelchair  
  
 oxybutynin 5 mg tablet Commonly known as:  ZVSGXZQS Take 1 Tab by mouth two (2) times a day. oxyMORphone 20 mg ER tablet Commonly known as:  OPANA ER Take 20 mg by mouth every twelve (12) hours. spironolactone 25 mg tablet Commonly known as:  ALDACTONE Take 2 Tabs by mouth daily. tiZANidine 2 mg tablet Commonly known as:  Laretta Bolk Take one to two tabs po tid prn muscle spasms  Indications: MUSCLE SPASM  
  
 VITAMIN C 250 mg tablet Generic drug:  ascorbic acid (vitamin C) Take  by mouth. * Notice: This list has 4 medication(s) that are the same as other medications prescribed for you. Read the directions carefully, and ask your doctor or other care provider to review them with you. We Performed the Following AMB POC GLUCOSE BLOOD, BY GLUCOSE MONITORING DEVICE [95685 CPT(R)]  DIABETES FOOT EXAM [HM7 Custom] To-Do List   
 05/17/2017 Imaging:  US THYROID/PARATHYROID/SOFT TISS   
  
 05/23/2017 10:30 AM  
  Appointment with [address] at [address] [place] [address] ([phone]) OUTSIDE FILMS  - Any outside films related to the study being scheduled should be brought with you on the day of the exam.  If this cannot be done there may be a delay in the reading of the study. MEDICATIONS  - Patient must bring a complete list of all medications currently taking to include prescriptions, over-the-counter meds, herbals, vitamins & any dietary supplements Patient Instructions You may qualify for a free eye exam through the [employer] club. Call [phone] to register. Call [employer] at [phone]. They will screen you for any insurance you might qualify for. This is the first step before you can receive discounts at the hospital or [facility]. Introducing [facility]! Lawson Mccormick introduces [employer] patient portal. Now you can access parts of your medical record, email your doctor's office, and request medication refills online. 1. In your internet browser, go to [email or web address]. [email or web address] 2. Click on the First Time User? Click Here link in the Sign In box. You will see the New Member Sign Up page. 3. Enter your Album[employer] Access Code exactly as it appears below. You will not need to use this code after youve completed the sign-up process. If you do not sign up before the expiration date, you must request a new code. · Album[employer] Access Code: [email or web address] Expires: 6/28/2017  4:59 PM 
 
4. Enter the last four digits of your Social Security Number (xxxx) and Date of Birth (mm/dd/yyyy) as indicated and click Submit. You will be taken to the next sign-up page. 5. Create a [employer]k ID. This will be your [employer] login ID and cannot be changed, so think of one that is secure and easy to remember. 6. Create a [employer] password. You can change your password at any time. 7. Enter your Password Reset Question and Answer. This can be used at a later time if you forget your password. 8. Enter your e-mail address. You will receive e-mail notification when new information is available in [address] 9. Click Sign Up. You can now view and download portions of your medical record. 10. Click the Download Summary menu link to download a portable copy of your medical information. If you have questions, please visit the Frequently Asked Questions section of the [employer] website. Remember, [employer] is NOT to be used for urgent needs. For medical emergencies, dial 911. Now available from your iPhone and Android! Please provide this summary of care documentation to your next provider. Your primary care clinician is listed as [address]. If you have any questions after today's visit, please call [phone].

## 2017-05-17 NOTE — PROGRESS NOTES
Discharge instructions reviewed with patient  US Thyroid scheduled for Tuesday May 23rd at 1030 am  She informs that [facility] suggested D&C during her Pap Smear. She has a history of Metromenorrhagia and endometriosis  Relion Prime strips given x 1 box of 50  Relion lancets #2 boxes given  Gyn appointment scheduled at [facility] - Friday May 19th w Dr Nasra Acevedo  Return to clinic in 3 months  Medication list and understanding of medications reviewed with patient. OTC and herbal medications reviewed and added to med list if applicable  Barriers to adherence assessed. Guidance given regarding new medications this visit, including reason for taking this medicine, and common side effects.

## 2017-05-17 NOTE — PATIENT INSTRUCTIONS
You may qualify for a free eye exam through the [employer]. Call [phone] to register. Call [employer] at [phone]. They will screen you for any insurance you might qualify for. This is the first step before you can receive discounts at the [facility] or [facility] specialists.

## 2017-05-21 DIAGNOSIS — E11.69 CONTROLLED TYPE 2 DIABETES MELLITUS WITH OTHER SPECIFIED COMPLICATION, WITHOUT LONG-TERM CURRENT USE OF INSULIN (HCC): Primary | ICD-10-CM

## 2017-05-21 RX ORDER — METFORMIN HYDROCHLORIDE 500 MG/1
500 TABLET ORAL 2 TIMES DAILY WITH MEALS
Qty: 60 TAB | Refills: 3 | Status: SHIPPED | OUTPATIENT
Start: 2017-05-21 | End: 2017-09-25 | Stop reason: SDUPTHER

## 2017-05-23 ENCOUNTER — APPOINTMENT (OUTPATIENT)
Dept: ULTRASOUND IMAGING | Age: 52
End: 2017-05-23
Attending: NURSE PRACTITIONER

## 2017-06-08 RX ORDER — OXYCODONE HYDROCHLORIDE 15 MG/1
15 TABLET ORAL 4 TIMES DAILY
Qty: 120 TAB | Refills: 0 | Status: SHIPPED | OUTPATIENT
Start: 2017-06-08 | End: 2017-06-19

## 2017-06-08 NOTE — TELEPHONE ENCOUNTER
The pt came in to the office as a walk in. She states that her appt was rescheduled due to provider issues and will need a prescription for her oxycodone. Her appt is not until 06/19/17. A  was obtained and there were no aberrancies noted. She last filled her exalgo on 05/23/17 and this get her to her appt. The pt last filled her oxycodone on 05/09/17. She will be given one month of her medication. It will be sent over to [facility] [place] via [address]". Pt is w/c and uses this pharmacy for her medication.

## 2017-06-16 ENCOUNTER — TELEPHONE (OUTPATIENT)
Dept: FAMILY MEDICINE CLINIC | Age: 52
End: 2017-06-16

## 2017-06-16 NOTE — TELEPHONE ENCOUNTER
Patient had her  2017 PAP  at [facility] during which she she experienced discomfort and  after which she experienced excessive bleeding. .She  called because she received a letter from [facility] stating she needs a follow up (endometrail biopsy)  [place]. Patient is declining services from [facility]. An appointment was made at [address] [place] [address] on June 22,2017 at 11:00am.    Patient expressed understanding that she must take [facility] letters and info to her  appointment so blaire tthey may obtain lab records.

## 2017-06-19 ENCOUNTER — OFFICE VISIT (OUTPATIENT)
Dept: PAIN MANAGEMENT | Age: 52
End: 2017-06-19

## 2017-06-19 VITALS
SYSTOLIC BLOOD PRESSURE: 125 MMHG | HEART RATE: 76 BPM | WEIGHT: 205 LBS | BODY MASS INDEX: 28.59 KG/M2 | DIASTOLIC BLOOD PRESSURE: 84 MMHG

## 2017-06-19 DIAGNOSIS — M54.5 CHRONIC MIDLINE LOW BACK PAIN, WITH SCIATICA PRESENCE UNSPECIFIED: ICD-10-CM

## 2017-06-19 DIAGNOSIS — M54.2 CERVICALGIA: ICD-10-CM

## 2017-06-19 DIAGNOSIS — G89.29 CHRONIC MIDLINE LOW BACK PAIN, WITH SCIATICA PRESENCE UNSPECIFIED: ICD-10-CM

## 2017-06-19 DIAGNOSIS — G89.4 CHRONIC PAIN SYNDROME: ICD-10-CM

## 2017-06-19 DIAGNOSIS — M51.37 DEGENERATION OF LUMBAR OR LUMBOSACRAL INTERVERTEBRAL DISC: ICD-10-CM

## 2017-06-19 DIAGNOSIS — M50.30 DEGENERATION OF CERVICAL INTERVERTEBRAL DISC: ICD-10-CM

## 2017-06-19 DIAGNOSIS — M54.2 NECK PAIN: Primary | ICD-10-CM

## 2017-06-19 PROBLEM — M54.50 CHRONIC MIDLINE LOW BACK PAIN: Status: ACTIVE | Noted: 2017-06-19

## 2017-06-19 RX ORDER — NALOXONE HYDROCHLORIDE 4 MG/.1ML
4 SPRAY NASAL AS NEEDED
Qty: 1 BOX | Refills: 1 | Status: SHIPPED | OUTPATIENT
Start: 2017-06-19 | End: 2017-06-19

## 2017-06-19 RX ORDER — NALOXONE HYDROCHLORIDE 4 MG/.1ML
4 SPRAY NASAL AS NEEDED
Qty: 1 BOX | Refills: 1 | Status: SHIPPED | OUTPATIENT
Start: 2017-06-19

## 2017-06-19 RX ORDER — HYDROMORPHONE HYDROCHLORIDE 12 MG/1
12 TABLET, EXTENDED RELEASE ORAL DAILY
Qty: 30 TAB | Refills: 0 | Status: SHIPPED | OUTPATIENT
Start: 2017-07-18 | End: 2017-08-16 | Stop reason: SDUPTHER

## 2017-06-19 RX ORDER — OXYCODONE HYDROCHLORIDE 15 MG/1
15 TABLET ORAL 4 TIMES DAILY
Qty: 120 TAB | Refills: 0 | Status: SHIPPED | OUTPATIENT
Start: 2017-07-18 | End: 2017-08-16 | Stop reason: SDUPTHER

## 2017-06-19 RX ORDER — HYDROMORPHONE HYDROCHLORIDE 12 MG/1
12 TABLET, EXTENDED RELEASE ORAL DAILY
Qty: 30 TAB | Refills: 0 | Status: SHIPPED | OUTPATIENT
Start: 2017-06-19 | End: 2017-06-19

## 2017-06-19 RX ORDER — OXYCODONE HYDROCHLORIDE 15 MG/1
15 TABLET ORAL 4 TIMES DAILY
Qty: 120 TAB | Refills: 0 | Status: SHIPPED | OUTPATIENT
Start: 2017-06-19 | End: 2017-06-19

## 2017-06-19 NOTE — PROGRESS NOTES
HISTORY OF PRESENT ILLNESS  Keira Sweet is a 54 y.o. female. HPI Comments: Meds help with pain control and quality of life. No new side effects reported today. Visit survey reviewed and will be scanned.  reviewed. Recent average level of pain(out of 10)-5-6  Chief complaint neck pain, back pain  Chronic pain  Using Topamax 50 mg twice a day  Extended release hydromorphone 12 mg once a day  Oxycodone 15 mg 4 times a day as needed  She was previously given a prescription for naloxone but states the pharmacy could not fill it. She will be given another prescription today  She reports Elavil as prescribed by her neurologist  Medication helps improve general activity, mood, walking, sleep, enjoyment of life              Review of Systems   Constitutional: Negative for chills, fever, malaise/fatigue and weight loss. HENT: Negative for congestion and sore throat. Eyes: Positive for blurred vision. Negative for double vision. Respiratory: Negative for cough and shortness of breath. Nonsmoker (quit 15 years ago)   Cardiovascular: Positive for leg swelling (right leg). Negative for chest pain. Gastrointestinal: Positive for nausea. Negative for constipation, diarrhea, heartburn and vomiting. Musculoskeletal: Positive for back pain, falls (no ED visit/stumbles often/fall risk), joint pain, myalgias and neck pain. Skin: Negative. Neurological: Positive for tingling, sensory change, speech change (slow/difficult to understand (advised to see neurology which she has)), weakness and headaches. Negative for dizziness (lightheaded occasional), tremors and seizures. Endo/Heme/Allergies: Positive for environmental allergies. Bruises/bleeds easily. Psychiatric/Behavioral: Positive for depression. Negative for suicidal ideas. The patient is nervous/anxious and has insomnia. Sees psychiatry       Physical Exam   Constitutional: She appears well-developed and well-nourished.  She is cooperative. She does not have a sickly appearance. HENT:   Head: Normocephalic and atraumatic. Right Ear: External ear normal. No drainage. Left Ear: External ear normal. No drainage. Nose: Nose normal.   Eyes: Lids are normal. Right eye exhibits no discharge. Left eye exhibits no discharge. Right conjunctiva has no hemorrhage. Left conjunctiva has no hemorrhage. Neck: Neck supple. No tracheal deviation present. No thyroid mass present. Pulmonary/Chest: Effort normal. No respiratory distress. Neurological: She is alert. No cranial nerve deficit. Skin: Skin is intact. No rash noted. Psychiatric: Her speech is normal. Her affect is not angry. She does not express inappropriate judgment. Nursing note and vitals reviewed. ASSESSMENT and PLAN  Encounter Diagnoses   Name Primary?  Neck pain Yes    Chronic pain syndrome     Cervicalgia     Degeneration of cervical intervertebral disc     Degeneration of lumbar or lumbosacral intervertebral disc     Chronic midline low back pain, with sciatica presence unspecified    No indicators for recent medication misuse.  reviewed. Pain Meds and Quality Of Life have been reviewed. Nonpharmacologic therapy and non-opioid pharmacologic therapy were considered. If opioid therapy is prescribed, this is only if the expected benefits are anticipated to outweigh risks. Possible changes to treatment plan considered. Support/education given as needed. Today-medications are as listed. No significant changes to medications. Follow up -- 2 months.

## 2017-06-19 NOTE — PROGRESS NOTES
Nursing Notes    Patient presents to the office today in follow-up. Patient rates her pain at 5/10 on the numerical pain scale. Reviewed medications with counts as follows:    Rx Date filled Qty Dispensed Pill Count Last Dose Short   hydromorphon 12 mg 05/23/17 30  1 today Yes 2 pills   oxycpdone 15 mg 06/09/17 120  86 today no       Comments: DR Vee Villarreal for her pap at [address] states that she could not get an appt with Joycelyn Coma she states that she took extra of the hydromorphon due to her inability to reach Joycelyn Coma. Pt states that Joycelyn Coma wanted her to get the  Narcan Nasal Spray. Pt states that it is the law that she has one. She would like to find out how to get one if possible. POC UDS was not performed in office today    Any new labs or imaging since last appointment? NO    Have you been to an emergency room (ER) or urgent care clinic since your last visit? NO            Have you been hospitalized since your last visit? NO     If yes, where, when, and reason for visit? Have you seen or consulted any other health care providers outside of the [address] [place] [address]  since your last visit? [facility] Dept for pap     If yes, where, when, and reason for visit? Ms. Jennifer Romero has a reminder for a \"due or due soon\" health maintenance. I have asked that she contact her primary care provider for follow-up on this health maintenance.

## 2017-06-19 NOTE — MR AVS SNAPSHOT
Visit Information Date & Time Provider Department Dept. Phone Encounter #  
 6/19/2017  4:15 PM Otis Hardy MD [facility] [phone] Follow-up Instructions Return in about 2 months (around 8/19/2017). Follow-up and Disposition History Your Appointments 6/27/2017 10:00 AM  
New Patient with Abdirashid Franco MD  
[facility] ([facility]) Appt Note: NEW PATIENT, ENDOMETRIOSIS, ID, INS CARD (REFERRED BY THE [facility]), MED LIST, ARRIVE 15 MIN EARLY, APPT LOCATION CONFIRMED WITH THE PATIENT; $0 BAL, 05/24/2017 VC; NEW PATIENT, ENDOMETRIOSIS, ID, INS CARD (REFERRED BY THE [facility]), MED LIST, ARRIVE 15 MIN EARLY, APPT LOCATION CONFIRMED WITH THE PATIENT; pt r/s from 6/15/17; APPT MADE BY THE [facility]; r/s from 06/22/17  
 [address] [facility] [address] [facility] [address] [facility] [address]  
[facility] [address] [address] [address] [facility]  
  
    
 9/5/2017  4:00 PM  
Follow Up with Max Brand MD  
[facility]) Appt Note: 6mon f/u  
 [address] [facility] [address] [facility] [address] [phone]  
  
   
 [facility] [address] Upcoming Health Maintenance Date Due  
 EYE EXAM RETINAL OR DILATED Q1 9/13/1977 Pneumococcal 19-64 Medium Risk (1 of 1 - PPSV23) 9/13/1986 MICROALBUMIN Q1 8/31/2016 INFLUENZA AGE 9 TO ADULT 8/1/2017 HEMOGLOBIN A1C Q6M 11/17/2017 FOOT EXAM Q1 5/17/2018 LIPID PANEL Q1 5/17/2018 PAP AKA CERVICAL CYTOLOGY 5/17/2020 DTaP/Tdap/Td series (2 - Td) 8/31/2025 Allergies as of 6/19/2017  Review Complete On: 6/19/2017 By: Otis Hardy MD  
  
 Severity Noted Reaction Type Reactions Latex  08/26/2014    Rash Lisinopril  06/07/2012   Intolerance Cough Ace Inhibitor - cough Current Immunizations  Reviewed on 8/31/2015 Name Date Influenza Vaccine 9/25/2015, 10/30/2013 Tdap 8/31/2015, 5/22/2014  6:52 PM  
  
 Not reviewed this visit You Were Diagnosed With   
  
 Codes Comments Neck pain    -  Primary ICD-10-CM: M54.2 ICD-9-CM: 723.1 Chronic pain syndrome     ICD-10-CM: G89.4 ICD-9-CM: 338.4 Cervicalgia     ICD-10-CM: M54.2 ICD-9-CM: 723.1 Degeneration of cervical intervertebral disc     ICD-10-CM: M50.30 ICD-9-CM: 722.4 Degeneration of lumbar or lumbosacral intervertebral disc     ICD-10-CM: M51.37 
ICD-9-CM: 722.52 Chronic midline low back pain, with sciatica presence unspecified     ICD-10-CM: M54.5, G89.29 ICD-9-CM: 724.2, 338.29 Vitals BP Pulse Weight(growth percentile) BMI OB Status Smoking Status 125/84 76 205 lb (93 kg) 28.59 kg/m2 Having regular periods Former Smoker BMI and BSA Data Body Mass Index Body Surface Area 28.59 kg/m 2 2.16 m 2 Preferred Pharmacy Pharmacy Name Phone [id number] [address] [phone] Your Updated Medication List  
  
   
This list is accurate as of: 6/19/17  5:03 PM.  Always use your most recent med list.  
  
  
  
  
 ACIDOPHILUS 500 million cell Tab Generic drug:  Lactobacillus acidophilus Take  by mouth daily. albuterol 90 mcg/actuation inhaler Commonly known as:  PROVENTIL HFA, VENTOLIN HFA, PROAIR HFA Take 2 Puffs by inhalation every four (4) hours as needed for Wheezing. allopurinol 100 mg tablet Commonly known as:  True Leash Take 1 Tab by mouth daily. carvedilol 25 mg tablet Commonly known as:  Melyssa Breed Take 1 Tab by mouth two (2) times a day. cholecalciferol (VITAMIN D3) 5,000 unit Tab tablet Commonly known as:  VITAMIN D3 Take  by mouth daily. cloNIDine HCl 0.1 mg tablet Commonly known as:  CATAPRES  
TAKE ONE TABLET BY MOUTH TWICE DAILY  
  
 cyanocobalamin 2,000 mcg Tber Take  by mouth daily. glucose blood VI test strips strip Commonly known as:  RELION PRIME TEST STRIPS Use as directed * HYDROmorphone 8 mg Tb24 Commonly known asCharol Revel ER Take 8 mg by mouth daily. Max Daily Amount: 8 mg. For chronic pain (due to fill 7/2/16) replaces opana ER  
  
 * HYDROmorphone ER 12 mg tablet Commonly known asCharol Revel ER Take 1 Tab by mouth daily. Max Daily Amount: 12 mg. Due to fill 4/28/17 For chronic pain  
  
 * HYDROmorphone ER 12 mg tablet Commonly known asCharol Revel ER Take 1 Tab by mouth daily. Max Daily Amount: 12 mg. For chronic pain Start taking on:  7/18/2017  
  
 lovastatin 20 mg tablet Commonly known as:  MEVACOR Take 1 Tab by mouth nightly. metFORMIN 500 mg tablet Commonly known as:  GLUCOPHAGE Take 1 Tab by mouth two (2) times daily (with meals). multivitamin tablet Commonly known as:  ONE A DAY Take 1 tablet by mouth daily. naloxone 4 mg/actuation Spry 4 mg by Nasal route as needed for up to 2 doses. Indications: OPIOID TOXICITY  
  
 * nortriptyline 50 mg capsule Commonly known as:  PAMELOR Take 1 Cap by mouth nightly. * nortriptyline 75 mg capsule Commonly known as:  PAMELOR Take 1 Cap by mouth daily. ondansetron hcl 8 mg tablet Commonly known as:  ZOFRAN (AS HYDROCHLORIDE) Take 1 Tab by mouth every eight (8) hours as needed for Nausea. OTHER Motorized Wheelchair  
  
 oxybutynin 5 mg tablet Commonly known as:  AHCYAVUF Take 1 Tab by mouth two (2) times a day. oxyCODONE IR 15 mg immediate release tablet Commonly known as:  Lola Benders Take 1 Tab by mouth four (4) times daily for 30 days. Max Daily Amount: 60 mg. Prn severe pain -    chronic  Indications: Pain Start taking on:  7/18/2017  
  
 oxyMORphone 20 mg ER tablet Commonly known as:  OPANA ER Take 20 mg by mouth every twelve (12) hours. spironolactone 25 mg tablet Commonly known as:  ALDACTONE Take 2 Tabs by mouth daily. VITAMIN C 250 mg tablet Generic drug:  ascorbic acid (vitamin C) Take  by mouth. * Notice: This list has 5 medication(s) that are the same as other medications prescribed for you. Read the directions carefully, and ask your doctor or other care provider to review them with you. Prescriptions Printed Refills  
 naloxone 4 mg/actuation spry 1 Si mg by Nasal route as needed for up to 2 doses. Indications: OPIOID TOXICITY Class: Print Route: Nasal  
 oxyCODONE IR (ROXICODONE) 15 mg immediate release tablet 0 Starting on: 2017 Sig: Take 1 Tab by mouth four (4) times daily for 30 days. Max Daily Amount: 60 mg. Prn severe pain -    chronic  Indications: Pain Class: Print Route: Oral  
 HYDROmorphone ER (EXALGO ER) 12 mg tablet 0 Starting on: 2017 Sig: Take 1 Tab by mouth daily. Max Daily Amount: 12 mg. For chronic pain  
 Class: Print Route: Oral  
  
Follow-up Instructions Return in about 2 months (around 2017). Introducing \[facility]\"" & [facility] SERVICES! [facility] introduces [employer] patient portal. Now you can access parts of your medical record, email your doctor's office, and request medication refills online. 1. In your internet browser, go to [email or web address] 2. Click on the First Time User? Click Here link in the Sign In box. You will see the New Member Sign Up page. 3. Enter your [employer] Access Code exactly as it appears below. You will not need to use this code after youve completed the sign-up process. If you do not sign up before the expiration date, you must request a new code. · [employer] Access Code: [email or web address] Expires: 2017  4:59 PM 
 
4. Enter the last four digits of your Social Security Number (xxxx) and Date of Birth (mm/dd/yyyy) as indicated and click Submit. You will be taken to the next sign-up page. 5. Create a [employer] ID.  This will be your [employer] login ID and cannot be changed, so think of one that is secure and easy to remember. 6. Create a [employer] password. You can change your password at any time. 7. Enter your Password Reset Question and Answer. This can be used at a later time if you forget your password. 8. Enter your e-mail address. You will receive e-mail notification when new information is available in [address] 9. Click Sign Up. You can now view and download portions of your medical record. 10. Click the Download Summary menu link to download a portable copy of your medical information. If you have questions, please visit the Frequently Asked Questions section of the [employer] website. Remember, [employer] is NOT to be used for urgent needs. For medical emergencies, dial 911. Now available from your iPhone and Android! Please provide this summary of care documentation to your next provider. Your primary care clinician is listed as [address]. If you have any questions after today's visit, please call [phone].

## 2017-07-05 RX ORDER — LOVASTATIN 20 MG/1
20 TABLET ORAL
Qty: 30 TAB | Refills: 5 | Status: SHIPPED | OUTPATIENT
Start: 2017-07-05 | End: 2019-04-22

## 2017-07-13 ENCOUNTER — OFFICE VISIT (OUTPATIENT)
Dept: OBGYN CLINIC | Age: 52
End: 2017-07-13

## 2017-07-13 ENCOUNTER — HOSPITAL ENCOUNTER (OUTPATIENT)
Dept: LAB | Age: 52
Discharge: HOME | End: 2017-07-13
Payer: MEDICARE

## 2017-07-13 VITALS
SYSTOLIC BLOOD PRESSURE: 124 MMHG | BODY MASS INDEX: 28.7 KG/M2 | WEIGHT: 205 LBS | HEART RATE: 86 BPM | DIASTOLIC BLOOD PRESSURE: 72 MMHG | HEIGHT: 71 IN

## 2017-07-13 DIAGNOSIS — N92.0 MENORRHAGIA WITH REGULAR CYCLE: ICD-10-CM

## 2017-07-13 DIAGNOSIS — N94.6 DYSMENORRHEA: ICD-10-CM

## 2017-07-13 DIAGNOSIS — N94.6 DYSMENORRHEA: Primary | ICD-10-CM

## 2017-07-13 DIAGNOSIS — G89.21 CHRONIC PAIN DUE TO TRAUMA: ICD-10-CM

## 2017-07-13 LAB
ERYTHROCYTE [DISTWIDTH] IN BLOOD BY AUTOMATED COUNT: 14.8 % (ref 11.6–14.5)
HCT VFR BLD AUTO: 40.3 % (ref 35–45)
HGB BLD-MCNC: 13.3 G/DL (ref 12–16)
MCH RBC QN AUTO: 24.2 PG (ref 24–34)
MCHC RBC AUTO-ENTMCNC: 33 G/DL (ref 31–37)
MCV RBC AUTO: 73.3 FL (ref 74–97)
PLATELET # BLD AUTO: 249 K/UL (ref 135–420)
PMV BLD AUTO: 12.6 FL (ref 9.2–11.8)
RBC # BLD AUTO: 5.5 M/UL (ref 4.2–5.3)
TSH SERPL DL<=0.05 MIU/L-ACNC: 1.24 UIU/ML (ref 0.36–3.74)
WBC # BLD AUTO: 6.6 K/UL (ref 4.6–13.2)

## 2017-07-13 PROCEDURE — 85027 COMPLETE CBC AUTOMATED: CPT | Performed by: OBSTETRICS & GYNECOLOGY

## 2017-07-13 PROCEDURE — 84443 ASSAY THYROID STIM HORMONE: CPT | Performed by: OBSTETRICS & GYNECOLOGY

## 2017-07-13 RX ORDER — IBUPROFEN 600 MG/1
600 TABLET ORAL
Qty: 60 TAB | Refills: 3 | Status: SHIPPED | OUTPATIENT
Start: 2017-07-13 | End: 2018-06-07 | Stop reason: SDUPTHER

## 2017-07-13 NOTE — MR AVS SNAPSHOT
Visit Information Date & Time Provider Department Dept. Phone Encounter #  
 7/13/2017  9:30 AM Abner Christensenmodedrick [id number] Your Appointments 8/16/2017  3:00 PM  
Follow Up with Brinda Kayser, PA [address] [place] [address] for Pain Management ([facility] SCHEDULING) Appt Note: return in 2 months [facility] [address]  
[phone] [address]  
  
    
 9/5/2017  4:00 PM  
Follow Up with Jacek Avila MD  
[address] [place] [address]) Appt Note: 6mon f/u  
 [address] [address]  
[phone]  
  
   
 [place] [address]  
  
    
 10/11/2017  3:40 PM  
Follow Up with Pattie Reina MD  
[address] [place] [address] for Pain Management [facility]) Appt Note: return in  3 months [facility] [address]  
[phone] [id number] Upcoming Health Maintenance Date Due INFLUENZA AGE 9 TO ADULT 8/1/2017 PAP AKA CERVICAL CYTOLOGY 5/17/2020 Allergies as of 7/13/2017  Review Complete On: 7/13/2017 By: Aleksey Groves MD  
  
 Severity Noted Reaction Type Reactions Latex  08/26/2014    Rash Lisinopril  06/07/2012   Intolerance Cough Ace Inhibitor - cough Current Immunizations  Reviewed on 8/31/2015 Name Date Influenza Vaccine 9/25/2015, 10/30/2013 Tdap 8/31/2015, 5/22/2014  6:52 PM  
  
 Not reviewed this visit You Were Diagnosed With   
  
 Codes Comments Dysmenorrhea    -  Primary ICD-10-CM: N94.6 ICD-9-CM: 625.3 Menorrhagia with regular cycle     ICD-10-CM: N92.0 ICD-9-CM: 626.2 Chronic pain due to trauma     ICD-10-CM: G89.21 ICD-9-CM: 338.21 Vitals BP Pulse Height(growth percentile) Weight(growth percentile) BMI OB Status 124/72 (BP 1 Location: Left arm, BP Patient Position: Sitting) 86 5' 11\" (1.803 m) 205 lb (93 kg) 28.59 kg/m2 Having regular periods Smoking Status Current Some Day Smoker BMI and BSA Data Body Mass Index Body Surface Area 28.59 kg/m 2 2.16 m 2 Preferred Pharmacy [email or web address] [address]. [phone] Your Updated Medication List  
  
   
This list is accurate as of: 7/13/17 10:42 AM.  Always use your most recent med list.  
  
  
  
  
 ACIDOPHILUS 500 million cell Tab Generic drug:  Lactobacillus acidophilus Take  by mouth daily. albuterol 90 mcg/actuation inhaler Commonly known as:  PROVENTIL HFA, VENTOLIN HFA, PROAIR HFA Take 2 Puffs by inhalation every four (4) hours as needed for Wheezing. allopurinol 100 mg tablet Commonly known as:  Theresa Townsend Take 1 Tab by mouth daily. carvedilol 25 mg tablet Commonly known as:  Santiago Dole Take 1 Tab by mouth two (2) times a day. cholecalciferol (VITAMIN D3) 5,000 unit Tab tablet Commonly known as:  VITAMIN D3 Take  by mouth daily. cloNIDine HCl 0.1 mg tablet Commonly known as:  CATAPRES  
TAKE ONE TABLET BY MOUTH TWICE DAILY  
  
 cyanocobalamin 2,000 mcg Tber Take  by mouth daily. glucose blood VI test strips strip Commonly known as:  RELION PRIME TEST STRIPS Use as directed * HYDROmorphone 8 mg Tb24 Commonly known asMelba Cape ER Take 8 mg by mouth daily. Max Daily Amount: 8 mg. For chronic pain (due to fill 7/2/16) replaces opana ER  
  
 * HYDROmorphone ER 12 mg tablet Commonly known asMelba Cape ER Take 1 Tab by mouth daily. Max Daily Amount: 12 mg. Due to fill 4/28/17 For chronic pain  
  
 * HYDROmorphone ER 12 mg tablet Commonly known asMelba Cape ER Take 1 Tab by mouth daily. Max Daily Amount: 12 mg. For chronic pain Start taking on:  7/18/2017 lovastatin 20 mg tablet Commonly known as:  MEVACOR Take 1 Tab by mouth nightly. metFORMIN 500 mg tablet Commonly known as:  GLUCOPHAGE Take 1 Tab by mouth two (2) times daily (with meals). multivitamin tablet Commonly known as:  ONE A DAY Take 1 tablet by mouth daily. naloxone 4 mg/actuation Spry 4 mg by Nasal route as needed for up to 2 doses. Indications: OPIOID TOXICITY  
  
 * nortriptyline 50 mg capsule Commonly known as:  PAMELOR Take 1 Cap by mouth nightly. * nortriptyline 75 mg capsule Commonly known as:  PAMELOR Take 1 Cap by mouth daily. ondansetron hcl 8 mg tablet Commonly known as:  ZOFRAN (AS HYDROCHLORIDE) Take 1 Tab by mouth every eight (8) hours as needed for Nausea. OTHER Motorized Wheelchair  
  
 oxybutynin 5 mg tablet Commonly known as:  AZECHXGT Take 1 Tab by mouth two (2) times a day. oxyCODONE IR 15 mg immediate release tablet Commonly known as:  Catarino Laser Take 1 Tab by mouth four (4) times daily for 30 days. Max Daily Amount: 60 mg. Prn severe pain -    chronic  Indications: Pain Start taking on:  7/18/2017  
  
 oxyMORphone 20 mg ER tablet Commonly known as:  OPANA ER Take 20 mg by mouth every twelve (12) hours. spironolactone 25 mg tablet Commonly known as:  ALDACTONE Take 2 Tabs by mouth daily. VITAMIN C 250 mg tablet Generic drug:  ascorbic acid (vitamin C) Take  by mouth. * Notice: This list has 5 medication(s) that are the same as other medications prescribed for you. Read the directions carefully, and ask your doctor or other care provider to review them with you. To-Do List   
 07/19/2017 Imaging:  US TRANSVAGINAL Patient Instructions Painful Menstrual Cramps: Care Instructions Your Care Instructions Painful menstrual cramps are very common. Many women go to the doctor because of bad cramps when they get their period. You may have cramps in your back, thighs, and belly. You may also have diarrhea, constipation, or nausea. Some women also get dizzy. Pain medicine and home treatment can help you feel better. Follow-up care is a key part of your treatment and safety. Be sure to make and go to all appointments, and call your doctor if you are having problems. It's also a good idea to know your test results and keep a list of the medicines you take. How can you care for yourself at home? · Take anti-inflammatory medicines for pain. Ibuprofen (Advil, Motrin) and naproxen (Aleve) usually work better than aspirin. ¨ Be safe with medicines. Talk to your doctor or pharmacist before you take any of these medicines. They may not be safe if you take other medicines or have other health problems. ¨ Start taking the recommended dose of pain medicine as soon as you start to feel pain. Or you can start on the day before your period. Keep taking the medicine for as many days as you have cramps. ¨ If anti-inflammatory medicines don't help, try acetaminophen (Tylenol). ¨ Do not take two or more pain medicines at the same time unless the doctor told you to. Many pain medicines have acetaminophen, which is Tylenol. Too much acetaminophen (Tylenol) can be harmful. ¨ Read and follow all instructions on the label. · Put a heating pad set on low or a hot water bottle on your belly. Or take a warm bath. Heat improves blood flow and may help with pain. · Lie down and put a pillow under your knees. Or lie on your side and bring your knees up to your chest. This will help with any back pressure. · Get at least 30 minutes of exercise on most days of the week. This improves blood flow and may decrease pain. Walking is a good choice. You also may want to do other activities, such as running, swimming, cycling, or playing tennis or team sports. When should you call for help? Call 911 anytime you think you may need emergency care.  For example, call if: 
· You passed out (lost consciousness). Call your doctor now or seek immediate medical care if: 
· You have sudden, severe pain in your belly or pelvis. · You have severe vaginal bleeding. This means that you are soaking through your usual pads or tampons every hour for 2 or more hours and passing clots of blood. · You are dizzy or lightheaded, or you feel like you may faint. Watch closely for changes in your health, and be sure to contact your doctor if: 
· You think you may be pregnant. · You have new belly or pelvic pain. · You are taking pain medicine, but it is not helping. · You feel weak and tired. Where can you learn more? Go to http://uriel-percy.info/. Enter [id number] in the search box to learn more about \"Painful Menstrual Cramps: Care Instructions. \" Current as of: October 13, 2016 Content Version: 11.3 © 5044-8227 [employer]. Care instructions adapted under license by [employer] (which disclaims liability or warranty for this information). If you have questions about a medical condition or this instruction, always ask your healthcare professional. [address] any warranty or liability for your use of this information. Endometrial Biopsy: About This Test 
What is it? An endometrial biopsy is a way for your doctor to take a small sample of the lining of the uterus (endometrium). The sample is looked at under a microscope for abnormal cells. An endometrial biopsy helps your doctor find problems in the endometrium. Why is this test done? An endometrial biopsy is done to check for cancer of the uterus. The test is also done if you have abnormal bleeding from your uterus or are having problems getting pregnant. The test results show how your body's hormones are affecting the lining of the uterus.  
How can you prepare for the test? 
Talk to your doctor about all your health conditions before the test. For example, tell your doctor if you: · Are or might be pregnant. An endometrial biopsy is not done during pregnancy. · Are taking any medicines. · Are allergic to any medicines. · Have had bleeding problems, or if you take aspirin or some other blood thinner. · Have been treated for an infection in your pelvic area. · Have any heart or lung problems. Other ways to prepare: · Do not douche, use tampons, or use vaginal medicines for 24 hours before the test. 
· Ask your doctor if you should take a pain reliever, such as ibuprofen (Advil or Motrin), 30 to 60 minutes before the test. This can help reduce any cramping pain that the test can cause. · Talk to your doctor if you have concerns about the need for the test, its risks, how it will be done, or what the results may mean. What happens before the test? 
· You will empty your bladder just before the test. 
· You will be asked to sign a consent form that says you understand the risks of the test and agree to have it done. What happens during the test? 
· You will lie on an exam table. Your feet will be in stirrups. · The doctor may use a spray or injection to numb your cervix. The cervix is the opening to the uterus. · The doctor will use a tool called a speculum to see the cervix. · Then the doctor will pass a thin tube through the cervix to take a sample of the uterus lining. You may feel a sharp cramp when the doctor collects the sample. · The sample is sent to a lab. How long does the test take? The test will take about 5 to 15 minutes. What happens after the test? 
· You will probably be able to go home right away. · You likely will have mild vaginal bleeding and may have cramps for a few days after the test. The cramps may feel like bad menstrual cramps.  
· Ask your doctor if you can take an over-the-counter pain medicine, such as acetaminophen (Tylenol), ibuprofen (Advil, Motrin), or naproxen (Aleve). Be safe with medicines. Read and follow all instructions on the label. · Do not have sex, use tampons, or douche until the spotting stops. Use pads for vaginal bleeding or discharge. · Do not do strenuous exercise or heavy lifting for one day after your biopsy. Follow-up care is a key part of your treatment and safety. Be sure to make and go to all appointments, and call your doctor if you are having problems. It's also a good idea to keep a list of the medicines you take. Ask your doctor when you can expect to have your test results. Where can you learn more? Go to http://uriel-percy.info/. Enter [phone] in the search box to learn more about \"Endometrial Biopsy: About This Test.\" Current as of: October 13, 2016 Content Version: 11.3 © 8368-6658 [employer]. Care instructions adapted under license by [employer] (which disclaims liability or warranty for this information). If you have questions about a medical condition or this instruction, always ask your healthcare professional. [address] any warranty or liability for your use of this information. Heavy Menstrual Periods: Care Instructions Your Care Instructions Many women get heavy menstrual periods and painful cramps. For some women, this means passing large blood clots and changing sanitary pads or tampons often. You may also have periods that last longer than 7 days. A change in hormones or an irritation in the uterus can cause heavy bleeding. Women who are overweight are more likely to have heavy menstrual periods. But there may not be a specific cause for your heavy menstrual periods. Your doctor may recommend hormone treatments to slow or stop your periods.  If a fibroid (a growth that is not cancer) is causing your heavy bleeding, your doctor may recommend surgery or other treatments to remove the growth. Because blood loss from heavy menstrual periods can make you very tired and weak (anemic), your doctor may recommend that you take extra iron. Follow-up care is a key part of your treatment and safety. Be sure to make and go to all appointments, and call your doctor if you are having problems. It's also a good idea to know your test results and keep a list of the medicines you take. How can you care for yourself at home? · Get plenty of rest. 
· Keep a record of your periods. Write down when your period begins and ends and how much flow you have. That means counting the number of pads and tampons you use. Note whether they are soaked. Note any other symptoms. Take this record to your doctor appointments. · Take your medicines exactly as prescribed. Call your doctor if you think you are having a problem with your medicine. · Take pain medicines exactly as directed. ¨ If the doctor gave you a prescription medicine for pain, take it as prescribed. ¨ If you are not taking a prescription pain medicine, ask your doctor if you can take an over-the-counter medicine. · Try to reach a healthy weight. If you are trying to lose weight, do it slowly with your doctor's advice. · If you are taking iron pills: ¨ Try to take the pills about 1 hour before or 2 hours after meals. But you may need to take iron with some food to avoid an upset stomach. ¨ Vitamin C (from food or pills) helps your body absorb iron. Try taking iron pills with a glass of orange juice or other citrus fruit juice. ¨ Do not take antacids or drink milk or caffeine drinks (such as coffee, tea, or cola) at the same time or within 2 hours of the time that you take your iron. They can make it hard for your body to absorb the iron. ¨ Iron pills may cause stomach problems, such as heartburn, nausea, diarrhea, constipation, and cramps. Be sure to drink plenty of fluids, and include fruits, vegetables, and fiber in your diet each day. ¨ If you forget to take an iron pill, do not take a double dose of iron the next time you take a pill. ¨ Keep iron pills out of the reach of small children. An overdose of iron can be very dangerous. When should you call for help? Call 911 anytime you think you may need emergency care. For example, call if: 
· You passed out (lost consciousness). · You have sudden, severe pain in your belly or pelvis. Call your doctor now or seek immediate medical care if: 
· You have severe vaginal bleeding. This means that you are soaking through your usual pads or tampons each hour for 2 or more hours. · You are dizzy or lightheaded, or you feel like you may faint. · You have belly or pelvic pain when not menstruating. · You have a fever. · You have vaginal discharge with a bad odor. Watch closely for changes in your health, and be sure to contact your doctor if: 
· Your heavy periods are disrupting your life. · You have vaginal bleeding when you do not expect it or after menopause. · You do not get better as expected. Where can you learn more? Go to http://uriel-percy.info/. Enter F477 in the search box to learn more about \"Heavy Menstrual Periods: Care Instructions. \" Current as of: October 13, 2016 Content Version: 11.3 © 4792-8870 [employer]. Care instructions adapted under license by [employer] (which disclaims liability or warranty for this information). If you have questions about a medical condition or this instruction, always ask your healthcare professional. [place] [address] any warranty or liability for your use of this information. Introducing [facility]! [employer] introduces [employer] patient portal. Now you can access parts of your medical record, email your doctor's office, and request medication refills online. 1. In your internet browser, go to [email or web address]://[email or web address]. [employer] 2. Click on the First Time User? Click Here link in the Sign In box. You will see the New Member Sign Up page. 3. Enter your eBoox Access Code exactly as it appears below. You will not need to use this code after youve completed the sign-up process. If you do not sign up before the expiration date, you must request a new code. · eBoox Access Code: [email or web address] Expires: 10/11/2017 10:42 AM 
 
4. Enter the last four digits of your Social Security Number (xxxx) and Date of Birth (mm/dd/yyyy) as indicated and click Submit. You will be taken to the next sign-up page. 5. Create a eBoox ID. This will be your eBoox login ID and cannot be changed, so think of one that is secure and easy to remember. 6. Create a eBoox password. You can change your password at any time. 7. Enter your Password Reset Question and Answer. This can be used at a later time if you forget your password. 8. Enter your e-mail address. You will receive e-mail notification when new information is available in [address] 9. Click Sign Up. You can now view and download portions of your medical record. 10. Click the Download Summary menu link to download a portable copy of your medical information. If you have questions, please visit the Frequently Asked Questions section of the eBoox website. Remember, eBoox is NOT to be used for urgent needs. For medical emergencies, dial 911. Now available from your iPhone and Android! Please provide this summary of care documentation to your next provider. Your primary care clinician is listed as [address] If you have any questions after today's visit, please call [phone].

## 2017-07-13 NOTE — PATIENT INSTRUCTIONS
Painful Menstrual Cramps: Care Instructions  Your Care Instructions    Painful menstrual cramps are very common. Many women go to the doctor because of bad cramps when they get their period. You may have cramps in your back, thighs, and belly. You may also have diarrhea, constipation, or nausea. Some women also get dizzy. Pain medicine and home treatment can help you feel better. Follow-up care is a key part of your treatment and safety. Be sure to make and go to all appointments, and call your doctor if you are having problems. It's also a good idea to know your test results and keep a list of the medicines you take. How can you care for yourself at home? · Take anti-inflammatory medicines for pain. Ibuprofen (Advil, Motrin) and naproxen (Aleve) usually work better than aspirin. ¨ Be safe with medicines. Talk to your doctor or pharmacist before you take any of these medicines. They may not be safe if you take other medicines or have other health problems. ¨ Start taking the recommended dose of pain medicine as soon as you start to feel pain. Or you can start on the day before your period. Keep taking the medicine for as many days as you have cramps. ¨ If anti-inflammatory medicines don't help, try acetaminophen (Tylenol). ¨ Do not take two or more pain medicines at the same time unless the doctor told you to. Many pain medicines have acetaminophen, which is Tylenol. Too much acetaminophen (Tylenol) can be harmful. ¨ Read and follow all instructions on the label. · Put a heating pad set on low or a hot water bottle on your belly. Or take a warm bath. Heat improves blood flow and may help with pain. · Lie down and put a pillow under your knees. Or lie on your side and bring your knees up to your chest. This will help with any back pressure. · Get at least 30 minutes of exercise on most days of the week. This improves blood flow and may decrease pain. Walking is a good choice.  You also may want to do other activities, such as running, swimming, cycling, or playing tennis or team sports. When should you call for help? Call 911 anytime you think you may need emergency care. For example, call if:  · You passed out (lost consciousness). Call your doctor now or seek immediate medical care if:  · You have sudden, severe pain in your belly or pelvis. · You have severe vaginal bleeding. This means that you are soaking through your usual pads or tampons every hour for 2 or more hours and passing clots of blood. · You are dizzy or lightheaded, or you feel like you may faint. Watch closely for changes in your health, and be sure to contact your doctor if:  · You think you may be pregnant. · You have new belly or pelvic pain. · You are taking pain medicine, but it is not helping. · You feel weak and tired. Where can you learn more? Go to http://uriel-percy.info/. Enter [id number] in the search box to learn more about \"Painful Menstrual Cramps: Care Instructions. \"  Current as of: October 13, 2016  Content Version: 11.3  © 2789-9475 [employer] Care instructions adapted under license by [email or web address] (which disclaims liability or warranty for this information). If you have questions about a medical condition or this instruction, always ask your healthcare professional. [address] any warranty or liability for your use of this information. Endometrial Biopsy: About This Test  What is it? An endometrial biopsy is a way for your doctor to take a small sample of the lining of the uterus (endometrium). The sample is looked at under a microscope for abnormal cells. An endometrial biopsy helps your doctor find problems in the endometrium. Why is this test done? An endometrial biopsy is done to check for cancer of the uterus. The test is also done if you have abnormal bleeding from your uterus or are having problems getting pregnant.  The test results show how your body's hormones are affecting the lining of the uterus. How can you prepare for the test?  Talk to your doctor about all your health conditions before the test. For example, tell your doctor if you:  · Are or might be pregnant. An endometrial biopsy is not done during pregnancy. · Are taking any medicines. · Are allergic to any medicines. · Have had bleeding problems, or if you take aspirin or some other blood thinner. · Have been treated for an infection in your pelvic area. · Have any heart or lung problems. Other ways to prepare:  · Do not douche, use tampons, or use vaginal medicines for 24 hours before the test.  · Ask your doctor if you should take a pain reliever, such as ibuprofen (Advil or Motrin), 30 to 60 minutes before the test. This can help reduce any cramping pain that the test can cause. · Talk to your doctor if you have concerns about the need for the test, its risks, how it will be done, or what the results may mean. What happens before the test?  · You will empty your bladder just before the test.  · You will be asked to sign a consent form that says you understand the risks of the test and agree to have it done. What happens during the test?  · You will lie on an exam table. Your feet will be in stirrups. · The doctor may use a spray or injection to numb your cervix. The cervix is the opening to the uterus. · The doctor will use a tool called a speculum to see the cervix. · Then the doctor will pass a thin tube through the cervix to take a sample of the uterus lining. You may feel a sharp cramp when the doctor collects the sample. · The sample is sent to a lab. How long does the test take? The test will take about 5 to 15 minutes. What happens after the test?  · You will probably be able to go home right away. · You likely will have mild vaginal bleeding and may have cramps for a few days after the test. The cramps may feel like bad menstrual cramps.   · Ask your doctor if you can take an over-the-counter pain medicine, such as acetaminophen (Tylenol), ibuprofen (Advil, Motrin), or naproxen (Aleve). Be safe with medicines. Read and follow all instructions on the label. · Do not have sex, use tampons, or douche until the spotting stops. Use pads for vaginal bleeding or discharge. · Do not do strenuous exercise or heavy lifting for one day after your biopsy. Follow-up care is a key part of your treatment and safety. Be sure to make and go to all appointments, and call your doctor if you are having problems. It's also a good idea to keep a list of the medicines you take. Ask your doctor when you can expect to have your test results. Where can you learn more? Go to http://uriel-percy.info/. Enter [phone] in the search box to learn more about \"Endometrial Biopsy: About This Test.\"  Current as of: October 13, 2016  Content Version: 11.3  © 4429-3841 [employer]. Care instructions adapted under license by [employer] (which disclaims liability or warranty for this information). If you have questions about a medical condition or this instruction, always ask your healthcare professional. [address] any warranty or liability for your use of this information. Heavy Menstrual Periods: Care Instructions  Your Care Instructions    Many women get heavy menstrual periods and painful cramps. For some women, this means passing large blood clots and changing sanitary pads or tampons often. You may also have periods that last longer than 7 days. A change in hormones or an irritation in the uterus can cause heavy bleeding. Women who are overweight are more likely to have heavy menstrual periods. But there may not be a specific cause for your heavy menstrual periods. Your doctor may recommend hormone treatments to slow or stop your periods.  If a fibroid (a growth that is not cancer) is causing your heavy bleeding, your doctor may recommend surgery or other treatments to remove the growth. Because blood loss from heavy menstrual periods can make you very tired and weak (anemic), your doctor may recommend that you take extra iron. Follow-up care is a key part of your treatment and safety. Be sure to make and go to all appointments, and call your doctor if you are having problems. It's also a good idea to know your test results and keep a list of the medicines you take. How can you care for yourself at home? · Get plenty of rest.  · Keep a record of your periods. Write down when your period begins and ends and how much flow you have. That means counting the number of pads and tampons you use. Note whether they are soaked. Note any other symptoms. Take this record to your doctor appointments. · Take your medicines exactly as prescribed. Call your doctor if you think you are having a problem with your medicine. · Take pain medicines exactly as directed. ¨ If the doctor gave you a prescription medicine for pain, take it as prescribed. ¨ If you are not taking a prescription pain medicine, ask your doctor if you can take an over-the-counter medicine. · Try to reach a healthy weight. If you are trying to lose weight, do it slowly with your doctor's advice. · If you are taking iron pills:  ¨ Try to take the pills about 1 hour before or 2 hours after meals. But you may need to take iron with some food to avoid an upset stomach. ¨ Vitamin C (from food or pills) helps your body absorb iron. Try taking iron pills with a glass of orange juice or other citrus fruit juice. ¨ Do not take antacids or drink milk or caffeine drinks (such as coffee, tea, or cola) at the same time or within 2 hours of the time that you take your iron. They can make it hard for your body to absorb the iron. ¨ Iron pills may cause stomach problems, such as heartburn, nausea, diarrhea, constipation, and cramps.  Be sure to drink plenty of fluids, and include fruits, vegetables, and fiber in your diet each day. ¨ If you forget to take an iron pill, do not take a double dose of iron the next time you take a pill. ¨ Keep iron pills out of the reach of small children. An overdose of iron can be very dangerous. When should you call for help? Call 911 anytime you think you may need emergency care. For example, call if:  · You passed out (lost consciousness). · You have sudden, severe pain in your belly or pelvis. Call your doctor now or seek immediate medical care if:  · You have severe vaginal bleeding. This means that you are soaking through your usual pads or tampons each hour for 2 or more hours. · You are dizzy or lightheaded, or you feel like you may faint. · You have belly or pelvic pain when not menstruating. · You have a fever. · You have vaginal discharge with a bad odor. Watch closely for changes in your health, and be sure to contact your doctor if:  · Your heavy periods are disrupting your life. · You have vaginal bleeding when you do not expect it or after menopause. · You do not get better as expected. Where can you learn more? Go to http://uriel-percy.info/. Enter F477 in the search box to learn more about \"Heavy Menstrual Periods: Care Instructions. \"  Current as of: October 13, 2016  Content Version: 11.3  © 5288-2560 [employer]. Care instructions adapted under license by [employer] (which disclaims liability or warranty for this information). If you have questions about a medical condition or this instruction, always ask your healthcare professional. [place] [address] any warranty or liability for your use of this information. Operative Hysteroscopy: Before Your Procedure  What is an operative hysteroscopy? An operative hysteroscopy is a procedure to find and treat problems with your uterus. It may be done to remove growths from the uterus.  It may also be used to treat fertility problems or abnormal bleeding. The doctor will guide a lighted tube through the cervix and into the uterus. This tube is called a hysteroscope, or scope. The doctor will fill your uterus with liquid. This makes it easier to see the inside of your uterus with the scope. Then the doctor will put tools through the scope to do the procedure. Most of the liquid that the doctor puts in will flow out when the scope is removed. You will most likely go home the same day. Many women are able to go back to work the next day. But it depends on what was done and the type of work you do. Follow-up care is a key part of your treatment and safety. Be sure to make and go to all appointments, and call your doctor if you are having problems. It's also a good idea to know your test results and keep a list of the medicines you take. What happens before the procedure? Procedures can be stressful. This information will help you understand what you can expect. And it will help you safely prepare for your procedure. Preparing for the procedure  · Understand exactly what procedure is planned, along with the risks, benefits, and other options. · Tell your doctors ALL the medicines, vitamins, supplements, and herbal remedies you take. Some of these can increase the risk of bleeding or interact with anesthesia. · If you take blood thinners, such as warfarin (Coumadin), clopidogrel (Plavix), or aspirin, be sure to talk to your doctor. He or she will tell you if you should stop taking these medicines before your procedure. Make sure that you understand exactly what your doctor wants you to do. · Your doctor will tell you which medicines to take or stop before your procedure. You may need to stop taking certain medicines a week or more before the procedure. So talk to your doctor as soon as you can. · If you have an advance directive, let your doctor know.  It may include a living will and a durable power of  for health care. Bring a copy to the hospital. If you don't have one, you may want to prepare one. It lets your doctor and loved ones know your health care wishes. Doctors advise that everyone prepare these papers before any type of surgery or procedure. What happens on the day of the procedure? · Follow the instructions exactly about when to stop eating and drinking. If you don't, your procedure may be canceled. If your doctor has instructed you to take your medicines on the day of the procedure, take them with only a sip of water. · Take a bath or shower before you come in for your procedure. Do not apply lotions, perfumes, deodorants, or nail polish. · Take off all jewelry and piercings. And take out contact lenses, if you wear them. At the hospital or surgery center  · Bring a picture ID. · You will be kept comfortable and safe by your anesthesia provider. The anesthesia may make you sleep. Or it may just numb the area being worked on. · The procedure will probably take less than an hour. Going home  · Be sure you have someone to drive you home. Anesthesia and pain medicine make it unsafe for you to drive. · You will be given more specific instructions about recovering from your procedure. They will cover things like diet, wound care, follow-up care, driving, and getting back to your normal routine. When should you call your doctor? · You have questions or concerns. · You don't understand how to prepare for your procedure. · You become ill before the procedure (such as fever, flu, or a cold). · You need to reschedule or have changed your mind about having the procedure. Where can you learn more? Go to http://uriel-percy.info/. Enter M369 in the search box to learn more about \"Operative Hysteroscopy: Before Your Procedure. \"  Current as of: October 13, 2016  Content Version: 11.3  © 0146-1568 [employer]  Care instructions adapted under license by [employer] (which disclaims liability or warranty for this information). If you have questions about a medical condition or this instruction, always ask your healthcare professional. [address] any warranty or liability for your use of this information. Operative Hysteroscopy: What to Expect at [facility]    An operative hysteroscopy is a procedure to find and treat problems with your uterus. It may have been done to remove growths from the uterus. Or it may have been done to treat fertility problems or abnormal bleeding. You may have cramps and vaginal bleeding for several days. If the doctor filled your uterus with air during the procedure, you may have gas pains, a feeling of fullness in your belly, or shoulder pain. These symptoms usually go away in 1 or 2 days. If the doctor filled your uterus with liquid during the procedure, you may have watery vaginal discharge for a few days. Many women are able to return to work on the day after the procedure. But it depends on what was done during the procedure and the type of work you do. This care sheet gives you a general idea about how long it will take for you to recover. But each person recovers at a different pace. Follow the steps below to get better as quickly as possible. How can you care for yourself at home? Activity  · Rest when you feel tired. Getting enough sleep will help you recover. · Most women are able to return to work on the day after the procedure. · You may shower and take baths as usual.  · Ask your doctor when it is okay for you to have sex. · Talk about birth control with your doctor. Do not try to become pregnant until your doctor says it is okay. Diet  · You can eat your normal diet. If your stomach is upset, try bland, low-fat foods like plain rice, broiled chicken, toast, and yogurt. · You may notice that your bowel movements are not regular right after the procedure. This is common.  Try to avoid constipation and straining with bowel movements. You may want to take a fiber supplement every day. If you have not had a bowel movement after a couple of days, ask your doctor about taking a mild laxative. Medicines  · Your doctor will tell you if and when you can restart your medicines. He or she will also give you instructions about taking any new medicines. · If you take blood thinners, such as warfarin (Coumadin), clopidogrel (Plavix), or aspirin, be sure to talk to your doctor. He or she will tell you if and when to start taking those medicines again. Make sure that you understand exactly what your doctor wants you to do. · Be safe with medicines. Take pain medicines exactly as directed. ¨ If the doctor gave you a prescription medicine for pain, take it as prescribed. ¨ If you are not taking a prescription pain medicine, ask your doctor if you can take an over-the-counter medicine. ¨ Do not take two or more pain medicines at the same time unless the doctor told you to. Many pain medicines have acetaminophen, which is Tylenol. Too much acetaminophen (Tylenol) can be harmful. · If you think your pain medicine is making you sick to your stomach:  ¨ Take your medicine after meals (unless your doctor has told you not to). ¨ Ask your doctor for a different pain medicine. · If your doctor prescribed antibiotics, take them as directed. Do not stop taking them just because you feel better. You need to take the full course of antibiotics. Other instructions  · You may have some light vaginal bleeding. Wear sanitary pads if needed. Do not douche or use tampons until your doctor says it is okay. · You may want to use a heating pad on your belly to help with pain. Use a low heat setting. Follow-up care is a key part of your treatment and safety. Be sure to make and go to all appointments, and call your doctor if you are having problems.  It's also a good idea to know your test results and keep a list of the medicines you take. When should you call for help? Call 911 anytime you think you may need emergency care. For example, call if:  · You pass out (lose consciousness). · You have severe trouble breathing. · You have sudden chest pain and shortness of breath, or you cough up blood. · You have severe belly pain. Call your doctor now or seek immediate medical care if:  · You have severe vaginal bleeding. This means that you are soaking through your usual pads or tampons each hour for 2 or more hours. · You have vaginal discharge that has increased in amount or smells bad. · You are sick to your stomach or cannot keep fluids down. · You have pain that does not get better after you take pain medicine. · You have a fever over 100°F.  · You have trouble passing urine or stool. Watch closely for changes in your health, and be sure to contact your doctor if you have any problems. Where can you learn more? Go to http://uriel-percy.info/. Enter V457 in the search box to learn more about \"Operative Hysteroscopy: What to Expect at Home. \"  Current as of: October 13, 2016  Content Version: 11.3  © 5296-1427 [employer]. Care instructions adapted under license by [employer] (which disclaims liability or warranty for this information). If you have questions about a medical condition or this instruction, always ask your healthcare professional. [place] [address] any warranty or liability for your use of this information.

## 2017-07-13 NOTE — PROGRESS NOTES
Name: Henry Greer MRN: [id number]    YOB: 1965  Age: 54 y.o. Sex: female        Chief Complaint   Patient presents with    Endometriosis       HPI     1. Menorrhagia--> Periods last 8+ days, heavy flow, passes golf ball size clots, has use tampons and pads, uses a box of both per period, sometimes has accidents on her clothes, has to change pads/tampons 3 times per night    2. Dysmenorrhea--> Notes severe cramping before her period, tried midol and pamprin which is not very helpful, cramping lasts the entire time she is pregnant, 8/10, nothing makes it worse, meds makes it minimally better    3. Back pain--> was in an accident at her job, on chronic opiates, sees pain management    4. Endometriosis--> Told she has endometriosis, states that she was diagnosed from a biopsy taken from her vagina, her old ob-gyn is no longer alive    OB History      Para Term  AB Living    2 1 1  1 1    SAB TAB Ectopic Molar Multiple Live Births    1             Obstetric Comments        Periods regular, last 7-8 days, flow heavy, severe dysmenorrhea  History of sexually transmitted infections never  Last pap 2017           PGyn  History   Sexual Activity    Sexual activity: Yes    Partners: Male    Birth control/ protection: None         Past Medical History:   Diagnosis Date    Abnormal nuclear cardiac imaging test 07/10/2012    Mod mid to distal anterior septal ischemia. EF 47%. Mid to distal anterior septal hypk. Normal EKG portion of stress test.  Intermediate high risk.     Asthma     Back pain     injury at work 2014    Coronary atherosclerosis of native coronary artery     angiographically normal coronaries with dLAD bridging (2012)    Diabetes mellitus type II, controlled ([facility] [address]     Forgetfulness     since injury 2014    Fracture of left humerus     GERD (gastroesophageal reflux disease)     Gout     Headache     Hypertension     Lack of coordination since injury June 2014    Neck pain     injury at work June 2014    Obesity     S/P cardiac cath 07/10/2012    dLAD w/mild to mod bridging. Otherwise patent coronaries. At least mod LVH. EF 65%. Past Surgical History:   Procedure Laterality Date    HX APPENDECTOMY      HX GI      Gallbladder    HX HEART CATHETERIZATION  7/10/2012    HX ORTHOPAEDIC      left arm       Allergies   Allergen Reactions    Latex Rash    Lisinopril Cough     Ace Inhibitor - cough       Current Outpatient Prescriptions on File Prior to Visit   Medication Sig Dispense Refill    lovastatin (MEVACOR) 20 mg tablet Take 1 Tab by mouth nightly. 30 Tab 5    naloxone 4 mg/actuation spry 4 mg by Nasal route as needed for up to 2 doses. Indications: OPIOID TOXICITY 1 Box 1    [START ON 7/18/2017] oxyCODONE IR (ROXICODONE) 15 mg immediate release tablet Take 1 Tab by mouth four (4) times daily for 30 days. Max Daily Amount: 60 mg. Prn severe pain -    chronic  Indications: Pain 120 Tab 0    [START ON 7/18/2017] HYDROmorphone ER (EXALGO ER) 12 mg tablet Take 1 Tab by mouth daily. Max Daily Amount: 12 mg. For chronic pain 30 Tab 0    metFORMIN (GLUCOPHAGE) 500 mg tablet Take 1 Tab by mouth two (2) times daily (with meals). 60 Tab 3    oxybutynin (DITROPAN) 5 mg tablet Take 1 Tab by mouth two (2) times a day. 60 Tab 3    ondansetron hcl (ZOFRAN, AS HYDROCHLORIDE,) 8 mg tablet Take 1 Tab by mouth every eight (8) hours as needed for Nausea. 12 Tab 1    HYDROmorphone ER (EXALGO ER) 12 mg tablet Take 1 Tab by mouth daily. Max Daily Amount: 12 mg. Due to fill 4/28/17 For chronic pain 30 Tab 0    spironolactone (ALDACTONE) 25 mg tablet Take 2 Tabs by mouth daily. 60 Tab 3    nortriptyline (PAMELOR) 50 mg capsule Take 1 Cap by mouth nightly. 30 Cap 5    nortriptyline (PAMELOR) 75 mg capsule Take 1 Cap by mouth daily. 60 Cap 5    allopurinol (ZYLOPRIM) 100 mg tablet Take 1 Tab by mouth daily.  30 Tab 5    carvedilol (COREG) 25 mg tablet Take 1 Tab by mouth two (2) times a day. 60 Tab 5    cloNIDine HCl (CATAPRES) 0.1 mg tablet TAKE ONE TABLET BY MOUTH TWICE DAILY 60 Tab 3    glucose blood VI test strips (RELION PRIME TEST STRIPS) strip Use as directed 50 Strip 2    albuterol (PROVENTIL HFA, VENTOLIN HFA, PROAIR HFA) 90 mcg/actuation inhaler Take 2 Puffs by inhalation every four (4) hours as needed for Wheezing. 1 Inhaler 0    HYDROmorphone (EXALGO ER) 8 mg Tb24 Take 8 mg by mouth daily. Max Daily Amount: 8 mg. For chronic pain (due to fill 7/2/16) replaces opana ER 30 Tab 0    OxyMORPhone 20 mg PO ER tablet Take 20 mg by mouth every twelve (12) hours.  OTHER Motorized Wheelchair 1 Device prn    multivitamin (ONE A DAY) tablet Take 1 tablet by mouth daily.  ascorbic acid (VITAMIN C) 250 mg tablet Take  by mouth.  Cholecalciferol, Vitamin D3, 5,000 unit Tab Take  by mouth daily.  cyanocobalamin 2,000 mcg TbER Take  by mouth daily.  lactobacillus acidophilus (ACIDOPHILUS) 500 million cell Tab Take  by mouth daily. No current facility-administered medications on file prior to visit.         Social History     Social History    Marital status:      Spouse name: N/A    Number of children: N/A    Years of education: N/A     Occupational History    nurse assistant      Social History Main Topics    Smoking status: Current Some Day Smoker     Types: Cigarettes    Smokeless tobacco: Never Used      Comment: Smoke for 26 years    Alcohol use No    Drug use: No    Sexual activity: Yes     Partners: Male     Birth control/ protection: None     Other Topics Concern    Not on file     Social History Narrative       Family History   Problem Relation Age of Onset    Heart Attack Mother     Heart Disease Mother     Diabetes Mother     Pacemaker Mother     Stroke Father     Cancer Father     Hypertension Maternal Grandmother     Heart Attack Maternal Grandmother     Diabetes Maternal Grandmother     Cancer Maternal Grandmother     Hypertension Maternal Grandfather     Diabetes Maternal Grandfather     Hypertension Paternal Grandmother     Diabetes Paternal Grandmother     Breast Cancer Paternal Grandmother     Hypertension Paternal Grandfather     Diabetes Paternal Grandfather     Heart Attack Sister     Breast Cancer Maternal Aunt     Cancer Maternal Aunt     Breast Cancer Paternal Aunt     Cancer Maternal Uncle     Cancer Niece        Review of Systems   Constitutional: Negative. HENT: Positive for sore throat. Respiratory: Negative. Cardiovascular: Positive for chest pain. Negative for palpitations. Gastrointestinal: Positive for abdominal pain, nausea and vomiting. Negative for constipation and diarrhea. Genitourinary: Negative. Musculoskeletal: Positive for back pain and joint pain. Skin: Negative. Neurological: Positive for dizziness and headaches. Negative for tingling. Psychiatric/Behavioral: Negative. Visit Vitals    /72 (BP 1 Location: Left arm, BP Patient Position: Sitting)    Pulse 86    Ht 5' 11\" (1.803 m)    Wt 205 lb (93 kg)    BMI 28.59 kg/m2       GENERAL:  Well developed, well nourished, in no distress  NEURO/PSYCHE: Grossly intact, normal mood and affect  HEENT: Normal cephalic, atraumatic, good dentition, neck supple.  No thyromegaly  CV: regular rate and rhythm  LUNGS: clear to auscultation bilaterally, no wheezes, rhonchi or rales, good air entry with normal effort  ABDOMEN: + BS, soft without tenderness, no guarding, rebound or masses  EXTREMITIES: no edema or erythema noted  SKIN:  Warm, dry, no lesions  LYMPHATICS: No supraclavicular or inguinal nodes noted    PELVIC EXAM:  LABIA MAJORA: no masses, tenderness or lesions  LABIA MINORA: no masses, tenderness or lesions  CLITORIS: no masses, tenderness or lesions  URETHRA: normal appearing, no masses or tenderness  BLADDER: no fullness or tenderness  VAGINA: pink appearing vagina with dark blood in the vaginal vault, no lesions   PERINEUM: no masses, tenderness or lesions  CERVIX: No CMT or lesions  UTERUS: small, mobile, nontender  ADNEXA: nontender and no masses    No results found for this or any previous visit (from the past 24 hour(s)). 1. Dysmenorrhea  Recommend NSAIDs, discussed how to take, will give rx strength, may benefit from an IUD or hormones, pt interested in pregnancy    - US TRANSVAGINAL; Future  - CBC W/O DIFF; Future  - TSH 3RD GENERATION; Future    2. Menorrhagia with regular cycle  Reviewed getting labs and and US, that she would need a tissue specimen, reviewed office bx vs hysteroscopy and D&C briefly, all of her questions were answered    - US TRANSVAGINAL; Future  - CBC W/O DIFF; Future  - TSH 3RD GENERATION; Future    3.  Chronic pain due to trauma  On narcotics and nortriptyline, seeing pain management    F/U 2-3 weeks

## 2017-07-14 LAB
C TRACH RRNA SPEC QL NAA+PROBE: NEGATIVE
N GONORRHOEA RRNA SPEC QL NAA+PROBE: NEGATIVE
T VAGINALIS RRNA SPEC QL NAA+PROBE: NEGATIVE

## 2017-07-25 ENCOUNTER — HOSPITAL ENCOUNTER (OUTPATIENT)
Dept: ULTRASOUND IMAGING | Age: 52
Discharge: HOME | End: 2017-07-25
Attending: OBSTETRICS & GYNECOLOGY
Payer: MEDICARE

## 2017-07-25 DIAGNOSIS — N94.6 DYSMENORRHEA: ICD-10-CM

## 2017-07-25 DIAGNOSIS — N92.0 MENORRHAGIA WITH REGULAR CYCLE: ICD-10-CM

## 2017-07-25 PROCEDURE — 76830 TRANSVAGINAL US NON-OB: CPT

## 2017-08-10 ENCOUNTER — DOCUMENTATION ONLY (OUTPATIENT)
Dept: PAIN MANAGEMENT | Age: 52
End: 2017-08-10

## 2017-08-16 ENCOUNTER — OFFICE VISIT (OUTPATIENT)
Dept: PAIN MANAGEMENT | Age: 52
End: 2017-08-16

## 2017-08-16 VITALS
WEIGHT: 205 LBS | TEMPERATURE: 98.3 F | BODY MASS INDEX: 28.59 KG/M2 | OXYGEN SATURATION: 98 % | RESPIRATION RATE: 18 BRPM | HEART RATE: 74 BPM | DIASTOLIC BLOOD PRESSURE: 87 MMHG | SYSTOLIC BLOOD PRESSURE: 116 MMHG

## 2017-08-16 DIAGNOSIS — G89.29 CHRONIC MIDLINE LOW BACK PAIN, WITH SCIATICA PRESENCE UNSPECIFIED: ICD-10-CM

## 2017-08-16 DIAGNOSIS — M51.37 DEGENERATION OF LUMBAR OR LUMBOSACRAL INTERVERTEBRAL DISC: ICD-10-CM

## 2017-08-16 DIAGNOSIS — G89.21 CHRONIC PAIN DUE TO TRAUMA: ICD-10-CM

## 2017-08-16 DIAGNOSIS — M50.30 DEGENERATION OF CERVICAL INTERVERTEBRAL DISC: ICD-10-CM

## 2017-08-16 DIAGNOSIS — M54.5 CHRONIC MIDLINE LOW BACK PAIN, WITH SCIATICA PRESENCE UNSPECIFIED: ICD-10-CM

## 2017-08-16 DIAGNOSIS — G89.4 CHRONIC PAIN SYNDROME: ICD-10-CM

## 2017-08-16 DIAGNOSIS — M54.2 CERVICALGIA: ICD-10-CM

## 2017-08-16 RX ORDER — OXYCODONE HYDROCHLORIDE 15 MG/1
15 TABLET ORAL 4 TIMES DAILY
Qty: 120 TAB | Refills: 0 | Status: SHIPPED | OUTPATIENT
Start: 2017-10-01 | End: 2017-08-16 | Stop reason: SDUPTHER

## 2017-08-16 RX ORDER — OXYCODONE HYDROCHLORIDE 15 MG/1
15 TABLET ORAL
Qty: 120 TAB | Refills: 0 | Status: SHIPPED | OUTPATIENT
Start: 2017-09-02 | End: 2017-10-02

## 2017-08-16 RX ORDER — OXYCODONE HYDROCHLORIDE 15 MG/1
15 TABLET ORAL 4 TIMES DAILY
Qty: 120 TAB | Refills: 0 | Status: SHIPPED | OUTPATIENT
Start: 2017-09-02 | End: 2017-08-16 | Stop reason: SDUPTHER

## 2017-08-16 RX ORDER — OXYCODONE HYDROCHLORIDE 15 MG/1
15 TABLET ORAL
Qty: 120 TAB | Refills: 0 | Status: SHIPPED | OUTPATIENT
Start: 2017-10-01 | End: 2017-10-12 | Stop reason: SDUPTHER

## 2017-08-16 RX ORDER — HYDROMORPHONE HYDROCHLORIDE 12 MG/1
12 TABLET, EXTENDED RELEASE ORAL DAILY
Qty: 30 TAB | Refills: 0 | Status: SHIPPED | OUTPATIENT
Start: 2017-09-22 | End: 2017-10-12 | Stop reason: SDUPTHER

## 2017-08-16 RX ORDER — HYDROMORPHONE HYDROCHLORIDE 12 MG/1
12 TABLET, EXTENDED RELEASE ORAL DAILY
Qty: 30 TAB | Refills: 0 | Status: SHIPPED | OUTPATIENT
Start: 2017-08-23 | End: 2017-12-06 | Stop reason: SDUPTHER

## 2017-08-16 NOTE — PROGRESS NOTES
Nursing Notes    Patient presents to the office today in follow-up. Patient rates her pain at 5/10 on the numerical pain scale. Reviewed medications with counts as follows:    Rx Date filled Qty Dispensed Pill Count Last Dose Short   Oxycodone 15 mg 08/03/17 120 68 today no   Dilaudid 12 mg 07/24/17 30 6 today no         Comments:  Patient was seen at [place] [address] , she had an ultrasound done and pelvic exam done also   She states her pain level today is a 5 today       POC UDS was performed in office today    Any new labs or imaging since last appointment? YES [facility]  Ultrasound  And pelvic exam     Have you been to an emergency room (ER) or urgent care clinic since your last visit? NO            Have you been hospitalized since your last visit? NO     If yes, where, when, and reason for visit? Have you seen or consulted any other health care providers outside of the [address] [place] [address]  since your last visit? NO     If yes, where, when, and reason for visit? Ms. Malaika Chandler has a reminder for a \"due or due soon\" health maintenance. I have asked that she contact her primary care provider for follow-up on this health maintenance.

## 2017-08-16 NOTE — MR AVS SNAPSHOT
Visit Information Date & Time Provider Department Dept. Phone Encounter #  
 8/16/2017  3:00 PM Farrukh Lewis [address] [place] [address] for Pain Management [phone] [id number] Follow-up Instructions Return in about 2 months (around 10/16/2017). Your Appointments 8/18/2017  4:15 PM  
Office Visit with Thomas Manuel MD  
[facility] ([facility]) Appt Note: 3 wk f/u; RESCHEDULED FROM 08/04/2017  
 [address], [address] [facility] [address]  
[address], [address]  [facility]  
  
    
 9/5/2017  4:00 PM  
Follow Up with Nelson Gaffney MD  
[address] [place] [address]) Appt Note: 6mon f/u  
 [address] [facility] [address] [facility] [address]  
[phone]  
  
   
 [facility] [address]  
  
    
 10/11/2017  3:40 PM  
Follow Up with Alix Lopez MD  
[address] [place] [address] for Pain Management [facility] Appt Note: return in  3 months [address] [facility] [address]  
[phone] [facility] [address] [id number] Upcoming Health Maintenance Date Due  
 EYE EXAM RETINAL OR DILATED Q1 9/13/1977 Pneumococcal 19-64 Medium Risk (1 of 1 - PPSV23) 9/13/1986 MICROALBUMIN Q1 8/31/2016 INFLUENZA AGE 9 TO ADULT 8/1/2017 HEMOGLOBIN A1C Q6M 11/17/2017 FOOT EXAM Q1 5/17/2018 LIPID PANEL Q1 5/17/2018 PAP AKA CERVICAL CYTOLOGY 5/17/2020 DTaP/Tdap/Td series (2 - Td) 8/31/2025 Allergies as of 8/16/2017  Review Complete On: 8/16/2017 By: KRAIG Garcia Severity Noted Reaction Type Reactions Latex  08/26/2014    Rash Lisinopril  06/07/2012   Intolerance Cough Ace Inhibitor - cough Current Immunizations  Reviewed on 8/31/2015 Name Date Influenza Vaccine 9/25/2015, 10/30/2013 Tdap 8/31/2015, 5/22/2014  6:52 PM  
  
 Not reviewed this visit You Were Diagnosed With   
  
 Codes Comments Chronic pain due to trauma     ICD-10-CM: G89.21 ICD-9-CM: 338.21 Cervicalgia     ICD-10-CM: M54.2 ICD-9-CM: 723.1 Degeneration of cervical intervertebral disc     ICD-10-CM: M50.30 ICD-9-CM: 722.4 Degeneration of lumbar or lumbosacral intervertebral disc     ICD-10-CM: M51.37 
ICD-9-CM: 722.52 Chronic pain syndrome     ICD-10-CM: G89.4 ICD-9-CM: 338. 4 Chronic midline low back pain, with sciatica presence unspecified     ICD-10-CM: M54.5, G89.29 ICD-9-CM: 724.2, 338.29 Vitals BP Pulse Temp Resp Weight(growth percentile) SpO2  
 116/87 (BP 1 Location: Left arm) 74 98.3 °F (36.8 °C) 18 205 lb (93 kg) 98% BMI OB Status Smoking Status 28.59 kg/m2 Having regular periods Current Some Day Smoker BMI and BSA Data Body Mass Index Body Surface Area 28.59 kg/m 2 2.16 m 2 Preferred Pharmacy Pharmacy Name Phone [facility] [address] [phone] Your Updated Medication List  
  
   
This list is accurate as of: 8/16/17  4:28 PM.  Always use your most recent med list.  
  
  
  
  
 ACIDOPHILUS 500 million cell Tab Generic drug:  Lactobacillus acidophilus Take  by mouth daily. albuterol 90 mcg/actuation inhaler Commonly known as:  PROVENTIL HFA, VENTOLIN HFA, PROAIR HFA Take 2 Puffs by inhalation every four (4) hours as needed for Wheezing. allopurinol 100 mg tablet Commonly known as:  Troy Riedel Take 1 Tab by mouth daily. carvedilol 25 mg tablet Commonly known as:  Pink Parrot Take 1 Tab by mouth two (2) times a day. cholecalciferol (VITAMIN D3) 5,000 unit Tab tablet Commonly known as:  VITAMIN D3 Take  by mouth daily. cloNIDine HCl 0.1 mg tablet Commonly known as:  CATAPRES  
TAKE ONE TABLET BY MOUTH TWICE DAILY  
  
 cyanocobalamin 2,000 mcg Tber Take  by mouth daily. glucose blood VI test strips strip Commonly known as:  RELION PRIME TEST STRIPS Use as directed * HYDROmorphone 8 mg Tb24 Commonly known asMadelin Alley ER Take 8 mg by mouth daily. Max Daily Amount: 8 mg. For chronic pain (due to fill 7/2/16) replaces opana ER  
  
 * HYDROmorphone ER 12 mg tablet Commonly known asMadelin Alley ER Take 1 Tab by mouth daily. Max Daily Amount: 12 mg. Due to fill 4/28/17 For chronic pain  
  
 * HYDROmorphone ER 12 mg tablet Commonly known asMadelin Alley ER Take 1 Tab by mouth daily. Max Daily Amount: 12 mg. For chronic pain Start taking on:  8/23/2017  
  
 * HYDROmorphone ER 12 mg tablet Commonly known asMadelin Alley ER Take 1 Tab by mouth daily. Max Daily Amount: 12 mg. For chronic pain Start taking on:  9/22/2017  
  
 ibuprofen 600 mg tablet Commonly known as:  MOTRIN Take 1 Tab by mouth every six (6) hours as needed for Pain.  
  
 lovastatin 20 mg tablet Commonly known as:  MEVACOR Take 1 Tab by mouth nightly. metFORMIN 500 mg tablet Commonly known as:  GLUCOPHAGE Take 1 Tab by mouth two (2) times daily (with meals). multivitamin tablet Commonly known as:  ONE A DAY Take 1 tablet by mouth daily. naloxone 4 mg/actuation Spry 4 mg by Nasal route as needed for up to 2 doses. Indications: OPIOID TOXICITY  
  
 * nortriptyline 50 mg capsule Commonly known as:  PAMELOR Take 1 Cap by mouth nightly. * nortriptyline 75 mg capsule Commonly known as:  PAMELOR Take 1 Cap by mouth daily. ondansetron hcl 8 mg tablet Commonly known as:  ZOFRAN (AS HYDROCHLORIDE) Take 1 Tab by mouth every eight (8) hours as needed for Nausea. OTHER Motorized Wheelchair  
  
 oxybutynin 5 mg tablet Commonly known as:  IILKSWMF Take 1 Tab by mouth two (2) times a day. * oxyCODONE IR 15 mg immediate release tablet Commonly known as:  Sonny Allegra Take 1 Tab by mouth four (4) times daily as needed for Pain for up to 30 days. Max Daily Amount: 60 mg. Prn severe pain -    chronic  Indications: Pain Start taking on:  9/2/2017 * oxyCODONE IR 15 mg immediate release tablet Commonly known as:  Jitendra Fisher Take 1 Tab by mouth four (4) times daily as needed for Pain for up to 30 days. Max Daily Amount: 60 mg. Prn severe pain -    chronic  Indications: Pain Start taking on:  10/1/2017  
  
 oxyMORphone 20 mg ER tablet Commonly known as:  OPANA ER Take 20 mg by mouth every twelve (12) hours. spironolactone 25 mg tablet Commonly known as:  ALDACTONE Take 2 Tabs by mouth daily. VITAMIN C 250 mg tablet Generic drug:  ascorbic acid (vitamin C) Take  by mouth. * Notice: This list has 8 medication(s) that are the same as other medications prescribed for you. Read the directions carefully, and ask your doctor or other care provider to review them with you. Prescriptions Printed Refills HYDROmorphone ER (EXALGO ER) 12 mg tablet 0 Starting on: 8/23/2017 Sig: Take 1 Tab by mouth daily. Max Daily Amount: 12 mg. For chronic pain  
 Class: Print Route: Oral  
 HYDROmorphone ER (EXALGO ER) 12 mg tablet 0 Starting on: 9/22/2017 Sig: Take 1 Tab by mouth daily. Max Daily Amount: 12 mg. For chronic pain  
 Class: Print Route: Oral  
 oxyCODONE IR (ROXICODONE) 15 mg immediate release tablet 0 Starting on: 10/1/2017 Sig: Take 1 Tab by mouth four (4) times daily as needed for Pain for up to 30 days. Max Daily Amount: 60 mg. Prn severe pain -    chronic  Indications: Pain Class: Print Route: Oral  
 oxyCODONE IR (ROXICODONE) 15 mg immediate release tablet 0 Starting on: 9/2/2017 Sig: Take 1 Tab by mouth four (4) times daily as needed for Pain for up to 30 days. Max Daily Amount: 60 mg. Prn severe pain -    chronic  Indications: Pain Class: Print Route: Oral  
  
Follow-up Instructions Return in about 2 months (around 10/16/2017). Patient Instructions 1. Continue current plan with no evidence of addiction or diversion. Stable on current medication without adverse events. 2. Refill hydromorphone ER 12 mg once daily. 3. Refill oxycodone 15 mg. Take half to 1 tablet up to 4 times daily as needed. 4. Add home exercise program.  Exercise/stretching were demonstrated in office today. 5. Naloxone 4 mg nasal spray for opioid induced respiratory depression emergency only. 6. Discussed risks of addiction, dependency, and opioid induced hyperalgesia. 7. Return to clinic in 2 months with Dr. John Lang only Introducing [facility]! [address] introduces [employer] patient portal. Now you can access parts of your medical record, email your doctor's office, and request medication refills online. 1. In your internet browser, go to [email or web address] 2. Click on the First Time User? Click Here link in the Sign In box. You will see the New Member Sign Up page. 3. Enter your [employer] Access Code exactly as it appears below. You will not need to use this code after youve completed the sign-up process. If you do not sign up before the expiration date, you must request a new code. · Coll[employer] Access Code: [email or web address] Expires: 10/11/2017 10:42 AM 
 
4. Enter the last four digits of your Social Security Number (xxxx) and Date of Birth (mm/dd/yyyy) as indicated and click Submit. You will be taken to the next sign-up page. 5. Create a Coll[employer] ID. This will be your [employer] login ID and cannot be changed, so think of one that is secure and easy to remember. 6. Create a [employer] password. You can change your password at any time. 7. Enter your Password Reset Question and Answer. This can be used at a later time if you forget your password. 8. Enter your e-mail address. You will receive e-mail notification when new information is available in [address] 9. Click Sign Up. You can now view and download portions of your medical record. 10. Click the Download Summary menu link to download a portable copy of your medical information. If you have questions, please visit the Frequently Asked Questions section of the [employer] website. Remember, [employer] is NOT to be used for urgent needs. For medical emergencies, dial 911. Now available from your iPhone and Android! Please provide this summary of care documentation to your next provider. Your primary care clinician is listed as [address]. If you have any questions after today's visit, please call [phone].

## 2017-08-16 NOTE — PATIENT INSTRUCTIONS
1. Continue current plan with no evidence of addiction or diversion. Stable on current medication without adverse events. 2. Refill hydromorphone ER 12 mg once daily. 3. Refill oxycodone 15 mg. Take half to 1 tablet up to 4 times daily as needed. 4. Add home exercise program.  Exercise/stretching were demonstrated in office today. 5. Naloxone 4 mg nasal spray for opioid induced respiratory depression emergency only. 6. Discussed risks of addiction, dependency, and opioid induced hyperalgesia.    7. Return to clinic in 2 months with Dr. Lon Gardner only

## 2017-08-16 NOTE — PROGRESS NOTES
HISTORY OF PRESENT ILLNESS  Og Astorga is a 50 y.o. female    HPI: Ms. Andrew Abdul  returns today for f/u of chronic severe pain which is widespread and includes headaches, pain in the cervical spine and intrascapular region, lumbar spine pain radiating down the right posterolateral thigh into the calf and foot. N/o h/o lumbar or neck surgery. Prior lumbar ESIs, injections, PT, and chriropractor with no help. Today is my first visit with her. She continues with pain unchanged since last visit. We discussed her current condition medications in detail today. She reports that during a previous visit with another provider increasing her oxycodone was discussed. She also reports that her last visit had to be rescheduled because her provider left. Her next follow-up visit was past the time that her medications were due. She agrees to self increasing her medication at that time. We had a very lengthy conversation about this today. would we discussed adding home exercise program as well as warm moist heat as well. .  Exercise/stretching were demonstrated in office today. We also had a very lengthy conversation about long-acting versus short-acting medication in the office today. She became very disgruntled during our conversation regarding her medication. I encouraged her to continue with her Exalgo once daily around the same time of day every day and to continue with her oxycodone on an as-needed basis. I advised her that she may take half to 1 tablet as needed. She states that she does take her Exalgo on schedule and does not understand why I am telling her to take half a tablet when her prescriptions has to take 1 tablet. She became very argumentative and talked over me during most of the visit. I tried to explain that I was not reducing her medications in any way during the visit. She stated \"if your trying to reduce my medications, then you need to find me a new doctor. \"  After she was able to calm down I was able to explain to her again that I am only asking her to use her oxycodone on an as-needed basis. She again became upset. I have asked that she follow-up with Dr. Candelario Daivson next visit and the visit was ended. Medications are helping with pain control and quality of life. Her pain is 5-6/10 with medication and 10/10 without. Pt describes pain as constant, burning, and throbbing. Aggravating factors include standing, bending, and walking. Relieved with rest, elevating legs, medication, and avoiding painful activities. Current treatment is helping to improve general activity, mood, walking, sleep, enjoyment of life    In the past 30 days, the patient reports approximately 40-50% pain relief with current treatment/medications. She  is otherwise doing well with no other complaints today. She denies any adverse events including nausea, vomiting, dizziness, increased constipation, hallucinations, or seizures. Because the patient's current regimen places him/her at increased risk for possible overdose, a prescription for naloxone nasal spray has been previously provided. The patient understands that this medication is only to be used in the setting of a possible overdose and that inadvertent use of this medication could precipitate overt withdrawal.         Allergies   Allergen Reactions    Latex Rash    Lisinopril Cough     Ace Inhibitor - cough       Past Surgical History:   Procedure Laterality Date    HX APPENDECTOMY      HX GI      Gallbladder    HX HEART CATHETERIZATION  7/10/2012    HX ORTHOPAEDIC      left arm         Review of Systems   Constitutional: Negative for chills and fever. HENT: Negative for congestion and sore throat. Headaches   Eyes: Negative for blurred vision and double vision. Respiratory: Positive for wheezing ( asthma). Negative for cough and shortness of breath. Cardiovascular: Negative for chest pain and palpitations.    Gastrointestinal: Negative for abdominal pain, constipation, diarrhea, heartburn, nausea and vomiting. Genitourinary: Negative. Musculoskeletal: Positive for back pain, joint pain, myalgias and neck pain. Neurological: Negative for dizziness, seizures and loss of consciousness. Endo/Heme/Allergies: Does not bruise/bleed easily. Psychiatric/Behavioral: Negative for depression. The patient is not nervous/anxious and does not have insomnia. Physical Exam   Constitutional: She is oriented to person, place, and time. She appears distressed ( mild). HENT:   Head: Normocephalic and atraumatic. Pulmonary/Chest: Effort normal.   Neurological: She is alert and oriented to person, place, and time. She displays abnormal speech. Gait ( using a cane) abnormal.   Skin: Skin is warm and dry. No rash noted. She is not diaphoretic. No erythema. Psychiatric: Mood, memory and judgment normal. She is agitated. Nursing note and vitals reviewed. ASSESSMENT:    1. Chronic pain due to trauma    2. Cervicalgia    3. Degeneration of cervical intervertebral disc    4. Degeneration of lumbar or lumbosacral intervertebral disc    5. Chronic pain syndrome    6. Chronic midline low back pain, with sciatica presence unspecified           [place] Prescription Monitoring Program was reviewed which does not demonstrate aberrancies and/or inconsistencies with regard to the historical prescribing of controlled medications to this patient by other providers. PLAN / Pt Instructions:  1. Continue current plan with no evidence of addiction or diversion. Stable on current medication without adverse events. 2. Refill hydromorphone ER 12 mg once daily. 3. Refill oxycodone 15 mg. Take half to 1 tablet up to 4 times daily as needed. 4. Add home exercise program.  Exercise/stretching were demonstrated in office today. 5. Naloxone 4 mg nasal spray for opioid induced respiratory depression emergency only.     6. Discussed risks of addiction, dependency, and opioid induced hyperalgesia. 7. Return to clinic in 2 months with Dr. Mikey Glynn only    Medications Ordered Today   Medications    DISCONTD: HYDROmorphone ER (EXALGO ER) 12 mg tablet     Sig: Take 1 Tab by mouth daily. Max Daily Amount: 12 mg. For chronic pain     Dispense:  30 Tab     Refill:  0    DISCONTD: HYDROmorphone ER (EXALGO ER) 12 mg tablet     Sig: Take 1 Tab by mouth daily. Max Daily Amount: 12 mg. For chronic pain     Dispense:  30 Tab     Refill:  0    DISCONTD: oxyCODONE IR (ROXICODONE) 15 mg immediate release tablet     Sig: Take 1 Tab by mouth four (4) times daily for 30 days. Max Daily Amount: 60 mg. Prn severe pain -    chronic  Indications: Pain     Dispense:  120 Tab     Refill:  0    DISCONTD: oxyCODONE IR (ROXICODONE) 15 mg immediate release tablet     Sig: Take 1 Tab by mouth four (4) times daily for 30 days. Max Daily Amount: 60 mg. Prn severe pain -    chronic  Indications: Pain     Dispense:  120 Tab     Refill:  0    DISCONTD: oxyCODONE IR (ROXICODONE) 15 mg immediate release tablet     Sig: Take 1 Tab by mouth four (4) times daily as needed for Pain for up to 30 days. Max Daily Amount: 60 mg. Prn severe pain -    chronic  Indications: Pain     Dispense:  120 Tab     Refill:  0    oxyCODONE IR (ROXICODONE) 15 mg immediate release tablet     Sig: Take 1 Tab by mouth four (4) times daily as needed for Pain for up to 30 days. Max Daily Amount: 60 mg. Prn severe pain -    chronic  Indications: Pain     Dispense:  120 Tab     Refill:  0       DISPOSITION   Pain medications are prescribed with the objective of pain relief and improved physical and psychosocial function in this patient.  Counseled patient on proper use of prescribed medications.  Counseled patient about chronic medical conditions and their relationship to anxiety and depression and recommended mental health support as needed.    Reviewed with patient self-help tools, home exercise, and lifestyle changes to assist the patient in self-management of symptoms.  Reviewed with patient the treatment plan, goals of treatment plan, and limitations of treatment plan, to include the potential for side effects from medications and procedures. If side effects occur, it is the responsibility of the patient to inform the clinic so that a change in the treatment plan can be made in a safe manner. The patient is advised that stopping prescribed medication may cause an increase in symptoms and possible medication withdrawal symptoms. The patient is informed an emergency room evaluation may be necessary if this occurs. Spent 40 minutes with patient today which more than 50% of that time was spent on counseling and coordination of care. Brigido Brambila 8/16/2017        Note: Please excuse any typographical errors. Voice recognition software was used for this note and may cause mistakes.

## 2017-08-18 ENCOUNTER — OFFICE VISIT (OUTPATIENT)
Dept: OBGYN CLINIC | Age: 52
End: 2017-08-18

## 2017-08-18 VITALS
DIASTOLIC BLOOD PRESSURE: 80 MMHG | WEIGHT: 205 LBS | HEIGHT: 71 IN | BODY MASS INDEX: 28.7 KG/M2 | HEART RATE: 78 BPM | SYSTOLIC BLOOD PRESSURE: 118 MMHG

## 2017-08-18 DIAGNOSIS — N84.0 UTERINE POLYP: Primary | ICD-10-CM

## 2017-08-18 DIAGNOSIS — G89.21 CHRONIC PAIN DUE TO TRAUMA: ICD-10-CM

## 2017-08-18 DIAGNOSIS — N83.202 OVARIAN CYST, BILATERAL: ICD-10-CM

## 2017-08-18 DIAGNOSIS — N94.6 DYSMENORRHEA: ICD-10-CM

## 2017-08-18 DIAGNOSIS — N92.0 MENORRHAGIA WITH REGULAR CYCLE: ICD-10-CM

## 2017-08-18 DIAGNOSIS — N83.201 OVARIAN CYST, BILATERAL: ICD-10-CM

## 2017-08-18 NOTE — PROGRESS NOTES
Name: Jose Guadalupe Fisher MRN: [id number]    YOB: 1965  Age: 54 y.o. Sex: female        Chief Complaint   Patient presents with    Follow-up       HPI     1. Dysmenorrhea  Iburprofen somewhat helpful, pt interested in pregnancy     - US TRANSVAGINAL see below  - CBC W/O 13.3  - TSH 1.24     2. Menorrhagia with regular cycle  Polyp noted on her US, STI testing neg     3. Chronic pain due to trauma  On narcotics and nortriptyline, seeing pain management    4. Abnormal pap  Pt states that she got conflicting reports on her pap from her previous provider, records requested but not obtained, will request again      OB History      Para Term  AB Living    2 1 1  1 1    SAB TAB Ectopic Molar Multiple Live Births    1             Obstetric Comments        Periods regular, last 7-8 days, flow heavy, severe dysmenorrhea  History of sexually transmitted infections never  Last pap 2017           PGyn    History   Sexual Activity    Sexual activity: Yes    Partners: Male    Birth control/ protection: None         Past Medical History:   Diagnosis Date    Abnormal nuclear cardiac imaging test 07/10/2012    Mod mid to distal anterior septal ischemia. EF 47%. Mid to distal anterior septal hypk. Normal EKG portion of stress test.  Intermediate high risk.  Asthma     Back pain     injury at work 2014    Coronary atherosclerosis of native coronary artery     angiographically normal coronaries with dLAD bridging (2012)    Diabetes mellitus type II, controlled ([facility] [address]     Forgetfulness     since injury 2014    Fracture of left humerus     GERD (gastroesophageal reflux disease)     Gout     Headache     Hypertension     Lack of coordination     since injury 2014    Neck pain     injury at work 2014    Obesity     S/P cardiac cath 07/10/2012    dLAD w/mild to mod bridging. Otherwise patent coronaries. At least mod LVH. EF 65%.          Past Surgical History: Procedure Laterality Date    HX APPENDECTOMY      HX GI      Gallbladder    HX HEART CATHETERIZATION  7/10/2012    HX ORTHOPAEDIC      left arm       Allergies   Allergen Reactions    Latex Rash    Lisinopril Cough     Ace Inhibitor - cough       Current Outpatient Prescriptions on File Prior to Visit   Medication Sig Dispense Refill    [START ON 8/23/2017] HYDROmorphone ER (EXALGO ER) 12 mg tablet Take 1 Tab by mouth daily. Max Daily Amount: 12 mg. For chronic pain 30 Tab 0    [START ON 9/22/2017] HYDROmorphone ER (EXALGO ER) 12 mg tablet Take 1 Tab by mouth daily. Max Daily Amount: 12 mg. For chronic pain 30 Tab 0    [START ON 10/1/2017] oxyCODONE IR (ROXICODONE) 15 mg immediate release tablet Take 1 Tab by mouth four (4) times daily as needed for Pain for up to 30 days. Max Daily Amount: 60 mg. Prn severe pain -    chronic  Indications: Pain 120 Tab 0    [START ON 9/2/2017] oxyCODONE IR (ROXICODONE) 15 mg immediate release tablet Take 1 Tab by mouth four (4) times daily as needed for Pain for up to 30 days. Max Daily Amount: 60 mg. Prn severe pain -    chronic  Indications: Pain 120 Tab 0    ibuprofen (MOTRIN) 600 mg tablet Take 1 Tab by mouth every six (6) hours as needed for Pain. 60 Tab 3    lovastatin (MEVACOR) 20 mg tablet Take 1 Tab by mouth nightly. 30 Tab 5    naloxone 4 mg/actuation spry 4 mg by Nasal route as needed for up to 2 doses. Indications: OPIOID TOXICITY 1 Box 1    metFORMIN (GLUCOPHAGE) 500 mg tablet Take 1 Tab by mouth two (2) times daily (with meals). 60 Tab 3    oxybutynin (DITROPAN) 5 mg tablet Take 1 Tab by mouth two (2) times a day. 60 Tab 3    ondansetron hcl (ZOFRAN, AS HYDROCHLORIDE,) 8 mg tablet Take 1 Tab by mouth every eight (8) hours as needed for Nausea. 12 Tab 1    HYDROmorphone ER (EXALGO ER) 12 mg tablet Take 1 Tab by mouth daily. Max Daily Amount: 12 mg.  Due to fill 4/28/17 For chronic pain 30 Tab 0    spironolactone (ALDACTONE) 25 mg tablet Take 2 Tabs by mouth daily. 60 Tab 3    nortriptyline (PAMELOR) 50 mg capsule Take 1 Cap by mouth nightly. 30 Cap 5    nortriptyline (PAMELOR) 75 mg capsule Take 1 Cap by mouth daily. 60 Cap 5    allopurinol (ZYLOPRIM) 100 mg tablet Take 1 Tab by mouth daily. 30 Tab 5    carvedilol (COREG) 25 mg tablet Take 1 Tab by mouth two (2) times a day. 60 Tab 5    cloNIDine HCl (CATAPRES) 0.1 mg tablet TAKE ONE TABLET BY MOUTH TWICE DAILY 60 Tab 3    glucose blood VI test strips (RELION PRIME TEST STRIPS) strip Use as directed 50 Strip 2    albuterol (PROVENTIL HFA, VENTOLIN HFA, PROAIR HFA) 90 mcg/actuation inhaler Take 2 Puffs by inhalation every four (4) hours as needed for Wheezing. 1 Inhaler 0    HYDROmorphone (EXALGO ER) 8 mg Tb24 Take 8 mg by mouth daily. Max Daily Amount: 8 mg. For chronic pain (due to fill 7/2/16) replaces opana ER 30 Tab 0    OxyMORPhone 20 mg PO ER tablet Take 20 mg by mouth every twelve (12) hours.  OTHER Motorized Wheelchair 1 Device prn    multivitamin (ONE A DAY) tablet Take 1 tablet by mouth daily.  ascorbic acid (VITAMIN C) 250 mg tablet Take  by mouth.  Cholecalciferol, Vitamin D3, 5,000 unit Tab Take  by mouth daily.  cyanocobalamin 2,000 mcg TbER Take  by mouth daily.  lactobacillus acidophilus (ACIDOPHILUS) 500 million cell Tab Take  by mouth daily. No current facility-administered medications on file prior to visit. Review of Systems   Constitutional: Negative. Gastrointestinal: Negative. Genitourinary: Negative. Visit Vitals    /80 (BP 1 Location: Left arm, BP Patient Position: Sitting)    Pulse 78    Ht 5' 11\" (1.803 m)    Wt 205 lb (93 kg)    BMI 28.59 kg/m2       GENERAL:  Well developed, well nourished, in no distress        No results found for this or any previous visit (from the past 24 hour(s)). US  Findings:      Transabdominal imaging was initially performed.   Transvaginal imaging was then performed for better visualization of the adnexa  and ovaries.     The uterus is heterogeneous and measures 8.1 x 4.1 x 5.5 cm. Incidental note is  made of nabothian cyst in the cervix. Uterus demonstrates multiple fibroids: 1.3 x 1.1 x 1.2 cm in the anterior  fundus; 1.2 x 1.4 x 1.3 cm in the fundus; 0.8 x 0.6 x 0.5 cm in the posterior  body; and 0.9 x 0.6 x 0.9 cm in the posterior body. Endometrial thickness measures 13.4 mm. There is a hyperechoic 0.4 x 0.4 x 0.4  cm mass in the endometrial cavity. This could represent a small polyp, however  carcinoma cannot be excluded.     The right ovary measures 5 x 3.8 x 2.4 cm. The left ovary measures 7.6 x 3.5 x 3.4 cm. Left ovary demonstrates a 2.9 cm cyst. Right ovary demonstrates a 2.4 cm cyst..  Color and spectral doppler flow analysis was performed. Normal flow was present     No adnexal mass identified. There is mild free fluid in the cul-de-sac.        IMPRESSION  Impression:     Heterogeneous uterus demonstrates multiple fibroids.     Small hyperechoic nodule noted within the endometrial cavity as above. 1. Uterine polyp  Reviewed US with patient and discussed possible etiologies. Reviewed need for removal and offered hysteroscopy with D&C to remove polyp. She would also be interested in chromotubation. Reviewed at her age, I do not believe this would be useful or necessary. Pt unhappy with this, states that she is interested to know if her tubes are open. Discussed getting a second opinion may be beneficial for her. 2. Dysmenorrhea  Doing better with ibuprofen    3. Menorrhagia with regular cycle  Reviewed that I would do a D&C at the time of her hysteroscopy    4. Chronic pain due to trauma  On narcotics and nortriptyline, seeing pain management    5.  Enlarged ovaries  Will call radiology to recheck about the size of her ovaries    Pt very concerned about her pap, notes that she brought letters mailed to her from [facility], thinks that I may copies which I do not see under media, do see where we requested her records for her pap report. Also interested in getting pregnant, offered referral to [facility] which pt declined.       F/U 2 weeks    Face to face time 25 mins with greater than 50% counseling

## 2017-09-01 ENCOUNTER — DOCUMENTATION ONLY (OUTPATIENT)
Dept: OBGYN | Age: 52
End: 2017-09-01

## 2017-09-01 NOTE — PROGRESS NOTES
Reviewed US with Dr. Iesha Chu  Notes that L ovary 4.5 x 1.7 x 2.5   Has attached 2.5 x 2.5 simple cyst  Exophytic

## 2017-09-22 ENCOUNTER — DOCUMENTATION ONLY (OUTPATIENT)
Dept: OBGYN CLINIC | Age: 52
End: 2017-09-22

## 2017-09-25 DIAGNOSIS — E11.69 CONTROLLED TYPE 2 DIABETES MELLITUS WITH OTHER SPECIFIED COMPLICATION, WITHOUT LONG-TERM CURRENT USE OF INSULIN (HCC): ICD-10-CM

## 2017-09-25 NOTE — TELEPHONE ENCOUNTER
Incoming fax from [facility] requesting a refill of   Requested Prescriptions     Pending Prescriptions Disp Refills    metFORMIN (GLUCOPHAGE) 500 mg tablet 60 Tab 3     Sig: Take 1 Tab by mouth two (2) times daily (with meals). Requesting a 90 days supply.

## 2017-09-26 RX ORDER — METFORMIN HYDROCHLORIDE 500 MG/1
500 TABLET ORAL 2 TIMES DAILY WITH MEALS
Qty: 60 TAB | Refills: 3 | Status: SHIPPED | OUTPATIENT
Start: 2017-09-26 | End: 2017-12-04 | Stop reason: SDUPTHER

## 2017-10-12 RX ORDER — HYDROMORPHONE HYDROCHLORIDE 12 MG/1
12 TABLET, EXTENDED RELEASE ORAL DAILY
Qty: 30 TAB | Refills: 0 | Status: SHIPPED | OUTPATIENT
Start: 2017-10-19 | End: 2017-12-06 | Stop reason: SDUPTHER

## 2017-10-12 RX ORDER — OXYCODONE HYDROCHLORIDE 15 MG/1
15 TABLET ORAL
Qty: 120 TAB | Refills: 0 | Status: SHIPPED | OUTPATIENT
Start: 2017-10-31 | End: 2017-12-06 | Stop reason: SDUPTHER

## 2017-10-12 NOTE — TELEPHONE ENCOUNTER
The pt called the office to check on the status of her prescriptions. Her appt with Dr. Stefania Thompson was cancelled because he is no longer with the practice. She will be looking for another provider but will be out of her meds by the end of the month. A  was obtained and there were no aberrancies noted. She last filled her exalgo ER on 09/20/17 and percocet 10/02/17. She is tolerating her medication well. Due to appt availabilities, the reviewing provider states that the pt can have a month of medications. She will be called when her prescriptions are ready. She is not due for a UDS at this time.

## 2017-10-13 ENCOUNTER — TELEPHONE (OUTPATIENT)
Dept: PAIN MANAGEMENT | Age: 52
End: 2017-10-13

## 2017-10-13 NOTE — TELEPHONE ENCOUNTER
The pt was called to be made aware that her prescriptions are ready for  at the office. She was not able to be reached. A message was left for the pt and the number to the nurse triage line was provided.

## 2017-10-19 DIAGNOSIS — R60.9 EDEMA, UNSPECIFIED TYPE: ICD-10-CM

## 2017-10-19 NOTE — TELEPHONE ENCOUNTER
Patient requests spironolactone, clonidine, and carvedilol refills. Pt has insurance now and has an appt with a new PCP on 10/24/17. Reports that her pain management doctor has been refilling her BP meds since we saw her last. Pt states she needs her BP meds now. Request routed to provider.

## 2017-10-22 RX ORDER — CLONIDINE HYDROCHLORIDE 0.1 MG/1
TABLET ORAL
Qty: 60 TAB | Refills: 0 | Status: SHIPPED | OUTPATIENT
Start: 2017-10-22 | End: 2017-12-01 | Stop reason: SDUPTHER

## 2017-10-22 RX ORDER — SPIRONOLACTONE 25 MG/1
50 TABLET ORAL DAILY
Qty: 60 TAB | Refills: 0 | Status: SHIPPED | OUTPATIENT
Start: 2017-10-22 | End: 2017-12-01 | Stop reason: SDUPTHER

## 2017-10-22 RX ORDER — CARVEDILOL 25 MG/1
25 TABLET ORAL 2 TIMES DAILY
Qty: 60 TAB | Refills: 0 | Status: SHIPPED | OUTPATIENT
Start: 2017-10-22 | End: 2017-12-01 | Stop reason: SDUPTHER

## 2017-12-01 DIAGNOSIS — R60.9 EDEMA, UNSPECIFIED TYPE: ICD-10-CM

## 2017-12-04 DIAGNOSIS — E11.69 CONTROLLED TYPE 2 DIABETES MELLITUS WITH OTHER SPECIFIED COMPLICATION, WITHOUT LONG-TERM CURRENT USE OF INSULIN (HCC): ICD-10-CM

## 2017-12-04 DIAGNOSIS — R60.9 EDEMA, UNSPECIFIED TYPE: ICD-10-CM

## 2017-12-04 RX ORDER — CARVEDILOL 25 MG/1
TABLET ORAL
Qty: 60 TAB | Refills: 1 | Status: SHIPPED | OUTPATIENT
Start: 2017-12-04 | End: 2017-12-22 | Stop reason: SDUPTHER

## 2017-12-04 RX ORDER — SPIRONOLACTONE 25 MG/1
TABLET ORAL
Qty: 60 TAB | Refills: 0 | Status: CANCELLED | OUTPATIENT
Start: 2017-12-04

## 2017-12-04 RX ORDER — CARVEDILOL 25 MG/1
TABLET ORAL
Qty: 60 TAB | Refills: 1 | Status: CANCELLED | OUTPATIENT
Start: 2017-12-04

## 2017-12-04 RX ORDER — SPIRONOLACTONE 25 MG/1
TABLET ORAL
Qty: 60 TAB | Refills: 0 | Status: SHIPPED | OUTPATIENT
Start: 2017-12-04 | End: 2017-12-22 | Stop reason: SDUPTHER

## 2017-12-04 RX ORDER — METFORMIN HYDROCHLORIDE 500 MG/1
500 TABLET ORAL 2 TIMES DAILY WITH MEALS
Qty: 60 TAB | Refills: 3 | Status: CANCELLED | OUTPATIENT
Start: 2017-12-04

## 2017-12-04 RX ORDER — CLONIDINE HYDROCHLORIDE 0.1 MG/1
TABLET ORAL
Qty: 60 TAB | Refills: 1 | Status: SHIPPED | OUTPATIENT
Start: 2017-12-04 | End: 2017-12-22 | Stop reason: SDUPTHER

## 2017-12-04 RX ORDER — CLONIDINE HYDROCHLORIDE 0.1 MG/1
TABLET ORAL
Qty: 60 TAB | Refills: 1 | Status: CANCELLED | OUTPATIENT
Start: 2017-12-04

## 2017-12-04 NOTE — TELEPHONE ENCOUNTER
Call made to the pt using two identifiers, name and . Noted her refill request. Pt last seen in this office on 16. Pt has been seeing the [address] [place] [address]. Noted that they refilled all her requested medications other than the Metformin. She was advised to call them to see if they could refill that as well because she has not been seen in over a year Pt has an appointment with us on 17. She verbalized understanding.

## 2017-12-04 NOTE — TELEPHONE ENCOUNTER
Patient called to request metformin refill. Pt has insurance now and has new PCP. Has appt with [facility] on 12/22/17. Request routed to provider.

## 2017-12-04 NOTE — TELEPHONE ENCOUNTER
Requested Prescriptions     Pending Prescriptions Disp Refills    cloNIDine HCl (CATAPRES) 0.1 mg tablet 60 Tab 1    carvedilol (COREG) 25 mg tablet 60 Tab 1    spironolactone (ALDACTONE) 25 mg tablet 60 Tab 0    metFORMIN (GLUCOPHAGE) 500 mg tablet 60 Tab 3     Sig: Take 1 Tab by mouth two (2) times daily (with meals).      Patient has an appt on 12/22/2017

## 2017-12-05 RX ORDER — METFORMIN HYDROCHLORIDE 500 MG/1
500 TABLET ORAL 2 TIMES DAILY WITH MEALS
Qty: 60 TAB | Refills: 0 | Status: SHIPPED | OUTPATIENT
Start: 2017-12-05 | End: 2017-12-15 | Stop reason: SDUPTHER

## 2017-12-06 ENCOUNTER — OFFICE VISIT (OUTPATIENT)
Dept: PAIN MANAGEMENT | Age: 52
End: 2017-12-06

## 2017-12-06 VITALS
WEIGHT: 205 LBS | HEIGHT: 71 IN | BODY MASS INDEX: 28.7 KG/M2 | SYSTOLIC BLOOD PRESSURE: 127 MMHG | RESPIRATION RATE: 18 BRPM | HEART RATE: 88 BPM | DIASTOLIC BLOOD PRESSURE: 89 MMHG | TEMPERATURE: 97.3 F

## 2017-12-06 DIAGNOSIS — M50.30 DEGENERATION OF CERVICAL INTERVERTEBRAL DISC: ICD-10-CM

## 2017-12-06 DIAGNOSIS — M54.5 CHRONIC MIDLINE LOW BACK PAIN, WITH SCIATICA PRESENCE UNSPECIFIED: ICD-10-CM

## 2017-12-06 DIAGNOSIS — G89.29 CHRONIC MIDLINE LOW BACK PAIN, WITH SCIATICA PRESENCE UNSPECIFIED: ICD-10-CM

## 2017-12-06 DIAGNOSIS — M51.37 DEGENERATION OF LUMBAR OR LUMBOSACRAL INTERVERTEBRAL DISC: ICD-10-CM

## 2017-12-06 DIAGNOSIS — G89.4 CHRONIC PAIN SYNDROME: ICD-10-CM

## 2017-12-06 DIAGNOSIS — G89.21 CHRONIC PAIN DUE TO TRAUMA: ICD-10-CM

## 2017-12-06 RX ORDER — HYDROMORPHONE HYDROCHLORIDE 12 MG/1
12 TABLET, EXTENDED RELEASE ORAL DAILY
Qty: 30 TAB | Refills: 0 | Status: SHIPPED | OUTPATIENT
Start: 2018-01-19 | End: 2018-01-31

## 2017-12-06 RX ORDER — OXYCODONE HYDROCHLORIDE 15 MG/1
15 TABLET ORAL
Qty: 90 TAB | Refills: 0 | Status: SHIPPED | OUTPATIENT
Start: 2017-12-06 | End: 2017-12-21

## 2017-12-06 RX ORDER — OXYCODONE HYDROCHLORIDE 15 MG/1
15 TABLET ORAL
Qty: 120 TAB | Refills: 0 | Status: SHIPPED | OUTPATIENT
Start: 2018-01-19 | End: 2018-01-31

## 2017-12-06 RX ORDER — HYDROMORPHONE HYDROCHLORIDE 12 MG/1
12 TABLET, EXTENDED RELEASE ORAL DAILY
Qty: 30 TAB | Refills: 0 | Status: SHIPPED | OUTPATIENT
Start: 2017-12-06 | End: 2018-01-31

## 2017-12-06 RX ORDER — OXYCODONE HYDROCHLORIDE 15 MG/1
15 TABLET ORAL 4 TIMES DAILY
Qty: 120 TAB | Refills: 0 | Status: SHIPPED | OUTPATIENT
Start: 2017-12-20 | End: 2018-01-31

## 2017-12-06 RX ORDER — HYDROMORPHONE HYDROCHLORIDE 8 MG/1
8 TABLET, EXTENDED RELEASE ORAL DAILY
Qty: 15 TAB | Refills: 0 | Status: SHIPPED | OUTPATIENT
Start: 2017-12-06 | End: 2017-12-21

## 2017-12-06 RX ORDER — HYDROMORPHONE HYDROCHLORIDE 12 MG/1
12 TABLET, EXTENDED RELEASE ORAL DAILY
Qty: 30 TAB | Refills: 0 | Status: SHIPPED | OUTPATIENT
Start: 2017-12-20 | End: 2018-01-31

## 2017-12-06 NOTE — PROGRESS NOTES
HISTORY OF PRESENT ILLNESS  Madhuri Malcolm is a 50 y.o. female    HPI: Ms. Wild Sinclair  returns today for f/u of chronic severe pain which is widespread and includes headaches, pain in the cervical spine and intrascapular region, lumbar spine pain radiating down the right posterolateral thigh into the calf and foot. N/o h/o lumbar or neck surgery. Prior lumbar ESIs, injections, PT, and chriropractor with no help. As I entered the room, Ms. Wild Sinclair presented with a great deal of distress. She was very emotional and crying. She stated that she has been out of her medication and is in severe pain. She reports that she has been out of her Hydromorphone ER since 11/19 and her Oxycodone since 11/30. I began to explain to her a tapering plan to restart her medications. She then stated \"no, you are that luis. You are that luis that tried to cut my medications last visit. \"  She became very upset and began to raise her voice at me. I attempted to calm her down try to explain her that because of the time she has been out of her medication I would have to taper her medications back to her normal dose. She would not listen and kept talking over me. She became so disruptive that we had to relocate other patients to other rooms. She then stated that I needed to bring him a different doctor or she would talk to the practice manager. I then asked the practice manager to intervene and speak with Ms. Wild Sinclair. After a very lengthy conversation with our practice manager I was told that she would listen to what I had to say. I then returned to the room and explained to her we needed to taper her medications back up to her normal dose due to the time she has been out of her medication. I spoke to Dr. Aminata Palomo who agreed with a quick tapering plan to get her back to the regimen she was at before. She finally agreed to this plan.   Apparently she was advised that she would follow-up with a different provider moving forward as she still thinks that I was trying to cut her medications during her previous visit. Please see last office visit notes, 8/16/2017. Medications are helping with pain control and quality of life. Her pain is 5-6/10 with medication and 10/10 without. Pt describes pain as constant, burning, and throbbing. Aggravating factors include standing, bending, and walking. Relieved with rest, elevating legs, medication, and avoiding painful activities. Current treatment is helping to improve general activity, mood, walking, sleep, enjoyment of life    In the past 30 days, the patient reports approximately 40-50% pain relief with current treatment/medications. She  is otherwise doing well with no other complaints today. She denies any adverse events including nausea, vomiting, dizziness, increased constipation, hallucinations, or seizures. Because the patient's current regimen places him/her at increased risk for possible overdose, a prescription for naloxone nasal spray has been previously provided. The patient understands that this medication is only to be used in the setting of a possible overdose and that inadvertent use of this medication could precipitate overt withdrawal.         Allergies   Allergen Reactions    Latex Rash    Lisinopril Cough     Ace Inhibitor - cough       Past Surgical History:   Procedure Laterality Date    HX APPENDECTOMY      HX GI      Gallbladder    HX HEART CATHETERIZATION  7/10/2012    HX ORTHOPAEDIC      left arm         Review of Systems   Constitutional: Negative for chills and fever. HENT: Negative for congestion and sore throat. Headaches   Eyes: Negative for blurred vision and double vision. Respiratory: Positive for wheezing ( asthma). Negative for cough and shortness of breath. Cardiovascular: Negative for chest pain and palpitations. Gastrointestinal: Negative for abdominal pain, constipation, diarrhea, heartburn, nausea and vomiting. Genitourinary: Negative. Musculoskeletal: Positive for back pain, joint pain, myalgias and neck pain. Neurological: Negative for dizziness, seizures and loss of consciousness. Endo/Heme/Allergies: Does not bruise/bleed easily. Psychiatric/Behavioral: Negative for depression. The patient is not nervous/anxious and does not have insomnia. Physical Exam   Constitutional: She is oriented to person, place, and time. She appears distressed ( mild). HENT:   Head: Normocephalic and atraumatic. Pulmonary/Chest: Effort normal.   Neurological: She is alert and oriented to person, place, and time. She displays abnormal speech. Gait ( using a cane) abnormal.   Skin: Skin is warm and dry. No rash noted. She is not diaphoretic. No erythema. Psychiatric: Mood, memory and judgment normal. She is agitated. Nursing note and vitals reviewed. ASSESSMENT:    1. Chronic pain due to trauma    2. Degeneration of cervical intervertebral disc    3. Degeneration of lumbar or lumbosacral intervertebral disc    4. Chronic pain syndrome    5. Chronic midline low back pain, with sciatica presence unspecified           [place] Prescription Monitoring Program was reviewed which does not demonstrate aberrancies and/or inconsistencies with regard to the historical prescribing of controlled medications to this patient by other providers. PLAN / Pt Instructions:  1. Continue current plan with no evidence of addiction or diversion. Stable on current medication without adverse events. 2. Refill and adjust hydromorphone ER. Start back at 8 mg once daily ×15 days. Then 12 mg once daily thereafter. 3. Refill and adjust oxycodone. Start oxycodone 15 mg up to 3 times daily as needed ×15 days. Then up to 4 times daily as needed thereafter  4. Continue home exercise program.    5. Naloxone 4 mg nasal spray for opioid induced respiratory depression emergency only. 6. Discussed risks of addiction, dependency, and opioid induced hyperalgesia. 7. Return to clinic in 2 months     Medications Ordered Today   Medications    HYDROmorphone ER (EXALGO ER) 12 mg tablet     Sig: Take 1 Tab by mouth daily. Max Daily Amount: 12 mg. For chronic pain     Dispense:  30 Tab     Refill:  0    HYDROmorphone ER (EXALGO ER) 12 mg tablet     Sig: Take 1 Tab by mouth daily for 30 days. Max Daily Amount: 12 mg. For chronic pain     Dispense:  30 Tab     Refill:  0    HYDROmorphone ER (EXALGO ER) 12 mg tablet     Sig: Take 1 Tab by mouth daily. Max Daily Amount: 12 mg. For chronic pain     Dispense:  30 Tab     Refill:  0    oxyCODONE IR (ROXICODONE) 15 mg immediate release tablet     Sig: Take 1 Tab by mouth three (3) times daily as needed for Pain for up to 15 days. Max Daily Amount: 45 mg. Prn severe pain -    chronic  Indications: Pain     Dispense:  90 Tab     Refill:  0    oxyCODONE IR (ROXICODONE) 15 mg immediate release tablet     Sig: Take 1 Tab by mouth four (4) times daily for 30 days. Max Daily Amount: 60 mg. Prn severe pain -    chronic  Indications: Pain     Dispense:  120 Tab     Refill:  0    oxyCODONE IR (ROXICODONE) 15 mg immediate release tablet     Sig: Take 1 Tab by mouth four (4) times daily as needed for Pain for up to 30 days. Max Daily Amount: 60 mg. Prn severe pain -    chronic  Indications: Pain     Dispense:  120 Tab     Refill:  0    HYDROmorphone (EXALGO) 8 mg Tb24     Sig: Take 8 mg by mouth daily for 15 days. Max Daily Amount: 8 mg. Dispense:  15 Tab     Refill:  0       DISPOSITION   Pain medications are prescribed with the objective of pain relief and improved physical and psychosocial function in this patient.  Counseled patient on proper use of prescribed medications.  Counseled patient about chronic medical conditions and their relationship to anxiety and depression and recommended mental health support as needed.    Reviewed with patient self-help tools, home exercise, and lifestyle changes to assist the patient in self-management of symptoms.  Reviewed with patient the treatment plan, goals of treatment plan, and limitations of treatment plan, to include the potential for side effects from medications and procedures. If side effects occur, it is the responsibility of the patient to inform the clinic so that a change in the treatment plan can be made in a safe manner. The patient is advised that stopping prescribed medication may cause an increase in symptoms and possible medication withdrawal symptoms. The patient is informed an emergency room evaluation may be necessary if this occurs. Spent 50 minutes with patient today which more than 50% of that time was spent on counseling and coordination of care. Brigido Lofton 12/6/2017        Note: Please excuse any typographical errors. Voice recognition software was used for this note and may cause mistakes.

## 2017-12-06 NOTE — PROGRESS NOTES
Nursing Notes    Patient presents to the office today in follow-up. Patient rates her pain at 10/10 on the numerical pain scale. Reviewed medications with counts as follows:    Rx Date filled Qty Dispensed Pill Count Last Dose Short   HYDROMORPHONE 12 MG ER TAB 10/19/17 30 0 11/19/17 NO   OXYCODONE 15 MG (M2) TAB 10/30/17 120 0 11/30/17 NO                                  Comments:     POC UDS was not performed in office today    Any new labs or imaging since last appointment? NO    Have you been to an emergency room (ER) or urgent care clinic since your last visit? NO            Have you been hospitalized since your last visit? NO     If yes, where, when, and reason for visit? Have you seen or consulted any other health care providers outside of the [address] [place] [address]  since your last visit? NO     If yes, where, when, and reason for visit? HM deferred to pcp.

## 2017-12-06 NOTE — MR AVS SNAPSHOT
Visit Information Date & Time Provider Department Dept. Phone Encounter #  
 12/6/2017  2:40 PM PALL[facility] [phone] Your Appointments 12/22/2017  4:00 PM  
FOLLOW UP EXAM with YOVANI Hickey Formerly [facility] ([address]) Appt Note: f/u and needs med refill - former pt of Dr. Georgina Brock [address] [place] [id number]  
[facility] [address] Upcoming Health Maintenance Date Due  
 EYE EXAM RETINAL OR DILATED Q1 9/13/1977 Pneumococcal 19-64 Medium Risk (1 of 1 - PPSV23) 9/13/1986 MICROALBUMIN Q1 8/31/2016 Influenza Age 5 to Adult 8/1/2017 FOBT Q 1 YEAR AGE 50-75 9/13/2017 HEMOGLOBIN A1C Q6M 11/17/2017 FOOT EXAM Q1 5/17/2018 LIPID PANEL Q1 5/17/2018 BREAST CANCER SCRN MAMMOGRAM 3/31/2019 PAP AKA CERVICAL CYTOLOGY 5/17/2020 DTaP/Tdap/Td series (2 - Td) 8/31/2025 Allergies as of 12/6/2017  Review Complete On: 12/6/2017 By: KRAIG [facility] Severity Noted Reaction Type Reactions Latex  08/26/2014    Rash Lisinopril  06/07/2012   Intolerance Cough Ace Inhibitor - cough Current Immunizations  Reviewed on 8/31/2015 Name Date Influenza Vaccine 9/25/2015, 10/30/2013 Tdap 8/31/2015, 5/22/2014  6:52 PM  
  
 Not reviewed this visit You Were Diagnosed With   
  
 Codes Comments Chronic pain due to trauma     ICD-10-CM: G89.21 ICD-9-CM: 338.21 Degeneration of cervical intervertebral disc     ICD-10-CM: M50.30 ICD-9-CM: 722.4 Degeneration of lumbar or lumbosacral intervertebral disc     ICD-10-CM: M51.37 
ICD-9-CM: 722.52 Chronic pain syndrome     ICD-10-CM: G89.4 ICD-9-CM: 338. 4 Chronic midline low back pain, with sciatica presence unspecified     ICD-10-CM: M54.5, G89.29 ICD-9-CM: 724.2, 338.29 Vitals BP Pulse Temp Resp Height(growth percentile) Weight(growth percentile) 127/89 (BP 1 Location: Left arm, BP Patient Position: Sitting) 88 97.3 °F (36.3 °C) (Oral) 18 5' 11\" (1.803 m) 205 lb (93 kg) BMI OB Status Smoking Status 28.59 kg/m2 Having regular periods Former Smoker Vitals History BMI and BSA Data Body Mass Index Body Surface Area 28.59 kg/m 2 2.16 m 2 Preferred Pharmacy Pharmacy Name Phone [facility] [address] [phone] Your Updated Medication List  
  
   
This list is accurate as of: 12/6/17  4:19 PM.  Always use your most recent med list.  
  
  
  
  
 ACIDOPHILUS 500 million cell Tab Generic drug:  Lactobacillus acidophilus Take  by mouth daily. albuterol 90 mcg/actuation inhaler Commonly known as:  PROVENTIL HFA, VENTOLIN HFA, PROAIR HFA Take 2 Puffs by inhalation every four (4) hours as needed for Wheezing. allopurinol 100 mg tablet Commonly known as:  Danika Gosling Take 1 Tab by mouth daily. carvedilol 25 mg tablet Commonly known as:  COREG  
TAKE ONE TABLET BY MOUTH TWICE DAILY  
  
 cholecalciferol (VITAMIN D3) 5,000 unit Tab tablet Commonly known as:  VITAMIN D3 Take  by mouth daily. cloNIDine HCl 0.1 mg tablet Commonly known as:  CATAPRES  
TAKE ONE TABLET BY MOUTH TWICE DAILY  
  
 cyanocobalamin 2,000 mcg Tber Take  by mouth daily. glucose blood VI test strips strip Commonly known as:  RELION PRIME TEST STRIPS Use as directed * HYDROmorphone 8 mg Tb24 Commonly known asKasandra Million ER Take 8 mg by mouth daily. Max Daily Amount: 8 mg. For chronic pain (due to fill 7/2/16) replaces opana ER  
  
 * HYDROmorphone ER 12 mg tablet Commonly known asKasandra Million ER Take 1 Tab by mouth daily. Max Daily Amount: 12 mg. Due to fill 4/28/17 For chronic pain  
  
 * HYDROmorphone ER 12 mg tablet Commonly known asKasandra Million ER  
 Take 1 Tab by mouth daily. Max Daily Amount: 12 mg. For chronic pain  
  
 * HYDROmorphone 8 mg Tb24 Commonly known as:  Rohan Nair Take 8 mg by mouth daily for 15 days. Max Daily Amount: 8 mg.  
  
 * HYDROmorphone ER 12 mg tablet Commonly known asWaunita Cirri ER Take 1 Tab by mouth daily. Max Daily Amount: 12 mg. For chronic pain Start taking on:  12/20/2017  
  
 * HYDROmorphone ER 12 mg tablet Commonly known asWaunita Cirri ER Take 1 Tab by mouth daily for 30 days. Max Daily Amount: 12 mg. For chronic pain Start taking on:  1/19/2018  
  
 ibuprofen 600 mg tablet Commonly known as:  MOTRIN Take 1 Tab by mouth every six (6) hours as needed for Pain.  
  
 lovastatin 20 mg tablet Commonly known as:  MEVACOR Take 1 Tab by mouth nightly. metFORMIN 500 mg tablet Commonly known as:  GLUCOPHAGE Take 1 Tab by mouth two (2) times daily (with meals). multivitamin tablet Commonly known as:  ONE A DAY Take 1 tablet by mouth daily. naloxone 4 mg/actuation nasal spray Commonly known as:  NARCAN  
4 mg by Nasal route as needed for up to 2 doses. Indications: OPIOID TOXICITY  
  
 * nortriptyline 50 mg capsule Commonly known as:  PAMELOR Take 1 Cap by mouth nightly. * nortriptyline 75 mg capsule Commonly known as:  PAMELOR Take 1 Cap by mouth daily. ondansetron hcl 8 mg tablet Commonly known as:  ZOFRAN (AS HYDROCHLORIDE) Take 1 Tab by mouth every eight (8) hours as needed for Nausea. OTHER Motorized Wheelchair  
  
 oxybutynin 5 mg tablet Commonly known as:  CPWQHIPW Take 1 Tab by mouth two (2) times a day. * oxyCODONE IR 15 mg immediate release tablet Commonly known as:  Shreya Scott Take 1 Tab by mouth three (3) times daily as needed for Pain for up to 15 days. Max Daily Amount: 45 mg. Prn severe pain -    chronic  Indications: Pain * oxyCODONE IR 15 mg immediate release tablet Commonly known as:  Cheryln Records  
 Take 1 Tab by mouth four (4) times daily for 30 days. Max Daily Amount: 60 mg. Prn severe pain -    chronic  Indications: Pain Start taking on:  12/20/2017 * oxyCODONE IR 15 mg immediate release tablet Commonly known as:  Wally Camel Take 1 Tab by mouth four (4) times daily as needed for Pain for up to 30 days. Max Daily Amount: 60 mg. Prn severe pain -    chronic  Indications: Pain Start taking on:  1/19/2018  
  
 oxyMORphone 20 mg ER tablet Commonly known as:  OPANA ER Take 20 mg by mouth every twelve (12) hours. spironolactone 25 mg tablet Commonly known as:  ALDACTONE  
TAKE TWO TABLETS BY MOUTH ONCE DAILY  
  
 VITAMIN C 250 mg tablet Generic drug:  ascorbic acid (vitamin C) Take  by mouth. * Notice: This list has 11 medication(s) that are the same as other medications prescribed for you. Read the directions carefully, and ask your doctor or other care provider to review them with you. Prescriptions Printed Refills HYDROmorphone ER (EXALGO ER) 12 mg tablet 0 Starting on: 12/20/2017 Sig: Take 1 Tab by mouth daily. Max Daily Amount: 12 mg. For chronic pain  
 Class: Print Route: Oral  
 HYDROmorphone ER (EXALGO ER) 12 mg tablet 0 Starting on: 1/19/2018 Sig: Take 1 Tab by mouth daily for 30 days. Max Daily Amount: 12 mg. For chronic pain  
 Class: Print Route: Oral  
 HYDROmorphone ER (EXALGO ER) 12 mg tablet 0 Sig: Take 1 Tab by mouth daily. Max Daily Amount: 12 mg. For chronic pain  
 Class: Print Route: Oral  
 oxyCODONE IR (ROXICODONE) 15 mg immediate release tablet 0 Sig: Take 1 Tab by mouth three (3) times daily as needed for Pain for up to 15 days. Max Daily Amount: 45 mg. Prn severe pain -    chronic  Indications: Pain Class: Print Route: Oral  
 oxyCODONE IR (ROXICODONE) 15 mg immediate release tablet 0 Starting on: 12/20/2017 Sig: Take 1 Tab by mouth four (4) times daily for 30 days.  Max Daily Amount: 60 mg. Prn severe pain -    chronic  Indications: Pain Class: Print Route: Oral  
 oxyCODONE IR (ROXICODONE) 15 mg immediate release tablet 0 Starting on: 1/19/2018 Sig: Take 1 Tab by mouth four (4) times daily as needed for Pain for up to 30 days. Max Daily Amount: 60 mg. Prn severe pain -    chronic  Indications: Pain Class: Print Route: Oral  
 HYDROmorphone (EXALGO) 8 mg Tb24 0 Sig: Take 8 mg by mouth daily for 15 days. Max Daily Amount: 8 mg. Class: Print Route: Oral  
  
Introducing [address]! Nuno Castellanos introduces New [facility] patient portal. Now you can access parts of your medical record, email your doctor's office, and request medication refills online. 1. In your internet browser, go to [email or web address] 2. Click on the First Time User? Click Here link in the Sign In box. You will see the New Member Sign Up page. 3. Enter your New [facility] Access Code exactly as it appears below. You will not need to use this code after youve completed the sign-up process. If you do not sign up before the expiration date, you must request a new code. · New [facility] Access Code: [email or web address] Expires: 1/31/2018  6:55 AM 
 
4. Enter the last four digits of your Social Security Number (xxxx) and Date of Birth (mm/dd/yyyy) as indicated and click Submit. You will be taken to the next sign-up page. 5. Create a Skuidt ID. This will be your New [facility] login ID and cannot be changed, so think of one that is secure and easy to remember. 6. Create a New [facility] password. You can change your password at any time. 7. Enter your Password Reset Question and Answer. This can be used at a later time if you forget your password. 8. Enter your e-mail address. You will receive e-mail notification when new information is available in [address] 9. Click Sign Up. You can now view and download portions of your medical record. 10. Click the Download Summary menu link to download a portable copy of your medical information. If you have questions, please visit the Frequently Asked Questions section of the [employer] website. Remember, [facility] is NOT to be used for urgent needs. For medical emergencies, dial 911. Now available from your iPhone and Android! Please provide this summary of care documentation to your next provider. Your primary care clinician is listed as [address]. If you have any questions after today's visit, please call [phone].

## 2017-12-07 NOTE — PATIENT INSTRUCTIONS
1. Continue current plan with no evidence of addiction or diversion. Stable on current medication without adverse events. 2. Refill and adjust hydromorphone ER. Start back at 8 mg once daily ×15 days. Then 12 mg once daily thereafter. 3. Refill and adjust oxycodone. Start oxycodone 15 mg up to 3 times daily as needed ×15 days. Then up to 4 times daily as needed thereafter  4. Continue home exercise program.    5. Naloxone 4 mg nasal spray for opioid induced respiratory depression emergency only. 6. Discussed risks of addiction, dependency, and opioid induced hyperalgesia.    7. Return to clinic in 2 months

## 2017-12-15 DIAGNOSIS — E11.69 CONTROLLED TYPE 2 DIABETES MELLITUS WITH OTHER SPECIFIED COMPLICATION, WITHOUT LONG-TERM CURRENT USE OF INSULIN (HCC): ICD-10-CM

## 2017-12-15 RX ORDER — METFORMIN HYDROCHLORIDE 500 MG/1
500 TABLET ORAL 2 TIMES DAILY WITH MEALS
Qty: 60 TAB | Refills: 0 | Status: SHIPPED | OUTPATIENT
Start: 2017-12-15 | End: 2017-12-22 | Stop reason: DRUGHIGH

## 2017-12-22 ENCOUNTER — OFFICE VISIT (OUTPATIENT)
Dept: FAMILY MEDICINE CLINIC | Age: 52
End: 2017-12-22

## 2017-12-22 VITALS
HEART RATE: 96 BPM | SYSTOLIC BLOOD PRESSURE: 130 MMHG | TEMPERATURE: 98 F | HEIGHT: 71 IN | OXYGEN SATURATION: 100 % | DIASTOLIC BLOOD PRESSURE: 88 MMHG | WEIGHT: 210 LBS | BODY MASS INDEX: 29.4 KG/M2 | RESPIRATION RATE: 18 BRPM

## 2017-12-22 DIAGNOSIS — M10.9 GOUT, UNSPECIFIED CAUSE, UNSPECIFIED CHRONICITY, UNSPECIFIED SITE: ICD-10-CM

## 2017-12-22 DIAGNOSIS — I10 ESSENTIAL HYPERTENSION: Primary | ICD-10-CM

## 2017-12-22 DIAGNOSIS — Z12.11 SCREENING FOR COLON CANCER: ICD-10-CM

## 2017-12-22 DIAGNOSIS — G44.329 CHRONIC POST-TRAUMATIC HEADACHE, NOT INTRACTABLE: ICD-10-CM

## 2017-12-22 DIAGNOSIS — E13.9 DIABETES 1.5, MANAGED AS TYPE 2 (HCC): ICD-10-CM

## 2017-12-22 DIAGNOSIS — R60.9 EDEMA, UNSPECIFIED TYPE: ICD-10-CM

## 2017-12-22 RX ORDER — CARVEDILOL 25 MG/1
TABLET ORAL
Qty: 180 TAB | Refills: 1 | Status: SHIPPED | OUTPATIENT
Start: 2017-12-22 | End: 2018-10-21 | Stop reason: SDUPTHER

## 2017-12-22 RX ORDER — ALLOPURINOL 100 MG/1
100 TABLET ORAL 2 TIMES DAILY
Qty: 180 TAB | Refills: 1 | Status: SHIPPED | OUTPATIENT
Start: 2017-12-22

## 2017-12-22 RX ORDER — CLONIDINE HYDROCHLORIDE 0.1 MG/1
TABLET ORAL
Qty: 180 TAB | Refills: 1 | Status: SHIPPED | OUTPATIENT
Start: 2017-12-22 | End: 2018-08-27 | Stop reason: SDUPTHER

## 2017-12-22 RX ORDER — NORTRIPTYLINE HYDROCHLORIDE 75 MG/1
75 CAPSULE ORAL
Qty: 60 CAP | Refills: 5 | Status: SHIPPED | OUTPATIENT
Start: 2017-12-22 | End: 2018-05-01

## 2017-12-22 RX ORDER — NORTRIPTYLINE HYDROCHLORIDE 50 MG/1
50 CAPSULE ORAL DAILY
Qty: 30 CAP | Refills: 5 | Status: SHIPPED | OUTPATIENT
Start: 2017-12-22 | End: 2018-05-01 | Stop reason: SDUPTHER

## 2017-12-22 RX ORDER — ALLOPURINOL 100 MG/1
100 TABLET ORAL DAILY
Qty: 180 TAB | Refills: 1 | Status: SHIPPED | OUTPATIENT
Start: 2017-12-22 | End: 2017-12-22 | Stop reason: SDUPTHER

## 2017-12-22 RX ORDER — METFORMIN HYDROCHLORIDE 1000 MG/1
1000 TABLET ORAL 2 TIMES DAILY WITH MEALS
Qty: 180 TAB | Refills: 1 | Status: SHIPPED | OUTPATIENT
Start: 2017-12-22

## 2017-12-22 RX ORDER — SPIRONOLACTONE 25 MG/1
TABLET ORAL
Qty: 180 TAB | Refills: 1 | Status: SHIPPED | OUTPATIENT
Start: 2017-12-22 | End: 2018-12-17 | Stop reason: SDUPTHER

## 2017-12-22 NOTE — PROGRESS NOTES
Today's Date:  2017   Patient:  Tali Chan  Patient :  1965    Subjective:     Chief Complaint   Patient presents with    Follow-up    Gout     pt. c/o gout pain in right ankle     Neuropathy      bilateral foot numbness and pain        Tali Chan is a 50 y.o. female who presents for follow up for Chronic disease. BP today is not at goal today >130/80.    States that fasting blood sugars are usually range. Denies hypoglycemic episodes. Medications regimen is as follows: patient is taking medication; states that dose of Metformin was recently decreased to 500 mg twice a day and now her FBS runs in the 200's. She wants dose she used to take. Daily exercise and diet are as follows: Does not regularly exercise. States that her gout is acting up. States that the bone of her right ankle is coming out and her ankle is painful. States that Allopurinol does not work anymore. Is compliant with medication and denies side effects. Last HA1c: 5.9  Last LDL: 82.6  Last urine microalbumin:   Also want refill of all her medication. Current Outpatient Meds and Allergies     Current Outpatient Prescriptions on File Prior to Visit   Medication Sig Dispense Refill    metFORMIN (GLUCOPHAGE) 500 mg tablet Take 1 Tab by mouth two (2) times daily (with meals). 60 Tab 0    HYDROmorphone ER (EXALGO ER) 12 mg tablet Take 1 Tab by mouth daily. Max Daily Amount: 12 mg. For chronic pain 30 Tab 0    [START ON 2018] HYDROmorphone ER (EXALGO ER) 12 mg tablet Take 1 Tab by mouth daily for 30 days. Max Daily Amount: 12 mg. For chronic pain 30 Tab 0    oxyCODONE IR (ROXICODONE) 15 mg immediate release tablet Take 1 Tab by mouth four (4) times daily for 30 days. Max Daily Amount: 60 mg. Prn severe pain -    chronic  Indications: Pain 120 Tab 0    [START ON 2018] oxyCODONE IR (ROXICODONE) 15 mg immediate release tablet Take 1 Tab by mouth four (4) times daily as needed for Pain for up to 30 days. Max Daily Amount: 60 mg. Prn severe pain -    chronic  Indications: Pain 120 Tab 0    cloNIDine HCl (CATAPRES) 0.1 mg tablet TAKE ONE TABLET BY MOUTH TWICE DAILY 60 Tab 1    carvedilol (COREG) 25 mg tablet TAKE ONE TABLET BY MOUTH TWICE DAILY 60 Tab 1    spironolactone (ALDACTONE) 25 mg tablet TAKE TWO TABLETS BY MOUTH ONCE DAILY 60 Tab 0    ibuprofen (MOTRIN) 600 mg tablet Take 1 Tab by mouth every six (6) hours as needed for Pain. 60 Tab 3    lovastatin (MEVACOR) 20 mg tablet Take 1 Tab by mouth nightly. 30 Tab 5    oxybutynin (DITROPAN) 5 mg tablet Take 1 Tab by mouth two (2) times a day. 60 Tab 3    ondansetron hcl (ZOFRAN, AS HYDROCHLORIDE,) 8 mg tablet Take 1 Tab by mouth every eight (8) hours as needed for Nausea. 12 Tab 1    HYDROmorphone ER (EXALGO ER) 12 mg tablet Take 1 Tab by mouth daily. Max Daily Amount: 12 mg. Due to fill 17 For chronic pain 30 Tab 0    nortriptyline (PAMELOR) 50 mg capsule Take 1 Cap by mouth nightly. 30 Cap 5    nortriptyline (PAMELOR) 75 mg capsule Take 1 Cap by mouth daily. 60 Cap 5    allopurinol (ZYLOPRIM) 100 mg tablet Take 1 Tab by mouth daily. 30 Tab 5    glucose blood VI test strips (RELION PRIME TEST STRIPS) strip Use as directed 50 Strip 2    albuterol (PROVENTIL HFA, VENTOLIN HFA, PROAIR HFA) 90 mcg/actuation inhaler Take 2 Puffs by inhalation every four (4) hours as needed for Wheezing. 1 Inhaler 0    HYDROmorphone (EXALGO ER) 8 mg Tb24 Take 8 mg by mouth daily. Max Daily Amount: 8 mg. For chronic pain (due to fill 16) replaces opana ER 30 Tab 0    OxyMORPhone 20 mg PO ER tablet Take 20 mg by mouth every twelve (12) hours.  OTHER Motorized Wheelchair 1 Device prn    multivitamin (ONE A DAY) tablet Take 1 tablet by mouth daily.  cyanocobalamin 2,000 mcg TbER Take  by mouth daily.  HYDROmorphone ER (EXALGO ER) 12 mg tablet Take 1 Tab by mouth daily. Max Daily Amount: 12 mg.  For chronic pain 30 Tab 0    [] oxyCODONE IR (ROXICODONE) 15 mg immediate release tablet Take 1 Tab by mouth three (3) times daily as needed for Pain for up to 15 days. Max Daily Amount: 45 mg. Prn severe pain -    chronic  Indications: Pain 90 Tab 0    [] HYDROmorphone (EXALGO) 8 mg Tb24 Take 8 mg by mouth daily for 15 days. Max Daily Amount: 8 mg. 15 Tab 0    naloxone 4 mg/actuation spry 4 mg by Nasal route as needed for up to 2 doses. Indications: OPIOID TOXICITY 1 Box 1    ascorbic acid (VITAMIN C) 250 mg tablet Take  by mouth.  Cholecalciferol, Vitamin D3, 5,000 unit Tab Take  by mouth daily.  lactobacillus acidophilus (ACIDOPHILUS) 500 million cell Tab Take  by mouth daily. No current facility-administered medications on file prior to visit. These medications have been reviewed and reconciled with the patient during today's visit. Allergies   Allergen Reactions    Latex Rash    Lisinopril Cough     Ace Inhibitor - cough         ROS:     CONST:   Denies fatigue, weight change, appetite change   NEURO:   Denies headaches, vision changes, dizziness, loss of consciousness  CV:      Denies chest pain, palpitations, orthopnea, PND  PULM:  Denies SOB, wheezing, cough, hemoptysis  GI:             Denies nausea, vomiting, abdominal pain, greasy stools, blood in stool,     diarrhea, constipation  :       Denies dysuria, hematuria, change in urine  MS:      Right ankle pain; no joint swelling  SKIN:        Denies rashes, skin changes  ALLERGY: Denies seasonal allergies, itchy eyes    Objective:     VS:    Visit Vitals    /88 (BP 1 Location: Left arm, BP Patient Position: Sitting)    Pulse 96    Temp 98 °F (36.7 °C) (Oral)    Resp 18    Ht 5' 11\" (1.803 m)    Wt 210 lb (95.3 kg)    SpO2 100%    BMI 29.29 kg/m2       General:   Well-nourished, well-groomed, pleasant, alert, in no acute distress.      Head:  Normocephalic, atraumatic, MMM,   Cardiovasc:   Regular rate and rhythm, no murmurs, no rubs, no gallops,   Pulmonary:   Clear breath sounds bilaterally, good air movement, no wheezing, no rales, no rhonchi, normal respiratory effort  Abdomen:   Abdomen soft, nontender, nondistended, NABS,  Extremities:   Mild deformity of medial right medical ankle with; No edema; tenderness with palpation; warm and well-perfused  Neuro:   Alert, conversant, appropriate, following commands, no focal deficits. Unsteady gait, ambulates with cane. Pertinent diagnostic procedures include:  No visits with results within 3 Month(s) from this visit. Latest known visit with results is:    Hospital Outpatient Visit on 07/13/2017   Component Date Value Ref Range Status    WBC 07/13/2017 6.6  4.6 - 13.2 K/uL Final    RBC 07/13/2017 5.50* 4.20 - 5.30 M/uL Final    HGB 07/13/2017 13.3  12.0 - 16.0 g/dL Final    HCT 07/13/2017 40.3  35.0 - 45.0 % Final    MCV 07/13/2017 73.3* 74.0 - 97.0 FL Final    MCH 07/13/2017 24.2  24.0 - 34.0 PG Final    MCHC 07/13/2017 33.0  31.0 - 37.0 g/dL Final    RDW 07/13/2017 14.8* 11.6 - 14.5 % Final    PLATELET 12/49/9223 955  135 - 420 K/uL Final    MPV 07/13/2017 12.6* 9.2 - 11.8 FL Final    TSH 07/13/2017 1.24  0.36 - 3.74 uIU/mL Final       Assessment:       1. Gout, unspecified cause, unspecified chronicity, unspecified site    2. Diabetes 1.5, managed as type 2 ([facility] [address]    3. Edema, unspecified type    4.  Chronic post-traumatic headache, not intractable        Plan:       Orders Placed This Encounter    HEMOGLOBIN A1C WITH EAG     Standing Status:   Future     Standing Expiration Date:   12/23/2018    MICROALBUMIN, UR, RAND W/ MICROALBUMIN/CREA RATIO     Standing Status:   Future     Standing Expiration Date:   6/23/2018    OCCULT BLOOD, IMMUNOASSAY (FIT)     Standing Status:   Future     Standing Expiration Date:   12/23/2018    REFERRAL TO OPHTHALMOLOGY     Referral Priority:   Routine     Referral Type:   Consultation     Referral Reason:   Specialty Services Required     Referred to Provider:   Lisbet Sanchez MD    DISCONTD: allopurinol (ZYLOPRIM) 100 mg tablet     Sig: Take 1 Tab by mouth daily. Dispense:  180 Tab     Refill:  1    metFORMIN (GLUCOPHAGE) 1,000 mg tablet     Sig: Take 1 Tab by mouth two (2) times daily (with meals). Dispense:  180 Tab     Refill:  1    carvedilol (COREG) 25 mg tablet     Sig: TAKE ONE TABLET BY MOUTH TWICE DAILY     Dispense:  180 Tab     Refill:  1     Follow up    cloNIDine HCl (CATAPRES) 0.1 mg tablet     Sig: TAKE ONE TABLET BY MOUTH TWICE DAILY     Dispense:  180 Tab     Refill:  1     Please advise pt she needs to follow up for bp check. If she has insurance now, she needs to go to a clinic that takes her insurance, otherwise return here    spironolactone (ALDACTONE) 25 mg tablet     Sig: TAKE TWO TABLETS BY MOUTH ONCE DAILY     Dispense:  180 Tab     Refill:  1     Please consider 90 day supplies to promote better adherence    nortriptyline (PAMELOR) 50 mg capsule     Sig: Take 1 Cap by mouth daily. Dispense:  30 Cap     Refill:  5    nortriptyline (PAMELOR) 75 mg capsule     Sig: Take 1 Cap by mouth nightly. Dispense:  60 Cap     Refill:  5    allopurinol (ZYLOPRIM) 100 mg tablet     Sig: Take 1 Tab by mouth two (2) times a day. Dispense:  180 Tab     Refill:  1     Hypertension: well controlled on medication. Diabetes: Metformin increased to 1000 mg BID. Gout: Dose of Allopurinol increased to 100 mg twice a day. I have discussed the diagnosis with the patient and the intended plan as seen in the above orders. The patient has received an after-visit summary along with patient information handout. I have discussed medication side effects and warnings with the patient as well. Pt verbalized understanding. Follow-up Disposition:  Return in about 3 months (around 3/22/2018).   YOVANI Harrison  12/22/2017, 4:32 PM

## 2017-12-22 NOTE — PATIENT INSTRUCTIONS
Diabetic Neuropathy: Care Instructions  Your Care Instructions    When you have diabetes, your blood sugar level may get too high. Over time, high blood sugar levels can damage nerves. This is called diabetic neuropathy. Nerve damage can cause pain, burning, tingling, and numbness and may leave you feeling weak. The feet are often affected. When you have nerve damage in your feet, you cannot feel your feet and toes as well as normal and may not notice cuts or sores. Even a small injury can lead to a serious infection. It is very important that you follow your doctor's advice on foot care. Sometimes diabetes damages nerves that help the body function. If this happens, your blood pressure, sweating, digestion, and urination might be affected. Your doctor may give you a target blood sugar level that is higher or lower than you are used to. Try to keep your blood sugar very close to this target level to prevent more damage. Follow-up care is a key part of your treatment and safety. Be sure to make and go to all appointments, and call your doctor if you are having problems. It's also a good idea to know your test results and keep a list of the medicines you take. How can you care for yourself at home? · Take your medicines exactly as prescribed. Call your doctor if you think you are having a problem with your medicine. It is very important that you take your insulin or diabetes pills as your doctor tells you. · Try to keep blood sugar at your target level. ¨ Eat a variety of healthy foods, with carbohydrate spread out in your meals. A dietitian can help you plan meals. ¨ Try to get at least 30 minutes of exercise on most days. ¨ Check your blood sugar as many times each day as your doctor recommends. · Take and record your blood pressure at home if your doctor tells you to. Learn the importance of the two measures of blood pressure (such as 130 over 80, or 130/80).  To take your blood pressure at home:  ¨ Ask your doctor to check your blood pressure monitor to be sure it is accurate and the cuff fits you. Also ask your doctor to watch you to make sure that you are using it right. ¨ Do not use medicine known to raise blood pressure (such as some nasal decongestant sprays) before taking your blood pressure. ¨ Avoid taking your blood pressure if you have just exercised or are nervous or upset. Rest at least 15 minutes before you take a reading. · Take pain medicines exactly as directed. ¨ If the doctor gave you a prescription medicine for pain, take it as prescribed. ¨ If you are not taking a prescription pain medicine, ask your doctor if you can take an over-the-counter medicine. · Do not smoke. Smoking can increase your chance for a heart attack or stroke. If you need help quitting, talk to your doctor about stop-smoking programs and medicines. These can increase your chances of quitting for good. · Limit alcohol to 2 drinks a day for men and 1 drink a day for women. Too much alcohol can cause health problems. · Eat small meals often, rather than 2 or 3 large meals a day. To care for your feet  · Prevent injury by wearing shoes at all times, even when you are indoors. · Do foot care as part of your daily routine. Wash your feet and then rub lotion on your feet, but not between your toes. Use a handheld mirror or magnifying mirror to inspect your feet for blisters, cuts, cracks, or sores. · Have your toenails trimmed and filed straight across. · Wear shoes and socks that fit well. Soft shoes that have good support and that fit well (such as tennis shoes) are best for your feet. · Check your shoes for any loose objects or rough edges before you put them on. · Ask your doctor to check your feet during each visit. Your doctor may notice a foot problem you have missed. · Get early treatment for any foot problem, even a minor one. When should you call for help?   Call your doctor now or seek immediate medical care if:  ? · You have symptoms of infection, such as:  ¨ Increased pain, swelling, warmth, or redness. ¨ Red streaks leading from the area. ¨ Pus draining from the area. ¨ A fever. ? · You have new or worse numbness, pain, or tingling in any part of your body. ? Watch closely for changes in your health, and be sure to contact your doctor if:  ? · You have a new problem with your feet, such as:  ¨ A new sore or ulcer. ¨ A break in the skin that is not healing after several days. ¨ Bleeding corns or calluses. ¨ An ingrown toenail. ? · You do not get better as expected. Where can you learn more? Go to http://uriel-percy.info/. Enter Z384 in the search box to learn more about \"Diabetic Neuropathy: Care Instructions. \"  Current as of: March 13, 2017  Content Version: 11.4  © 8604-0125 [employer]. Care instructions adapted under license by [employer] (which disclaims liability or warranty for this information). If you have questions about a medical condition or this instruction, always ask your healthcare professional. [address] any warranty or liability for your use of this information. Gout: Care Instructions  Your Care Instructions    Gout is a form of arthritis caused by a buildup of uric acid crystals in a joint. It causes sudden attacks of pain, swelling, redness, and stiffness, usually in one joint, especially the big toe. Gout usually comes on without a cause. But it can be brought on by drinking alcohol (especially beer) or eating seafood and red meat. Taking certain medicines, such as diuretics or aspirin, also can bring on an attack of gout. Taking your medicines as prescribed and following up with your doctor regularly can help you avoid gout attacks in the future. Follow-up care is a key part of your treatment and safety.  Be sure to make and go to all appointments, and call your doctor if you are having problems. It's also a good idea to know your test results and keep a list of the medicines you take. How can you care for yourself at home? · If the joint is swollen, put ice or a cold pack on the area for 10 to 20 minutes at a time. Put a thin cloth between the ice and your skin. · Prop up the sore limb on a pillow when you ice it or anytime you sit or lie down during the next 3 days. Try to keep it above the level of your heart. This will help reduce swelling. · Rest sore joints. Avoid activities that put weight or strain on the joints for a few days. Take short rest breaks from your regular activities during the day. · Take your medicines exactly as prescribed. Call your doctor if you think you are having a problem with your medicine. · Take pain medicines exactly as directed. ¨ If the doctor gave you a prescription medicine for pain, take it as prescribed. ¨ If you are not taking a prescription pain medicine, ask your doctor if you can take an over-the-counter medicine. · Eat less seafood and red meat. · Check with your doctor before drinking alcohol. · Losing weight, if you are overweight, may help reduce attacks of gout. But do not go on a [employer]. \" Losing a lot of weight in a short amount of time can cause a gout attack. When should you call for help? Call your doctor now or seek immediate medical care if:  ? · You have a fever. ? · The joint is so painful you cannot use it. ? · You have sudden, unexplained swelling, redness, warmth, or severe pain in one or more joints. ? Watch closely for changes in your health, and be sure to contact your doctor if:  ? · You have joint pain. ? · Your symptoms get worse or are not improving after 2 or 3 days. Where can you learn more? Go to http://uriel-percy.info/. Enter I739 in the search box to learn more about \"Gout: Care Instructions. \"  Current as of: October 31, 2016  Content Version: 11.4  © 8366-8748 [employer]. Care instructions adapted under license by [employer] (which disclaims liability or warranty for this information). If you have questions about a medical condition or this instruction, always ask your healthcare professional. Marlene Trevizo any warranty or liability for your use of this information. Purine-Restricted Diet: Care Instructions  Your Care Instructions    Purines are substances that are found in some foods. Your body turns purines into uric acid. High levels of uric acid can cause gout, which is a form of arthritis that causes pain and inflammation in joints. You may be able to help control the amount of uric acid in your body by limiting high-purine foods in your diet. Follow-up care is a key part of your treatment and safety. Be sure to make and go to all appointments, and call your doctor if you are having problems. It's also a good idea to know your test results and keep a list of the medicines you take. How can you care for yourself at home? · Plan your meals and snacks around foods that are low in purines and are safe for you to eat. These foods include:  ¨ Green vegetables and tomatoes. ¨ Fruits. ¨ Whole-grain breads, rice, and cereals. ¨ Eggs, peanut butter, and nuts. ¨ Low-fat milk, cheese, and other milk products. ¨ Popcorn. ¨ Gelatin desserts, chocolate, cocoa, and cakes and sweets, in small amounts. · You can eat certain foods that are medium-high in purines, but eat them only once in a while. These foods include:  ¨ Legumes, such as dried beans and dried peas. You can have 1 cup cooked legumes each day. ¨ Asparagus, cauliflower, spinach, mushrooms, and green peas. ¨ Fish and seafood (other than very high-purine seafood). ¨ Oatmeal, wheat bran, and wheat germ. · Limit very high-purine foods, including:  ¨ Organ meats, such as liver, kidneys, sweetbreads, and brains.   ¨ Meats, including palm, beef, pork, and lamb.  ¨ Game meats and any other meats in large amounts. ¨ Anchovies, sardines, herring, mackerel, and scallops. ¨ Gravy. ¨ Beer. Where can you learn more? Go to http://uriel-percy.info/. Enter F448 in the search box to learn more about \"Purine-Restricted Diet: Care Instructions. \"  Current as of: May 12, 2017  Content Version: 11.4  © 6764-9838 Health[employer], [employer]. Care instructions adapted under license by [employer] (which disclaims liability or warranty for this information). If you have questions about a medical condition or this instruction, always ask your healthcare professional. [address] any warranty or liability for your use of this information.

## 2018-01-31 ENCOUNTER — OFFICE VISIT (OUTPATIENT)
Dept: PAIN MANAGEMENT | Age: 53
End: 2018-01-31

## 2018-01-31 VITALS
WEIGHT: 210 LBS | TEMPERATURE: 96.6 F | HEART RATE: 88 BPM | HEIGHT: 71 IN | SYSTOLIC BLOOD PRESSURE: 166 MMHG | BODY MASS INDEX: 29.4 KG/M2 | RESPIRATION RATE: 16 BRPM | DIASTOLIC BLOOD PRESSURE: 104 MMHG

## 2018-01-31 DIAGNOSIS — M54.5 CHRONIC MIDLINE LOW BACK PAIN, WITH SCIATICA PRESENCE UNSPECIFIED: ICD-10-CM

## 2018-01-31 DIAGNOSIS — M51.37 DEGENERATION OF LUMBAR OR LUMBOSACRAL INTERVERTEBRAL DISC: ICD-10-CM

## 2018-01-31 DIAGNOSIS — G89.4 CHRONIC PAIN SYNDROME: ICD-10-CM

## 2018-01-31 DIAGNOSIS — M54.2 NECK PAIN: ICD-10-CM

## 2018-01-31 DIAGNOSIS — M54.2 CERVICALGIA: Primary | ICD-10-CM

## 2018-01-31 DIAGNOSIS — M50.30 DEGENERATION OF CERVICAL INTERVERTEBRAL DISC: ICD-10-CM

## 2018-01-31 DIAGNOSIS — G89.29 CHRONIC MIDLINE LOW BACK PAIN, WITH SCIATICA PRESENCE UNSPECIFIED: ICD-10-CM

## 2018-01-31 LAB
ALCOHOL UR POC: NORMAL
AMPHETAMINES UR POC: NEGATIVE
BARBITURATES UR POC: NEGATIVE
BENZODIAZEPINES UR POC: NEGATIVE
BUPRENORPHINE UR POC: NEGATIVE
CANNABINOIDS UR POC: NEGATIVE
CARISOPRODOL UR POC: NORMAL
COCAINE UR POC: NEGATIVE
FENTANYL UR POC: NORMAL
MDMA/ECSTASY UR POC: NEGATIVE
METHADONE UR POC: NEGATIVE
METHAMPHETAMINE UR POC: NEGATIVE
METHYLPHENIDATE UR POC: NEGATIVE
OPIATES UR POC: NORMAL
OXYCODONE UR POC: NORMAL
PHENCYCLIDINE UR POC: NORMAL
PROPOXYPHENE UR POC: NORMAL
TRAMADOL UR POC: NORMAL
TRICYCLICS UR POC: NORMAL

## 2018-01-31 RX ORDER — OXYCODONE HYDROCHLORIDE 15 MG/1
15 TABLET ORAL
Qty: 120 TAB | Refills: 0 | Status: SHIPPED | OUTPATIENT
Start: 2018-02-27 | End: 2018-03-29

## 2018-01-31 RX ORDER — OXYCODONE HYDROCHLORIDE 15 MG/1
15 TABLET ORAL
Qty: 120 TAB | Refills: 0 | Status: SHIPPED | OUTPATIENT
Start: 2018-01-31 | End: 2018-03-02

## 2018-01-31 RX ORDER — HYDROMORPHONE HYDROCHLORIDE 12 MG/1
12 TABLET, EXTENDED RELEASE ORAL DAILY
Qty: 30 TAB | Refills: 0 | Status: SHIPPED | OUTPATIENT
Start: 2018-03-26 | End: 2018-05-01

## 2018-01-31 RX ORDER — HYDROMORPHONE HYDROCHLORIDE 12 MG/1
12 TABLET, EXTENDED RELEASE ORAL DAILY
Qty: 30 TAB | Refills: 0 | Status: SHIPPED | OUTPATIENT
Start: 2018-01-31 | End: 2018-03-02

## 2018-01-31 RX ORDER — HYDROMORPHONE HYDROCHLORIDE 12 MG/1
12 TABLET, EXTENDED RELEASE ORAL DAILY
Qty: 30 TAB | Refills: 0 | Status: SHIPPED | OUTPATIENT
Start: 2018-02-27 | End: 2018-05-01

## 2018-01-31 RX ORDER — OXYCODONE HYDROCHLORIDE 15 MG/1
15 TABLET ORAL 4 TIMES DAILY
Qty: 120 TAB | Refills: 0 | Status: SHIPPED | OUTPATIENT
Start: 2018-03-26 | End: 2018-04-25

## 2018-01-31 NOTE — PROGRESS NOTES
HISTORY OF PRESENT ILLNESS  Radha Araujo is a 50 y.o. female. HPI Comments: Visit survey reviewed  Chief complaint chronic pain syndrome neck pain, low back pain  The patient will continue with extended release hydromorphone 12 mg once a day  Oxycodone 15 mg 4 times a day as needed  I had a brief visit with the patient today. I was not in the exam room with her very long today. She was coughing incessantly. She wore a mask. She told me that she had the flu. She did request that I prescribe medication for nausea. I informed her that such a prescription to come from her primary care physician or a gastroenterologist.  It should come from a neurologist if it is related to her history of headaches.  -The visit survey indicates that medications help general activity, mood, walking, sleep, enjoyment of life. The patient will continue medications. No new significant side effects reported  Medication continues to help improve quality of life. Medications reviewed including risk and benefits. Recent average level of pain5,6    Measurement of clinical outcome for chronic pain patient/ SPAASMS Score Card-            Information reviewed and will be scanned. Total score today-7    Neck Pain   Pertinent negatives include no chest pain, no abdominal pain and no shortness of breath. Review of Systems   Constitutional: Negative for chills and fever. HENT: Negative for congestion and sore throat. Headaches   Eyes: Negative for blurred vision and double vision. Respiratory: Positive for wheezing ( asthma). Negative for cough and shortness of breath. Cardiovascular: Negative for chest pain and palpitations. Gastrointestinal: Negative for abdominal pain, constipation, diarrhea, heartburn, nausea and vomiting. Genitourinary: Negative. Musculoskeletal: Positive for back pain, joint pain, myalgias and neck pain.    Neurological: Negative for dizziness, seizures and loss of consciousness. Endo/Heme/Allergies: Does not bruise/bleed easily. Psychiatric/Behavioral: Negative for depression. The patient is not nervous/anxious and does not have insomnia. Physical Exam   Constitutional: She appears well-developed and well-nourished. She is cooperative. She does not have a sickly appearance. HENT:   Head: Normocephalic and atraumatic. Right Ear: External ear normal. No drainage. Left Ear: External ear normal. No drainage. Nose: Nose normal.   Eyes: Lids are normal. Right eye exhibits no discharge. Left eye exhibits no discharge. Right conjunctiva has no hemorrhage. Left conjunctiva has no hemorrhage. Neck: Neck supple. No tracheal deviation present. No thyroid mass present. Pulmonary/Chest: Effort normal. No respiratory distress. Neurological: She is alert. No cranial nerve deficit. Skin: Skin is intact. No rash noted. Psychiatric: Her speech is normal. Her affect is not angry. She does not express inappropriate judgment. Nursing note and vitals reviewed. ASSESSMENT and PLAN  Encounter Diagnoses   Name Primary?  Cervicalgia Yes    Chronic midline low back pain, with sciatica presence unspecified     Degeneration of cervical intervertebral disc     Degeneration of lumbar or lumbosacral intervertebral disc     Chronic pain syndrome     Neck pain    No indicators for recent medication misuse.  reviewed. Pain Meds and Quality Of Life have been reviewed. Nonpharmacologic therapy and non-opioid pharmacologic therapy were considered. If opioid therapy is prescribed, this is only if the expected benefits are anticipated to outweigh risks. Possible changes to treatment plan considered. Support/education given as needed. Today-medications are as listed. No significant changes to medications. Follow up -- 3 months.   Urine test today

## 2018-01-31 NOTE — MR AVS SNAPSHOT
[address] [facility] [address] [phone] Patient: Jovani Paulson MRN: [id number] [id number] Visit Information Date & Time Provider Department Dept. Phone Encounter #  
 1/31/2018  2:45 PM Majo Santiago MD [facility]  Follow-up Instructions Return in about 3 months (around 4/30/2018). Your Appointments 3/22/2018  5:00 PM  
ROUTINE CARE with MD Sena Bell [address] [facility]) Appt Note: routine f/u 3mo [address] [facility] [id number]  
[facility] [id number] Upcoming Health Maintenance Date Due  
 EYE EXAM RETINAL OR DILATED Q1 9/13/1977 Pneumococcal 19-64 Medium Risk (1 of 1 - PPSV23) 9/13/1986 MICROALBUMIN Q1 8/31/2016 Influenza Age 5 to Adult 8/1/2017 FOBT Q 1 YEAR AGE 50-75 9/13/2017 HEMOGLOBIN A1C Q6M 11/17/2017 FOOT EXAM Q1 5/17/2018 LIPID PANEL Q1 5/17/2018 BREAST CANCER SCRN MAMMOGRAM 3/31/2019 PAP AKA CERVICAL CYTOLOGY 5/17/2020 DTaP/Tdap/Td series (2 - Td) 8/31/2025 Allergies as of 1/31/2018  Review Complete On: 1/31/2018 By: Yosvany Diaz LPN Severity Noted Reaction Type Reactions Latex  08/26/2014    Rash Lisinopril  06/07/2012   Intolerance Cough Ace Inhibitor - cough Current Immunizations  Reviewed on 8/31/2015 Name Date Influenza Vaccine 9/25/2015, 10/30/2013 Tdap 8/31/2015, 5/22/2014  6:52 PM  
  
 Not reviewed this visit You Were Diagnosed With   
  
 Codes Comments Chronic midline low back pain, with sciatica presence unspecified    -  Primary ICD-10-CM: M54.5, G89.29 ICD-9-CM: 724.2, 338.29 Cervicalgia     ICD-10-CM: M54.2 ICD-9-CM: 723.1 Degeneration of cervical intervertebral disc     ICD-10-CM: M50.30 ICD-9-CM: 722.4 Degeneration of lumbar or lumbosacral intervertebral disc     ICD-10-CM: M51.37 
ICD-9-CM: 722.52 Chronic pain syndrome     ICD-10-CM: G89.4 ICD-9-CM: 338.4 Neck pain     ICD-10-CM: M54.2 ICD-9-CM: 723.1 Vitals BP Pulse Temp Resp Height(growth percentile) Weight(growth percentile) (!) 166/104 (BP 1 Location: Left arm, BP Patient Position: Sitting) 88 96.6 °F (35.9 °C) (Oral) 16 5' 11\" (1.803 m) 210 lb (95.3 kg) LMP BMI OB Status Smoking Status 01/29/2018 29.29 kg/m2 Having regular periods Former Smoker Vitals History BMI and BSA Data Body Mass Index Body Surface Area  
 29.29 kg/m 2 2.18 m 2 Preferred Pharmacy Pharmacy Name Phone [address] [phone] Your Updated Medication List  
  
   
This list is accurate as of: 1/31/18  3:22 PM.  Always use your most recent med list.  
  
  
  
  
 ACIDOPHILUS 500 million cell Tab Generic drug:  Lactobacillus acidophilus Take  by mouth daily. albuterol 90 mcg/actuation inhaler Commonly known as:  PROVENTIL HFA, VENTOLIN HFA, PROAIR HFA Take 2 Puffs by inhalation every four (4) hours as needed for Wheezing. allopurinol 100 mg tablet Commonly known as:  Mitcheal Barbara Take 1 Tab by mouth two (2) times a day. carvedilol 25 mg tablet Commonly known as:  COREG  
TAKE ONE TABLET BY MOUTH TWICE DAILY  
  
 cholecalciferol (VITAMIN D3) 5,000 unit Tab tablet Commonly known as:  VITAMIN D3 Take  by mouth daily. cloNIDine HCl 0.1 mg tablet Commonly known as:  CATAPRES  
TAKE ONE TABLET BY MOUTH TWICE DAILY  
  
 cyanocobalamin 2,000 mcg Tber Take  by mouth daily. glucose blood VI test strips strip Commonly known as:  RELION PRIME TEST STRIPS Use as directed * HYDROmorphone 8 mg Tb24 Commonly known asDina Short ER Take 8 mg by mouth daily. Max Daily Amount: 8 mg.  For chronic pain (due to fill 7/2/16) replaces opana ER  
  
 * HYDROmorphone ER 12 mg tablet Commonly known asRoxanna Battles ER Take 1 Tab by mouth daily. Max Daily Amount: 12 mg. Due to fill 4/28/17 For chronic pain  
  
 * HYDROmorphone ER 12 mg tablet Commonly known asRoxanna Battles ER Take 1 Tab by mouth daily. Max Daily Amount: 12 mg. For chronic pain  
  
 * HYDROmorphone ER 12 mg tablet Commonly known asRoxanna Battles ER Take 1 Tab by mouth daily. Max Daily Amount: 12 mg. For chronic pain  
  
 * HYDROmorphone ER 12 mg tablet Commonly known asRoxanna Battles ER Take 1 Tab by mouth daily for 30 days. Max Daily Amount: 12 mg. For chronic pain  
  
 ibuprofen 600 mg tablet Commonly known as:  MOTRIN Take 1 Tab by mouth every six (6) hours as needed for Pain.  
  
 lovastatin 20 mg tablet Commonly known as:  MEVACOR Take 1 Tab by mouth nightly. metFORMIN 1,000 mg tablet Commonly known as:  GLUCOPHAGE Take 1 Tab by mouth two (2) times daily (with meals). multivitamin tablet Commonly known as:  ONE A DAY Take 1 tablet by mouth daily. naloxone 4 mg/actuation nasal spray Commonly known as:  NARCAN  
4 mg by Nasal route as needed for up to 2 doses. Indications: OPIOID TOXICITY  
  
 * nortriptyline 50 mg capsule Commonly known as:  PAMELOR Take 1 Cap by mouth daily. * nortriptyline 75 mg capsule Commonly known as:  PAMELOR Take 1 Cap by mouth nightly. ondansetron hcl 8 mg tablet Commonly known as:  ZOFRAN (AS HYDROCHLORIDE) Take 1 Tab by mouth every eight (8) hours as needed for Nausea. OTHER Motorized Wheelchair  
  
 oxybutynin 5 mg tablet Commonly known as:  ZVTBRWOM Take 1 Tab by mouth two (2) times a day. oxyCODONE IR 15 mg immediate release tablet Commonly known as:  Bhavani Favor Take 1 Tab by mouth four (4) times daily as needed for Pain for up to 30 days. Max Daily Amount: 60 mg. Prn severe pain -    chronic  Indications: Pain oxyMORphone 20 mg ER tablet Commonly known as:  OPANA ER Take 20 mg by mouth every twelve (12) hours. spironolactone 25 mg tablet Commonly known as:  ALDACTONE  
TAKE TWO TABLETS BY MOUTH ONCE DAILY  
  
 VITAMIN C 250 mg tablet Generic drug:  ascorbic acid (vitamin C) Take  by mouth. * Notice: This list has 7 medication(s) that are the same as other medications prescribed for you. Read the directions carefully, and ask your doctor or other care provider to review them with you. Follow-up Instructions Return in about 3 months (around 4/30/2018). Patient Instructions Preventing Falls: Care Instructions Your Care Instructions Getting around your home safely can be a challenge if you have injuries or health problems that make it easy for you to fall. Loose rugs and furniture in walkways are among the dangers for many older people who have problems walking or who have poor eyesight. People who have conditions such as arthritis, osteoporosis, or dementia also have to be careful not to fall. You can make your home safer with a few simple measures. Follow-up care is a key part of your treatment and safety. Be sure to make and go to all appointments, and call your doctor if you are having problems. It's also a good idea to know your test results and keep a list of the medicines you take. How can you care for yourself at home? Taking care of yourself · You may get dizzy if you do not drink enough water. To prevent dehydration, drink plenty of fluids, enough so that your urine is light yellow or clear like water. Choose water and other caffeine-free clear liquids. If you have kidney, heart, or liver disease and have to limit fluids, talk with your doctor before you increase the amount of fluids you drink. · Exercise regularly to improve your strength, muscle tone, and balance. Walk if you can. Swimming may be a good choice if you cannot walk easily. · Have your vision and hearing checked each year or any time you notice a change. If you have trouble seeing and hearing, you might not be able to avoid objects and could lose your balance. · Know the side effects of the medicines you take. Ask your doctor or pharmacist whether the medicines you take can affect your balance. Sleeping pills or sedatives can affect your balance. · Limit the amount of alcohol you drink. Alcohol can impair your balance and other senses. · Ask your doctor whether calluses or corns on your feet need to be removed. If you wear loose-fitting shoes because of calluses or corns, you can lose your balance and fall. · Talk to your doctor if you have numbness in your feet. Preventing falls at home · Remove raised doorway thresholds, throw rugs, and clutter. Repair loose carpet or raised areas in the floor. · Move furniture and electrical cords to keep them out of walking paths. · Use nonskid floor wax, and wipe up spills right away, especially on ceramic tile floors. · If you use a walker or cane, put rubber tips on it. If you use crutches, clean the bottoms of them regularly with an abrasive pad, such as steel wool. · Keep your house well lit, especially [facility], and outside walkways. Use night-lights in areas such as hallways and bathrooms. Add extra light switches or use remote switches (such as switches that go on or off when you clap your hands) to make it easier to turn lights on if you have to get up during the night. · Install sturdy handrails on stairways. · Move items in your cabinets so that the things you use a lot are on the lower shelves (about waist level). · Keep a cordless phone and a flashlight with new batteries by your bed. If possible, put a phone in each of the main rooms of your house, or carry a cell phone in case you fall and cannot reach a phone. Or, you can wear a device around your neck or wrist. You push a button that sends a signal for help. · Wear low-heeled shoes that fit well and give your feet good support. Use footwear with nonskid soles. Check the heels and soles of your shoes for wear. Repair or replace worn heels or soles. · Do not wear socks without shoes on wood floors. · Walk on the grass when the sidewalks are slippery. If you live in an area that gets snow and ice in the winter, sprinkle salt on slippery steps and sidewalks. Preventing falls in the bath · Install grab bars and nonskid mats inside and outside your shower or tub and near the toilet and sinks. · Use shower chairs and bath benches. · Use a hand-held shower head that will allow you to sit while showering. · Get into a tub or shower by putting the weaker leg in first. Get out of a tub or shower with your strong side first. 
· Repair loose toilet seats and consider installing a raised toilet seat to make getting on and off the toilet easier. · Keep your bathroom door unlocked while you are in the shower. Where can you learn more? Go to http://urielBeanstalk Taxpercy.info/. Enter 0476 [id number] 69 [id number] in the search box to learn more about \"Preventing Falls: Care Instructions. \" Current as of: May 12, 2017 Content Version: 11.4 © 9899-2728 [employer]. Care instructions adapted under license by [employer] (which disclaims liability or warranty for this information). If you have questions about a medical condition or this instruction, always ask your healthcare professional. [place] [address] any warranty or liability for your use of this information. Acute High Blood Pressure: Care Instructions Your Care Instructions Acute high blood pressure is very high blood pressure. It's a serious problem. Very high blood pressure can damage your brain, heart, eyes, and kidneys. You may have been given medicines to lower your blood pressure. You may have gotten them as pills or through a needle in one of your veins.  This is called an IV. And maybe you were given other medicines too. These can be needed when high blood pressure causes other problems. To keep your blood pressure at a lower level, you may need to make healthy lifestyle changes. And you will probably need to take medicines. Be sure to follow up with your doctor about your blood pressure and what you can do about it. Follow-up care is a key part of your treatment and safety. Be sure to make and go to all appointments, and call your doctor if you are having problems. It's also a good idea to know your test results and keep a list of the medicines you take. How can you care for yourself at home? · See your doctor as often as he or she recommends. This is to make sure your blood pressure is under control. You will probably go at least 2 times a year. But it may be more often at first. 
· Take your blood pressure medicine exactly as prescribed. You may take one or more types. They include diuretics, beta-blockers, ACE inhibitors, calcium channel blockers, and angiotensin II receptor blockers. Call your doctor if you think you are having a problem with your medicine. · If you take blood pressure medicine, talk to your doctor before you take decongestants or anti-inflammatory medicine, such as ibuprofen. These can raise blood pressure. · Learn how to check your blood pressure at home. Check it often. · Ask your doctor if it's okay to drink alcohol. · Talk to your doctor about lifestyle changes that can help blood pressure. These include being active and not smoking. When should you call for help? Call 911 anytime you think you may need emergency care. This may mean having symptoms that suggest that your blood pressure is causing a serious heart or blood vessel problem. Your blood pressure may be over 180/110. ? For example, call 911 if: 
? · You have symptoms of a heart attack. These may include: ¨ Chest pain or pressure, or a strange feeling in the chest. 
 ¨ Sweating. ¨ Shortness of breath. ¨ Nausea or vomiting. ¨ Pain, pressure, or a strange feeling in the back, neck, jaw, or upper belly or in one or both shoulders or arms. ¨ Lightheadedness or sudden weakness. ¨ A fast or irregular heartbeat. ? · You have symptoms of a stroke. These may include: 
¨ Sudden numbness, tingling, weakness, or loss of movement in your face, arm, or leg, especially on only one side of your body. ¨ Sudden vision changes. ¨ Sudden trouble speaking. ¨ Sudden confusion or trouble understanding simple statements. ¨ Sudden problems with walking or balance. ¨ A sudden, severe headache that is different from past headaches. ? · You have severe back or belly pain. ?Do not wait until your blood pressure comes down on its own. Get help right away. ?Call your doctor now or seek immediate care if: 
? · Your blood pressure is much higher than normal (such as 180/110 or higher), but you don't have symptoms. ? · You think high blood pressure is causing symptoms, such as: ¨ Severe headache. ¨ Blurry vision. ? Watch closely for changes in your health, and be sure to contact your doctor if: 
? · Your blood pressure measures 140/90 or higher at least 2 times. That means the top number is 140 or higher or the bottom number is 90 or higher, or both. ? · You think you may be having side effects from your blood pressure medicine. ? · Your blood pressure is usually normal, but it goes above normal at least 2 times. Where can you learn more? Go to http://uriel-percy.info/. Enter M665 in the search box to learn more about \"Acute High Blood Pressure: Care Instructions. \" Current as of: September 21, 2016 Content Version: 11.4 © 1779-5766 [employer]. Care instructions adapted under license by [employer] (which disclaims liability or warranty for this information).  If you have questions about a medical condition or this instruction, always ask your healthcare professional. [address] any warranty or liability for your use of this information. Introducing [facility]! Jame Zamora introduces Lat49 patient portal. Now you can access parts of your medical record, email your doctor's office, and request medication refills online. 1. In your internet browser, go to [email or web address] 2. Click on the First Time User? Click Here link in the Sign In box. You will see the New Member Sign Up page. 3. Enter your Lat49 Access Code exactly as it appears below. You will not need to use this code after youve completed the sign-up process. If you do not sign up before the expiration date, you must request a new code. · Lat49 Access Code: [email or web address] Expires: 5/1/2018  3:22 PM 
 
4. Enter the last four digits of your Social Security Number (xxxx) and Date of Birth (mm/dd/yyyy) as indicated and click Submit. You will be taken to the next sign-up page. 5. Create a Lat49 ID. This will be your Lat49 login ID and cannot be changed, so think of one that is secure and easy to remember. 6. Create a Lat49 password. You can change your password at any time. 7. Enter your Password Reset Question and Answer. This can be used at a later time if you forget your password. 8. Enter your e-mail address. You will receive e-mail notification when new information is available in [address] 9. Click Sign Up. You can now view and download portions of your medical record. 10. Click the Download Summary menu link to download a portable copy of your medical information. If you have questions, please visit the Frequently Asked Questions section of the Lat49 website. Remember, Lat49 is NOT to be used for urgent needs. For medical emergencies, dial 911. Now available from your iPhone and Android! Please provide this summary of care documentation to your next provider. Your primary care clinician is listed as [address]. If you have any questions after today's visit, please call [phone].

## 2018-01-31 NOTE — PROGRESS NOTES
Nursing Notes    Patient presents to the office today in follow-up. Patient rates her pain at 5/10 on the numerical pain scale. Reviewed medications with counts as follows:    Rx Date filled Qty Dispensed Pill Count Last Dose Short   Oxycodone 15 mg  01/19/18 120 70 today no   exalgo ER 12 mg  12/20/17 30 37 today no                           POC UDS was performed in office today    Any new labs or imaging since last appointment? NO    Have you been to an emergency room (ER) or urgent care clinic since your last visit? NO            Have you been hospitalized since your last visit? NO     If yes, where, when, and reason for visit? Have you seen or consulted any other health care providers outside of the [address] [place] [address]  since your last visit? NO     If yes, where, when, and reason for visit? HM deferred to pcp.

## 2018-04-25 ENCOUNTER — TELEPHONE (OUTPATIENT)
Dept: NEUROLOGY | Age: 53
End: 2018-04-25

## 2018-04-25 NOTE — TELEPHONE ENCOUNTER
Pt called to speake with  PSR. Did not wish to speak with me and would not give information to me.   Pt did say it is regarding her medication

## 2018-05-01 ENCOUNTER — OFFICE VISIT (OUTPATIENT)
Dept: PAIN MANAGEMENT | Age: 53
End: 2018-05-01

## 2018-05-01 VITALS
DIASTOLIC BLOOD PRESSURE: 97 MMHG | HEIGHT: 71 IN | RESPIRATION RATE: 14 BRPM | WEIGHT: 210 LBS | SYSTOLIC BLOOD PRESSURE: 137 MMHG | HEART RATE: 84 BPM | TEMPERATURE: 97.3 F | BODY MASS INDEX: 29.4 KG/M2

## 2018-05-01 DIAGNOSIS — M54.2 NECK PAIN: Primary | ICD-10-CM

## 2018-05-01 DIAGNOSIS — G89.21 CHRONIC PAIN DUE TO TRAUMA: ICD-10-CM

## 2018-05-01 DIAGNOSIS — G44.329 CHRONIC POST-TRAUMATIC HEADACHE, NOT INTRACTABLE: ICD-10-CM

## 2018-05-01 DIAGNOSIS — M54.17 LUMBOSACRAL RADICULITIS: ICD-10-CM

## 2018-05-01 DIAGNOSIS — Z79.899 HIGH RISK MEDICATION USE: ICD-10-CM

## 2018-05-01 RX ORDER — HYDROMORPHONE HYDROCHLORIDE 8 MG/1
8 TABLET, EXTENDED RELEASE ORAL DAILY
Qty: 30 TAB | Refills: 0 | Status: SHIPPED | OUTPATIENT
Start: 2018-05-10 | End: 2018-05-01

## 2018-05-01 RX ORDER — NORTRIPTYLINE HYDROCHLORIDE 50 MG/1
50 CAPSULE ORAL
Qty: 30 CAP | Refills: 0 | Status: SHIPPED | OUTPATIENT
Start: 2018-05-01 | End: 2018-05-31

## 2018-05-01 RX ORDER — AMOXICILLIN 250 MG
CAPSULE ORAL
Qty: 30 TAB | Refills: 0 | Status: SHIPPED | OUTPATIENT
Start: 2018-05-01 | End: 2018-08-10

## 2018-05-01 RX ORDER — OXYCODONE HYDROCHLORIDE 15 MG/1
15 TABLET ORAL
Qty: 90 TAB | Refills: 0 | Status: SHIPPED | OUTPATIENT
Start: 2018-05-10 | End: 2018-05-01

## 2018-05-01 RX ORDER — OXYCODONE HYDROCHLORIDE 15 MG/1
15 TABLET ORAL
Qty: 90 TAB | Refills: 0 | Status: SHIPPED | OUTPATIENT
Start: 2018-05-09 | End: 2018-06-07 | Stop reason: SDUPTHER

## 2018-05-01 RX ORDER — DOCUSATE SODIUM 100 MG/1
100 CAPSULE, LIQUID FILLED ORAL
Qty: 60 CAP | Refills: 2 | Status: SHIPPED | OUTPATIENT
Start: 2018-05-01 | End: 2018-07-10

## 2018-05-01 RX ORDER — TENS UNIT
EACH MISCELLANEOUS
Qty: 1 EACH | Refills: 0 | Status: SHIPPED | OUTPATIENT
Start: 2018-05-01

## 2018-05-01 RX ORDER — HYDROMORPHONE HYDROCHLORIDE 8 MG/1
8 TABLET, EXTENDED RELEASE ORAL DAILY
Qty: 30 TAB | Refills: 0 | Status: SHIPPED | OUTPATIENT
Start: 2018-05-09 | End: 2018-06-07 | Stop reason: SDUPTHER

## 2018-05-01 NOTE — PROGRESS NOTES
Nursing Notes    Patient presents to the office today in follow-up. Patient rates her pain at 5/10 on the numerical pain scale. Reviewed medications with counts as follows:    Rx Date filled Qty Dispensed Pill Count Last Dose Short   exalgo ER 12 mg  04/11/18 30 9 This a.m. no   Oxycodone 15 mg  04/11/18 120 38 today no                             POC UDS was not performed in office today    Any new labs or imaging since last appointment? NO    Have you been to an emergency room (ER) or urgent care clinic since your last visit? NO            Have you been hospitalized since your last visit? NO     If yes, where, when, and reason for visit? Have you seen or consulted any other health care providers outside of the [address] [place] [address]  since your last visit? NO       If yes, where, when, and reason for visit? Ms. Chantel Wilson has a reminder for a \"due or due soon\" health maintenance. I have asked that she contact her primary care provider for follow-up on this health maintenance.

## 2018-05-01 NOTE — PROGRESS NOTES
Referral  source Worker's Compensation and question low back pain and neck pain. HPI:  Lelia Edgar is a 50 y.o. female here for follow-up ongoing evaluation and treatment for chronic lower back pain chronic neck pain from work-related injuries. Pt was last seen here on January 31, 2018. Pt denies interval changes on the character or distribution of pain. Pain is located at the lumbosacral belt line with right lower extremity radiation from the medial thigh to the toes and from the posterior medial thigh to the plantar surface of the foot and all 5 toes. Cervical pain is located along spine and bilateral upper trapezius region with radiation in the right upper extremity. The pain is described as constant burning and stabbing pain with pins and needles in the right upper extremity and right lower extremity and ranges from 4-9/10. The pain is worsened by activity, movement. Symptoms are relieved by opioids. Patient also suffers postconcussion headache from the same event but treats with neurology for the residuals of this traumatic brain injury. She denies any bowel or bladder incontinence, new weakness or new numbness tingling. She continues to have both right upper extremity and right lower extremity weakness with decreased motor control of the right lower extremity. She reports slurred speech and mild dysphagia with frequent aspiration since the time of the injury. She currently takes hydromorphone extended release 12 mg once daily and oxycodone immediate release 15 mg up to 4 times daily as needed for pain of which she always takes all 4 tablets. She reports adequate pain control with this regimen without adverse effects. She reports strict compliance with the opioid care agreement.   She states that the medications help her to perform her activities of daily living but also states that she would like more mental clarity throughout the day and actually hold off on taking oxycodone immediate release prior to times of social interaction in order to obtain clarity. Social History     Social History    Marital status:      Spouse name: N/A    Number of children: N/A    Years of education: N/A     Occupational History    nurse assistant      Social History Main Topics    Smoking status: Former Smoker     Types: Cigarettes    Smokeless tobacco: Never Used      Comment: Smoke for 26 years    Alcohol use No    Drug use: No    Sexual activity: Yes     Partners: Male     Birth control/ protection: None     Other Topics Concern    Not on file     Social History Narrative     Family History   Problem Relation Age of Onset    Heart Attack Mother     Heart Disease Mother     Diabetes Mother     Pacemaker Mother     Stroke Father     Cancer Father     Hypertension Maternal Grandmother     Heart Attack Maternal Grandmother     Diabetes Maternal Grandmother     Cancer Maternal Grandmother     Hypertension Maternal Grandfather     Diabetes Maternal Grandfather     Hypertension Paternal Grandmother     Diabetes Paternal Grandmother     Breast Cancer Paternal Grandmother     Hypertension Paternal Grandfather     Diabetes Paternal Grandfather     Heart Attack Sister     Breast Cancer Maternal Aunt     Cancer Maternal Aunt     Breast Cancer Paternal Aunt     Cancer Maternal Uncle     Cancer Niece      Allergies   Allergen Reactions    Latex Rash    Lisinopril Cough     Ace Inhibitor - cough     Past Medical History:   Diagnosis Date    Abnormal nuclear cardiac imaging test 07/10/2012    Mod mid to distal anterior septal ischemia. EF 47%. Mid to distal anterior septal hypk. Normal EKG portion of stress test.  Intermediate high risk.     Asthma     Back pain     injury at work June 2014    Coronary atherosclerosis of native coronary artery     angiographically normal coronaries with dLAD bridging (july 2012)    Diabetes mellitus type II, controlled ([facility] [address]     Forgetfulness since injury June 2014    Fracture of left humerus     GERD (gastroesophageal reflux disease)     Gout     Headache(784.0)     Hypertension     Lack of coordination     since injury June 2014    Neck pain     injury at work June 2014    Obesity     S/P cardiac cath 07/10/2012    dLAD w/mild to mod bridging. Otherwise patent coronaries. At least mod LVH. EF 65%. Past Surgical History:   Procedure Laterality Date    HX APPENDECTOMY      HX GI      Gallbladder    HX HEART CATHETERIZATION  7/10/2012    HX ORTHOPAEDIC      left arm     Current Outpatient Prescriptions on File Prior to Visit   Medication Sig    metFORMIN (GLUCOPHAGE) 1,000 mg tablet Take 1 Tab by mouth two (2) times daily (with meals).  carvedilol (COREG) 25 mg tablet TAKE ONE TABLET BY MOUTH TWICE DAILY    cloNIDine HCl (CATAPRES) 0.1 mg tablet TAKE ONE TABLET BY MOUTH TWICE DAILY    spironolactone (ALDACTONE) 25 mg tablet TAKE TWO TABLETS BY MOUTH ONCE DAILY    allopurinol (ZYLOPRIM) 100 mg tablet Take 1 Tab by mouth two (2) times a day.  ibuprofen (MOTRIN) 600 mg tablet Take 1 Tab by mouth every six (6) hours as needed for Pain.  lovastatin (MEVACOR) 20 mg tablet Take 1 Tab by mouth nightly.  oxybutynin (DITROPAN) 5 mg tablet Take 1 Tab by mouth two (2) times a day.  glucose blood VI test strips (RELION PRIME TEST STRIPS) strip Use as directed    albuterol (PROVENTIL HFA, VENTOLIN HFA, PROAIR HFA) 90 mcg/actuation inhaler Take 2 Puffs by inhalation every four (4) hours as needed for Wheezing.  OTHER Motorized Wheelchair    multivitamin (ONE A DAY) tablet Take 1 tablet by mouth daily.  ascorbic acid (VITAMIN C) 250 mg tablet Take 250 mg by mouth daily.  naloxone 4 mg/actuation spry 4 mg by Nasal route as needed for up to 2 doses. Indications: OPIOID TOXICITY    ondansetron hcl (ZOFRAN, AS HYDROCHLORIDE,) 8 mg tablet Take 1 Tab by mouth every eight (8) hours as needed for Nausea.     Cholecalciferol, Vitamin D3, 5,000 unit Tab Take  by mouth daily.  cyanocobalamin 2,000 mcg TbER Take  by mouth daily.  lactobacillus acidophilus (ACIDOPHILUS) 500 million cell Tab Take  by mouth daily. No current facility-administered medications on file prior to visit. ROS:  ROS  Denies fever, chills, nausea, vomiting, diarrhea, constipation, abdominal pain, chest pain, difficulty breathing, wheezing, acute changes in vision or hearing, suicidal ideation, homicidal ideation. Positive for mild dysphagia since the injury. Positive for slurred speech since the injury. Positive for frequent aspiration since the injury. Positive for right-sided weakness since the injury positive for intermittent falls since the injury secondary to right lower extremity either giving out or poor motor control. Opioid specific risk:  Subjective reports memory deficits, obesity, asthma, polypharmacy,, long-term opioid therapy, depression. Vitals:    05/01/18 1445   BP: (!) 137/97   Pulse: 84   Resp: 14   Temp: 97.3 °F (36.3 °C)   TempSrc: Oral   Weight: 95.3 kg (210 lb)   Height: 5' 11\" (1.803 m)   PainSc:   5   PainLoc: Back   LMP: 04/25/2018        PE:  AFVSS slightly elevated blood pressure, no acute distress, obese body habitus. A&OXs 3.  normocephalic, atraumatic. Conjugate gaze, clear sclerae, pupils equal and reactive to light, EOMI. cranial nerves II through XII are intact however there is mild facial asymmetry with mild drooping of the left lower face. UE:   Strength for right upper extremity is 4/5 for shoulder abduction, elbow flexion, wrist extension, elbow extension, finger abduction, flexor digitorum profundus to digits 2 through 5. Strength for left upper extremity is 5/5 for shoulder abduction, elbow flexion, wrist extension, elbow extension, finger abduction, flexor digitorum profundus to digits 2 through 5. No hand atrophy is noted.   Mild right-sided dysdiadochokinesia present. Decreased active range of motion of bilateral glenohumeral joints for abduction and flexion. LE:   Strength for right lower extremity is 5/5 for hip flexion, knee extension, dorsiflexion, extensor hallucis longus, at least 3+/5 for plantar flexion to see weightbearing testing was limited secondary to increased right lower extremity pain). Strength for left lower extremity is 5/5 for hip flexion, knee extension, dorsiflexion, extensor hallucis longus, plantar flexion. Neuro:   Muscle stretch reflexes for right upper extremity are 2+ for C5, C6, C7. Muscle stretch reflexes for left upper extremity are 2+ for C5, C6, C7. Muscle Stretch reflexes for right lower extremity are 2+ for L4,  S1.   Muscle Stretch reflexes for left  lower extremity are 2+ for L4, S1.   Negative ankle clonus on the right and the left. Downgoing plantar response on the right and the left. Negative Hoffmans on the right and the left. Mild increased tone in the right upper and lower extremities. MSK: Exam limited secondary to increased pain and poor tolerance of physical exam.  Negative seated straight leg raise bilaterally. Tender to palpation over the lumbosacral junction and bilateral sacroiliac joint regions lumbar spine and lumbar paraspinals bilaterally, bilateral upper trapezius and cervical paraspinals. No tenderness to palpation of bilateral piriformis muscle or bilateral greater trochanters      Gait is modified independent with a model cane with very decreased frances and antalgia. Balance is modified independent with assistive device and decreased without assistive device with severe deficits with single leg stance. Sensation to light touch is intact for left C4-T1 and left L2 through S2 and right L2, L4, S2 decreased right C4-T1 and right L3, L5, S1.    Speech is slowed and slurred but appropriate.     Non organic signs: Negative for:  -Tenderness  -Simulation  -Distraction  -regional  -reaction-positive      Calculated   Naloxone rescue - yes  Prophylactic bowel program - no    , date checked May 1, 2018, findingsconsistent    Primary Care Physician  [address] [address] [phone]      FLORENTINO -62%    COMM-6    GIC-5 and 5    PHQ -- . PHQ over the last two weeks 5/1/2018   Little interest or pleasure in doing things Not at all   Feeling down, depressed or hopeless Not at all   Total Score PHQ 2 0   Trouble falling or staying asleep, or sleeping too much -   Feeling tired or having little energy -   Poor appetite or overeating -   Feeling bad about yourself - or that you are a failure or have let yourself or your family down -   Trouble concentrating on things such as school, work, reading or watching TV -   Moving or speaking so slowly that other people could have noticed; or the opposite being so fidgety that others notice -   Thoughts of being better off dead, or hurting yourself in some way -   PHQ 9 Score -   How difficult have these problems made it for you to do your work, take care of your home and get along with others -           DSM V-OUD Screen--positive for mild to moderate opioid use disorder    Assessment/Plan:     ICD-10-CM ICD-9-CM    1. Neck pain M54.2 723.1 tens unit electrodes (TENS UNITS ELECTRODES) 2X2 \" pads      HYDROmorphone (EXALGO ER) 8 mg Tb24      oxyCODONE IR (OXY-IR) 15 mg immediate release tablet      DISCONTINUED: HYDROmorphone (EXALGO ER) 8 mg Tb24      DISCONTINUED: oxyCODONE IR (OXY-IR) 15 mg immediate release tablet   2.  Chronic pain due to trauma G89.21 338.21 nortriptyline (PAMELOR) 50 mg capsule      XR SPINE LUMB COMP W BEND      tens unit electrodes (TENS UNITS ELECTRODES) 2X2 \" pads      REFERRAL TO ACUPUNCTURE      HYDROmorphone (EXALGO ER) 8 mg Tb24      oxyCODONE IR (OXY-IR) 15 mg immediate release tablet      DISCONTINUED: HYDROmorphone (EXALGO ER) 8 mg Tb24      DISCONTINUED: oxyCODONE IR (OXY-IR) 15 mg immediate release tablet   3. Lumbosacral radiculitis M54.17 724.4 XR SPINE LUMB COMP W BEND      tens unit electrodes (TENS UNITS ELECTRODES) 2X2 \" pads      HYDROmorphone (EXALGO ER) 8 mg Tb24      oxyCODONE IR (OXY-IR) 15 mg immediate release tablet      DISCONTINUED: HYDROmorphone (EXALGO ER) 8 mg Tb24      DISCONTINUED: oxyCODONE IR (OXY-IR) 15 mg immediate release tablet   4. Chronic post-traumatic headache, not intractable G44.329 339.22    5. High risk medication use Z79.899 V58.69 docusate sodium (COLACE) 100 mg capsule      senna-docusate (PERICOLACE) 8.6-50 mg per tablet        Patient with widespread chronic pain especially in the lumbar region and cervicothoracic region. Patient is counseled today on goals of treatment for maximizing function and improving quality of life while hopefully easing suffering from chronic pain. She stated understanding. Part of this approach is to expand the plan of care as much as possible to minimize opioid medications. She has multiple risk factors complicating potential treatment with long-term opioids for chronic pain to include but not limited to:  Subjective reports memory deficits, obesity, asthma, polypharmacy,, long-term opioid therapy, depression, TBI. We will initiate a 10% per month reduction in opioids beginning today. We will reduce hydromorphone extended release from 12 mg daily to 8 mg daily. Will maintain oxycodone immediate release 15 mg to be used up to 3 times daily as needed for chronic pain. We will provide patient with senna and Colace to use as needed for constipation. We will provide the patient with a TENS unit to use in her areas of chronic pain. We will refill nortriptyline 50 mg nightly at bedtime to help with pain reduction, treatment of depression, and to help enhance sleep.     Patient will be referred to acupuncture for trial of treatment to help with her multiple comorbidities including chronic pain. We will obtain a lumbosacral x-ray with flexion and extension films to assess for instability in the region. We will ask the patient's neurologist to again assess the persistence of the slurred speech, the complaints of dysphagia, and the decreased right-sided motor control with very mildly increased tone. GOALS:  Continue to develop complementary and integrated plan of care for chronic pain while minimizing pharmaceuticals and maximizing safety with treatment. Education:  Education provided on safe use of opioid medication. Education provided on strict compliance with opioid care agreement. Education provided on chronic lower back pain. Education provided on chronic lower back pain and sexual activity. Patient Homework: Independently investigate yoga for persons with chronic pain. F/u:. Follow-up Disposition:  Return in about 4 weeks (around 5/29/2018), or if symptoms worsen or fail to improve, for 30 min.

## 2018-05-01 NOTE — PATIENT INSTRUCTIONS
Back Pain and Sex: Care Instructions  Your Care Instructions    It's common to be afraid to have sex when you have back pain. But it doesn't have to stop you from having a satisfying sex life. Talk to your partner about which movements are comfortable for you and which aren't. And if the thought of having pain during sex terrifies you, talk about that too. Follow-up care is a key part of your treatment and safety. Be sure to make and go to all appointments, and call your doctor if you are having problems. It's also a good idea to know your test results and keep a list of the medicines you take. How can you care for yourself at home? · Learn which sexual positions may be good or bad for your back. For example, some back problems cause pain when you bend forward. Others cause problems when you arch your back. · Try positions you've never considered before. You may need to use a firmer surface than your mattress, such as a soft rug on the floor or even a sturdy chair. Oral sex might be easier than intercourse. · Go slow. Sex is like exercise-warming up and stretching first are important. Many people use yoga to gently stretch their muscles. When you're ready to have sex, keep your movements slow and gentle. · Take a hot shower to help relax your muscles. Or have your partner give you a massage. · Increase the time you and your partner spend touching and caressing each other before sex (foreplay). · If it hurts, stop. That may seem obvious, but when things get passionate, it can be hard to stay in control. Try to keep it slow so that you can stop right away if your back starts to hurt. When should you call for help? Watch closely for changes in your health, and be sure to contact your doctor if you have any problems. Where can you learn more? Go to http://uriel-percy.info/. Enter B853 in the search box to learn more about \"Back Pain and Sex: Care Instructions. \"  Current as of: March 21, 2017  Content Version: 11.4  © 5538-1563 [employer]. Care instructions adapted under license by [employer] (which disclaims liability or warranty for this information). If you have questions about a medical condition or this instruction, always ask your healthcare professional. [address] any warranty or liability for your use of this information. Learning About Opioids  Introduction    Opioids are medicines used to relieve moderate to severe pain. They may be used for a short time for pain, such as after surgery. Or in some cases a doctor might prescribe them for long-term pain. They don't cure a health problem. But they help you manage the pain. Opioids relieve pain by changing the way your body feels pain and the way you feel about pain. Sometimes opioids are used for people who can't take other pain medicines. They may be prescribed if you have heart, kidney, or liver problems. For instance, you may take an opioid instead of nonsteroidal anti-inflammatory drugs (NSAIDs). NSAIDs include ibuprofen (Advil, Motrin) and naproxen (Aleve). Opioids are strong medicines. They can help you manage pain when you use them the right way. But if you misuse them, they can cause serious harm and even death. If you decide to take opioids, here are some things to remember. · Keep your doctor informed. You can get addicted to opioids. The risk is higher if you have a history of substance use. Your doctor will monitor you closely for signs of misuse and addiction and to figure out when you no longer need to take opioids. · Make a treatment plan. The goal of your plan is to be able to function and do the things you need to do, even if you still have some pain. You might be able to manage your pain with other non-opioid options like physical therapy, relaxation, or over-the-counter pain medicines. · Be aware of the side effects.  Opioids can cause serious side effects, such as constipation, dry mouth, and nausea. And over time, you may need a higher dose to get pain relief. This is called tolerance. Your body also gets used to opioids. This is called physical dependence. If you suddenly stop taking them, you may have withdrawal symptoms. Examples  Opioids or other medicines that contain them include:  · Codeine (Tylenol 3). · Hydrocodone (Norco). · Oxycodone (OxyContin, Percocet). Safety tips  If you need to take opioids to manage your pain, remember these safety tips. · Follow directions carefully. It's easy to misuse opioids if you take a dose other than what's prescribed by your doctor. This can lead to overdose and even death. Even sharing them with someone they weren't meant for is misuse. · Be cautious. Opioids may affect your judgment and decision making. Do not drive or operate machinery until you can think clearly. Talk with your doctor about when it is safe to drive. · Reduce the risk of drug interactions. Opioids can be dangerous if you take them with alcohol or with certain drugs like sleeping pills and muscle relaxers. Make sure your doctor knows about all the other medicines you take, including over-the-counter medicines. Don't start any new medicines before you talk to your doctor or pharmacist.  · Keep others safe. Store opioids in a safe and secure place. Make sure that pets, children, friends, and family can't get to them. When you're done using opioids, make sure to properly dispose of them. You can either use a community drug take-back program or your drugstore's mail-back program. If one of these programs isn't available, you can flush opioid skin patches and unused opioid pills down the toilet. · Reduce the risk of overdose. Misuse of opioids can be very dangerous. Protect yourself by asking your doctor about a naloxone rescue kit. It can help you-and even save your life-if you take too much of an opioid.   Side effects  Common side effects include:  · Constipation. · Feeling dizzy or lightheaded. You may feel like you might faint. · Feeling sleepy. · Nausea or vomiting. You may have other side effects or reactions. Check the information that comes with your medicine. When should you call for help? Call 911 anytime you think you may need emergency care. For example, call if:  · You have symptoms of a severe allergic reaction. These may include:  ¨ Sudden raised, red areas (hives) all over your body. ¨ Swelling of the throat, mouth, lips, or tongue. ¨ Trouble breathing. ¨ Passing out (losing consciousness). Or you may feel very lightheaded or suddenly feel weak, confused, or restless. · You have signs of an overdose. These include:  ¨ Cold, clammy skin. ¨ Confusion. ¨ Severe nervousness or restlessness. ¨ Severe dizziness, drowsiness, or weakness. ¨ Slow breathing. ¨ Seizures. Call your doctor now or seek immediate medical care if:  · You have symptoms of an allergic reaction, such as:  ¨ A rash or hives (raised, red areas on the skin). ¨ Itching. ¨ Swelling. ¨ Belly pain, nausea, or vomiting. Watch closely for changes in your health, and be sure to contact your doctor if:  · Your medicine is not helping with the pain. · You are having side effects, such as constipation. Where can you learn more? Go to http://uriel-percy.info/. Enter O489 in the search box to learn more about \"Learning About Opioids. \"  Current as of: October 14, 2016  Content Version: 11.4  © 0755-1504 [employer]. Care instructions adapted under license by [employer] (which disclaims liability or warranty for this information). If you have questions about a medical condition or this instruction, always ask your healthcare professional. [place] [address] any warranty or liability for your use of this information.        Developing a Pain Management Plan: Care Instructions  Your Care Instructions    Some diseases and injuries can cause pain that lasts a long time. You don't need to live with uncontrolled pain. A pain management plan helps you find ways to control pain with side effects you can live with. There are things you can do to help with pain. Only you know how much pain you feel. Constant pain can make you depressed. It can cause stress and make it hard for you to eat and sleep. But there are ways to control the pain. They can help you stay active, improve your mood, and heal faster. Your plan can include many types of pain control. You may take prescription or over-the-counter drugs. You can also try physical treatments and behavioral methods. Some medical treatments also help with pain. For example, radiation can be used to reduce pain from bone cancer. You and your doctor will work to make your plan. Be sure to read it often. Change it if your pain is not under control. Follow-up care is a key part of your treatment and safety. Be sure to make and go to all appointments, and call your doctor if you are having problems. It's also a good idea to know your test results and keep a list of the medicines you take. How can you care for yourself at home? Physical treatments  · Be safe with medicines. Take pain medicines exactly as directed. ¨ If the doctor gave you a prescription medicine for pain, take it exactly as prescribed. ¨ If you are not taking a prescription pain medicine, ask your doctor if you can take an over-the-counter medicine. · If your pain medicine causes side effects such as constipation or nausea, you may need to take other medicines for those problems. Talk to your doctor about any side effects you have. · Hot or cold compresses applied directly to the skin can help sore muscles. Put ice or a cold pack on the painful area for 10 to 20 minutes at a time. Put a thin cloth between the ice and your skin. You may find that it helps to switch between cold and heat.  Try a heating pad set on low or a warm cloth on the area for 10 to 15 minutes at a time. · Hydrotherapy uses flowing water to relax muscles. You may want to sit in a hot tub or a steam bath. Or use a shower or a sitz bath to help your pain. · Massage therapy is rubbing the soft tissues of the body. It eases tension and pain. It also improves blood flow and helps you relax. · Transcutaneous electrical nerve stimulation (TENS) uses a gentle electric current applied to the skin for pain relief. You can use TENS at home after you learn how. · Acupuncture is a form of traditional [facility] medicine. It uses very thin needles inserted into certain points of the body. It is done by a person with special training (acupuncturist). · If you get physical therapy, make sure to do any home exercises or stretching your therapist has prescribed. · Stay as active as you can. Try to get some physical activity every day. Behavioral treatments  · Biofeedback teaches you to control a body function that you normally don't think about. These are things like skin temperature, heart rate, and blood pressure. At first, you will use a machine to give you feedback on the function you want to control. Then a therapist will teach you what to do next. Over time, you can stop using the machine. · Breathing techniques can help you relax and get rid of tension. · Guided imagery is a series of thoughts and images that can focus your attention away from your pain. When you do it, you use all your senses to help your body respond as though what you are imagining is real.  · Hypnosis is a state of focused concentration that makes you less aware of your surroundings. It may cause your brain to release chemicals that relieve pain. You can have a therapist help you through hypnosis. Or you can learn to use it on yourself. · Cognitive behavioral therapy is a type of counseling. It helps you change your thought patterns.  Changes in your thoughts can help change your behavior and the way you perceive your body. Other treatments and ideas  · Aromatherapy uses the scent of oils obtained from plants to help you relax or to relieve stress. · Meditation focuses your attention to give a clear awareness of your life. You sit quietly, focus on one image or sound, and breathe deeply. · Yoga uses stretching and exercises (called postures) to reduce stress and improve flexibility and health. · Keep track of your pain in a pain diary. This can help you understand how the things you do affect your pain. · In rare cases, your doctor may advise you to get a nerve block to help with pain. A nerve block is a shot of medicine to interrupt pain signals to your brain. · Some hospitals have special pain clinics or centers. Your doctor may refer you to a pain clinic or center. Or you can ask your doctor about them. Where can you learn more? Go to http://uriel-percy.info/. Enter H570 in the search box to learn more about \"Developing a Pain Management Plan: Care Instructions. \"  Current as of: October 14, 2016  Content Version: 11.4  © 3297-6925 [employer]. Care instructions adapted under license by [employer] (which disclaims liability or warranty for this information). If you have questions about a medical condition or this instruction, always ask your healthcare professional. [address] any warranty or liability for your use of this information.

## 2018-05-01 NOTE — MR AVS SNAPSHOT
[address] [address] 
[phone] Patient: Henry Greer MRN: [id number] CHRISTOPHER:[id number] Visit Information Date & Time Provider Department Dept. Phone Encounter #  
 5/1/2018  3:00 PM Darryl Ramos, [address] [facility] [id number] Follow-up Instructions Return in about 4 weeks (around 5/29/2018), or if symptoms worsen or fail to improve, for 30 min. Upcoming Health Maintenance Date Due  
 EYE EXAM RETINAL OR DILATED Q1 9/13/1977 Pneumococcal 19-64 Medium Risk (1 of 1 - PPSV23) 9/13/1986 MICROALBUMIN Q1 8/31/2016 FOBT Q 1 YEAR AGE 50-75 9/13/2017 HEMOGLOBIN A1C Q6M 11/17/2017 MEDICARE YEARLY EXAM 3/28/2018 FOOT EXAM Q1 5/17/2018 LIPID PANEL Q1 5/17/2018 Influenza Age 5 to Adult 8/1/2018 BREAST CANCER SCRN MAMMOGRAM 3/31/2019 PAP AKA CERVICAL CYTOLOGY 5/17/2020 DTaP/Tdap/Td series (2 - Td) 8/31/2025 Allergies as of 5/1/2018  Review Complete On: 5/1/2018 By: Darryl Ramos DO Severity Noted Reaction Type Reactions Latex  08/26/2014    Rash Lisinopril  06/07/2012   Intolerance Cough Ace Inhibitor - cough Current Immunizations  Reviewed on 8/31/2015 Name Date Influenza Vaccine 9/25/2015, 10/30/2013 Tdap 8/31/2015, 5/22/2014  6:52 PM  
  
 Not reviewed this visit You Were Diagnosed With   
  
 Codes Comments Neck pain    -  Primary ICD-10-CM: M54.2 ICD-9-CM: 723.1 Chronic pain due to trauma     ICD-10-CM: G89.21 ICD-9-CM: 338.21 Lumbosacral radiculitis     ICD-10-CM: M54.17 ICD-9-CM: 724.4 Chronic post-traumatic headache, not intractable     ICD-10-CM: J82.207 ICD-9-CM: 339.22 High risk medication use     ICD-10-CM: Z79.899 ICD-9-CM: V58.69 Vitals BP Pulse Temp Resp Height(growth percentile) Weight(growth percentile) (!) 137/97 (BP 1 Location: Left arm, BP Patient Position: Sitting) 84 97.3 °F (36.3 °C) (Oral) 14 5' 11\" (1.803 m) 210 lb (95.3 kg) LMP BMI OB Status Smoking Status 04/25/2018 29.29 kg/m2 Unknown Former Smoker Vitals History BMI and BSA Data Body Mass Index Body Surface Area  
 29.29 kg/m 2 2.18 m 2 Preferred Pharmacy Pharmacy Name Phone [address] [place] [address]. [phone] Your Updated Medication List  
  
   
This list is accurate as of 5/1/18  5:12 PM.  Always use your most recent med list.  
  
  
  
  
 ACIDOPHILUS 500 million cell Tab Generic drug:  Lactobacillus acidophilus Take  by mouth daily. albuterol 90 mcg/actuation inhaler Commonly known as:  PROVENTIL HFA, VENTOLIN HFA, PROAIR HFA Take 2 Puffs by inhalation every four (4) hours as needed for Wheezing. allopurinol 100 mg tablet Commonly known as:  [place] Take 1 Tab by mouth two (2) times a day. carvedilol 25 mg tablet Commonly known as:  COREG  
TAKE ONE TABLET BY MOUTH TWICE DAILY  
  
 cholecalciferol (VITAMIN D3) 5,000 unit Tab tablet Commonly known as:  VITAMIN D3 Take  by mouth daily. cloNIDine HCl 0.1 mg tablet Commonly known as:  CATAPRES  
TAKE ONE TABLET BY MOUTH TWICE DAILY  
  
 cyanocobalamin 2,000 mcg Tber Take  by mouth daily. docusate sodium 100 mg capsule Commonly known as:  Bk [place] Take 1 Cap by mouth two (2) times daily as needed for Constipation for up to 90 days. glucose blood VI test strips strip Commonly known as:  RELION PRIME TEST STRIPS Use as directed HYDROmorphone 8 mg Tb24 Commonly known [facility] Take 8 mg by mouth daily. Max Daily Amount: 8 mg. For chronic pain  Indications: Chronic Pain with Opioid Tolerance Start taking on:  5/10/2018  
  
 ibuprofen 600 mg tablet Commonly known as:  MOTRIN  
 Take 1 Tab by mouth every six (6) hours as needed for Pain.  
  
 lovastatin 20 mg tablet Commonly known as:  MEVACOR Take 1 Tab by mouth nightly. metFORMIN 1,000 mg tablet Commonly known as:  GLUCOPHAGE Take 1 Tab by mouth two (2) times daily (with meals). multivitamin tablet Commonly known as:  ONE A DAY Take 1 tablet by mouth daily. naloxone 4 mg/actuation nasal spray Commonly known as:  NARCAN  
4 mg by Nasal route as needed for up to 2 doses. Indications: OPIOID TOXICITY  
  
 nortriptyline 50 mg capsule Commonly known as:  PAMELOR Take 1 Cap by mouth nightly for 30 days. ondansetron hcl 8 mg tablet Commonly known as:  Candiss Raveling Take 1 Tab by mouth every eight (8) hours as needed for Nausea. OTHER Motorized Wheelchair  
  
 oxybutynin 5 mg tablet Commonly known as:  DACEYECW Take 1 Tab by mouth two (2) times a day. oxyCODONE IR 15 mg immediate release tablet Commonly known as:  OXY-IR Take 1 Tab by mouth every eight (8) hours as needed for Pain. Max Daily Amount: 45 mg. Start taking on:  5/10/2018  
  
 senna-docusate 8.6-50 mg per tablet Commonly known as:  Adriana Sewell One tab po daily as needed for constipation. spironolactone 25 mg tablet Commonly known as:  ALDACTONE  
TAKE TWO TABLETS BY MOUTH ONCE DAILY  
  
 TENS Units Electrodes 2X2 \" Pads For chronic lumbar and cervical pain. Indications: Chronic Pain VITAMIN C 250 mg tablet Generic drug:  ascorbic acid (vitamin C) Take 250 mg by mouth daily. Prescriptions Printed Refills HYDROmorphone [facility]) 8 mg Tb24 0 Starting on: 5/10/2018 Sig: Take 8 mg by mouth daily. Max Daily Amount: 8 mg. For chronic pain  Indications: Chronic Pain with Opioid Tolerance Class: Print Route: Oral  
 oxyCODONE IR (OXY-IR) 15 mg immediate release tablet 0 Starting on: 5/10/2018 Sig: Take 1 Tab by mouth every eight (8) hours as needed for Pain. Max Daily Amount: 45 mg.  
 Class: Print Route: Oral  
 tens unit electrodes (TENS UNITS ELECTRODES) 2X2 \" pads 0 Sig: For chronic lumbar and cervical pain. Indications: Chronic Pain Class: Print Prescriptions Sent to Pharmacy Refills  
 docusate sodium (COLACE) 100 mg capsule 2 Sig: Take 1 Cap by mouth two (2) times daily as needed for Constipation for up to 90 days. Class: Normal  
 Pharmacy: [facility] DR JAKC LORA [address] [place] [address]. Ph #: [phone] Route: Oral  
 nortriptyline (PAMELOR) 50 mg capsule 0 Sig: Take 1 Cap by mouth nightly for 30 days. Class: Normal  
 Pharmacy: [facility] DR JACK LORA [facility]. Ph #: [phone] Route: Oral  
 senna-docusate (PERICOLACE) 8.6-50 mg per tablet 0 Sig: One tab po daily as needed for constipation. Class: Normal  
 Pharmacy: [facility] DR JACK LORA [address] [place] [address]. Ph #: [phone] We Performed the Following REFERRAL TO ACUPUNCTURE [REF1 Custom] Comments:  
 61-year-old female with history of chronic pain due to trauma including history of TBI, cervical pain, chronic lower back pain. Please evaluate and treat. Thank you Follow-up Instructions Return in about 4 weeks (around 5/29/2018), or if symptoms worsen or fail to improve, for 30 min. To-Do List   
 05/01/2018 Imaging:  XR SPINE LUMB COMP W BEND Referral Information Referral ID Referred By Referred To  
  
 [id number] Nicole ANTOINE Not Available Visits Status Start Date End Date 1 New Request 5/1/18 5/1/19 If your referral has a status of pending review or denied, additional information will be sent to support the outcome of this decision. Patient Instructions Back Pain and Sex: Care Instructions Your Care Instructions It's common to be afraid to have sex when you have back pain. But it doesn't have to stop you from having a satisfying sex life. Talk to your partner about which movements are comfortable for you and which aren't. And if the thought of having pain during sex terrifies you, talk about that too. Follow-up care is a key part of your treatment and safety. Be sure to make and go to all appointments, and call your doctor if you are having problems. It's also a good idea to know your test results and keep a list of the medicines you take. How can you care for yourself at home? · Learn which sexual positions may be good or bad for your back. For example, some back problems cause pain when you bend forward. Others cause problems when you arch your back. · Try positions you've never considered before. You may need to use a firmer surface than your mattress, such as a soft rug on the floor or even a sturdy chair. Oral sex might be easier than intercourse. · Go slow. Sex is like exercise-warming up and stretching first are important. Many people use yoga to gently stretch their muscles. When you're ready to have sex, keep your movements slow and gentle. · Take a hot shower to help relax your muscles. Or have your partner give you a massage. · Increase the time you and your partner spend touching and caressing each other before sex (foreplay). · If it hurts, stop. That may seem obvious, but when things get passionate, it can be hard to stay in control. Try to keep it slow so that you can stop right away if your back starts to hurt. When should you call for help? Watch closely for changes in your health, and be sure to contact your doctor if you have any problems. Where can you learn more? Go to http://uriel-percy.info/. Enter V978 in the search box to learn more about \"Back Pain and Sex: Care Instructions. \" Current as of: March 21, 2017 Content Version: 11.4 © 0460-1181 Healthwise, [employer]. Care instructions adapted under license by [employer] (which disclaims liability or warranty for this information). If you have questions about a medical condition or this instruction, always ask your healthcare professional. [address] any warranty or liability for your use of this information. Learning About Opioids Introduction Opioids are medicines used to relieve moderate to severe pain. They may be used for a short time for pain, such as after surgery. Or in some cases a doctor might prescribe them for long-term pain. They don't cure a health problem. But they help you manage the pain. Opioids relieve pain by changing the way your body feels pain and the way you feel about pain. Sometimes opioids are used for people who can't take other pain medicines. They may be prescribed if you have heart, kidney, or liver problems. For instance, you may take an opioid instead of nonsteroidal anti-inflammatory drugs (NSAIDs). NSAIDs include ibuprofen (Advil, Motrin) and naproxen (Aleve). Opioids are strong medicines. They can help you manage pain when you use them the right way. But if you misuse them, they can cause serious harm and even death. If you decide to take opioids, here are some things to remember. · Keep your doctor informed. You can get addicted to opioids. The risk is higher if you have a history of substance use. Your doctor will monitor you closely for signs of misuse and addiction and to figure out when you no longer need to take opioids. · Make a treatment plan. The goal of your plan is to be able to function and do the things you need to do, even if you still have some pain. You might be able to manage your pain with other non-opioid options like physical therapy, relaxation, or over-the-counter pain medicines. · Be aware of the side effects.  Opioids can cause serious side effects, such as constipation, dry mouth, and nausea. And over time, you may need a higher dose to get pain relief. This is called tolerance. Your body also gets used to opioids. This is called physical dependence. If you suddenly stop taking them, you may have withdrawal symptoms. Examples Opioids or other medicines that contain them include: · Codeine (Tylenol 3). · Hydrocodone (Norco). · Oxycodone (OxyContin, Percocet). Safety tips If you need to take opioids to manage your pain, remember these safety tips. · Follow directions carefully. It's easy to misuse opioids if you take a dose other than what's prescribed by your doctor. This can lead to overdose and even death. Even sharing them with someone they weren't meant for is misuse. · Be cautious. Opioids may affect your judgment and decision making. Do not drive or operate machinery until you can think clearly. Talk with your doctor about when it is safe to drive. · Reduce the risk of drug interactions. Opioids can be dangerous if you take them with alcohol or with certain drugs like sleeping pills and muscle relaxers. Make sure your doctor knows about all the other medicines you take, including over-the-counter medicines. Don't start any new medicines before you talk to your doctor or pharmacist. 
· Keep others safe. Store opioids in a safe and secure place. Make sure that pets, children, friends, and family can't get to them. When you're done using opioids, make sure to properly dispose of them. You can either use a community drug take-back program or your drugstore's mail-back program. If one of these programs isn't available, you can flush opioid skin patches and unused opioid pills down the toilet. · Reduce the risk of overdose. Misuse of opioids can be very dangerous. Protect yourself by asking your doctor about a naloxone rescue kit. It can help you-and even save your life-if you take too much of an opioid. Side effects Common side effects include: · Constipation. · Feeling dizzy or lightheaded. You may feel like you might faint. · Feeling sleepy. · Nausea or vomiting. You may have other side effects or reactions. Check the information that comes with your medicine. When should you call for help? Call 911 anytime you think you may need emergency care. For example, call if: 
· You have symptoms of a severe allergic reaction. These may include: 
¨ Sudden raised, red areas (hives) all over your body. ¨ Swelling of the throat, mouth, lips, or tongue. ¨ Trouble breathing. ¨ Passing out (losing consciousness). Or you may feel very lightheaded or suddenly feel weak, confused, or restless. · You have signs of an overdose. These include: ¨ Cold, clammy skin. ¨ Confusion. ¨ Severe nervousness or restlessness. ¨ Severe dizziness, drowsiness, or weakness. ¨ Slow breathing. ¨ Seizures. Call your doctor now or seek immediate medical care if: 
· You have symptoms of an allergic reaction, such as: ¨ A rash or hives (raised, red areas on the skin). ¨ Itching. ¨ Swelling. ¨ Belly pain, nausea, or vomiting. Watch closely for changes in your health, and be sure to contact your doctor if: 
· Your medicine is not helping with the pain. · You are having side effects, such as constipation. Where can you learn more? Go to http://uriel-percy.info/. Enter V745 in the search box to learn more about \"Learning About Opioids. \" Current as of: October 14, 2016 Content Version: 11.4 © 0505-8290 [employer]. Care instructions adapted under license by [employer] (which disclaims liability or warranty for this information). If you have questions about a medical condition or this instruction, always ask your healthcare professional. [address] [address] any warranty or liability for your use of this information. Developing a Pain Management Plan: Care Instructions Your Care Instructions Some diseases and injuries can cause pain that lasts a long time. You don't need to live with uncontrolled pain. A pain management plan helps you find ways to control pain with side effects you can live with. There are things you can do to help with pain. Only you know how much pain you feel. Constant pain can make you depressed. It can cause stress and make it hard for you to eat and sleep. But there are ways to control the pain. They can help you stay active, improve your mood, and heal faster. Your plan can include many types of pain control. You may take prescription or over-the-counter drugs. You can also try physical treatments and behavioral methods. Some medical treatments also help with pain. For example, radiation can be used to reduce pain from bone cancer. You and your doctor will work to make your plan. Be sure to read it often. Change it if your pain is not under control. Follow-up care is a key part of your treatment and safety. Be sure to make and go to all appointments, and call your doctor if you are having problems. It's also a good idea to know your test results and keep a list of the medicines you take. How can you care for yourself at home? Physical treatments · Be safe with medicines. Take pain medicines exactly as directed. ¨ If the doctor gave you a prescription medicine for pain, take it exactly as prescribed. ¨ If you are not taking a prescription pain medicine, ask your doctor if you can take an over-the-counter medicine. · If your pain medicine causes side effects such as constipation or nausea, you may need to take other medicines for those problems. Talk to your doctor about any side effects you have. · Hot or cold compresses applied directly to the skin can help sore muscles. Put ice or a cold pack on the painful area for 10 to 20 minutes at a time. Put a thin cloth between the ice and your skin.  You may find that it helps to switch between cold and heat. Try a heating pad set on low or a warm cloth on the area for 10 to 15 minutes at a time. · Hydrotherapy uses flowing water to relax muscles. You may want to sit in a hot tub or a steam bath. Or use a shower or a sitz bath to help your pain. · Massage therapy is rubbing the soft tissues of the body. It eases tension and pain. It also improves blood flow and helps you relax. · Transcutaneous electrical nerve stimulation (TENS) uses a gentle electric current applied to the skin for pain relief. You can use TENS at home after you learn how. · Acupuncture is a form of traditional [facility] medicine. It uses very thin needles inserted into certain points of the body. It is done by a person with special training (acupuncturist). · If you get physical therapy, make sure to do any home exercises or stretching your therapist has prescribed. · Stay as active as you can. Try to get some physical activity every day. Behavioral treatments · Biofeedback teaches you to control a body function that you normally don't think about. These are things like skin temperature, heart rate, and blood pressure. At first, you will use a machine to give you feedback on the function you want to control. Then a therapist will teach you what to do next. Over time, you can stop using the machine. · Breathing techniques can help you relax and get rid of tension. · Guided imagery is a series of thoughts and images that can focus your attention away from your pain. When you do it, you use all your senses to help your body respond as though what you are imagining is real. 
· Hypnosis is a state of focused concentration that makes you less aware of your surroundings. It may cause your brain to release chemicals that relieve pain. You can have a therapist help you through hypnosis. Or you can learn to use it on yourself. · Cognitive behavioral therapy is a type of counseling.  It helps you change your thought patterns. Changes in your thoughts can help change your behavior and the way you perceive your body. Other treatments and ideas · Aromatherapy uses the scent of oils obtained from plants to help you relax or to relieve stress. · Meditation focuses your attention to give a clear awareness of your life. You sit quietly, focus on one image or sound, and breathe deeply. · Yoga uses stretching and exercises (called postures) to reduce stress and improve flexibility and health. · Keep track of your pain in a pain diary. This can help you understand how the things you do affect your pain. · In rare cases, your doctor may advise you to get a nerve block to help with pain. A nerve block is a shot of medicine to interrupt pain signals to your brain. · Some hospitals have special pain clinics or centers. Your doctor may refer you to a pain clinic or center. Or you can ask your doctor about them. Where can you learn more? Go to http://uriel-percy.info/. Enter Q653 in the search box to learn more about \"Developing a Pain Management Plan: Care Instructions. \" Current as of: October 14, 2016 Content Version: 11.4 © 5340-8904 [employer]. Care instructions adapted under license by [employer] (which disclaims liability or warranty for this information). If you have questions about a medical condition or this instruction, always ask your healthcare professional. [address] any warranty or liability for your use of this information. Introducing [facility]! Vincenzo Marlow introduces [employer] patient portal. Now you can access parts of your medical record, email your doctor's office, and request medication refills online. 1. In your internet browser, go to [email or web address] 2. Click on the First Time User? Click Here link in the Sign In box. You will see the New Member Sign Up page. 3. Enter your HitchedPic Access Code exactly as it appears below. You will not need to use this code after youve completed the sign-up process. If you do not sign up before the expiration date, you must request a new code. · HitchedPic Access Code: [email or web address] Expires: 7/30/2018  4:49 PM 
 
4. Enter the last four digits of your Social Security Number (xxxx) and Date of Birth (mm/dd/yyyy) as indicated and click Submit. You will be taken to the next sign-up page. 5. Create a HitchedPic ID. This will be your HitchedPic login ID and cannot be changed, so think of one that is secure and easy to remember. 6. Create a HitchedPic password. You can change your password at any time. 7. Enter your Password Reset Question and Answer. This can be used at a later time if you forget your password. 8. Enter your e-mail address. You will receive e-mail notification when new information is available in [address]. 9. Click Sign Up. You can now view and download portions of your medical record. 10. Click the Download Summary menu link to download a portable copy of your medical information. If you have questions, please visit the Frequently Asked Questions section of the HitchedPic website. Remember, HitchedPic is NOT to be used for urgent needs. For medical emergencies, dial 911. Now available from your iPhone and Android! Please provide this summary of care documentation to your next provider. Your primary care clinician is listed as [address]. If you have any questions after today's visit, please call [phone].

## 2018-05-04 ENCOUNTER — DOCUMENTATION ONLY (OUTPATIENT)
Dept: PAIN MANAGEMENT | Age: 53
End: 2018-05-04

## 2018-05-04 NOTE — PROGRESS NOTES
The pt's order for a tens unit and necessary supplies, demographics, and last office note were faxed over to [facility]. A fax confirmation was received and they will contact the pt.

## 2018-05-07 ENCOUNTER — DOCUMENTATION ONLY (OUTPATIENT)
Dept: PAIN MANAGEMENT | Age: 53
End: 2018-05-07

## 2018-05-07 NOTE — PROGRESS NOTES
The office received a request for a letter of medical necessity for the pt's tens unit. The form was reviewed and signed by Dr. Sharon Ortega. Dedra Schilling. The form was faxed back over to [facility] as requested and a fax confirmation was received.  Form will be sent to scanning for pt's record

## 2018-06-07 ENCOUNTER — OFFICE VISIT (OUTPATIENT)
Dept: PAIN MANAGEMENT | Age: 53
End: 2018-06-07

## 2018-06-07 VITALS
RESPIRATION RATE: 13 BRPM | WEIGHT: 210 LBS | HEIGHT: 71 IN | TEMPERATURE: 97 F | BODY MASS INDEX: 29.4 KG/M2 | HEART RATE: 68 BPM | SYSTOLIC BLOOD PRESSURE: 141 MMHG | DIASTOLIC BLOOD PRESSURE: 91 MMHG

## 2018-06-07 DIAGNOSIS — M54.2 NECK PAIN: ICD-10-CM

## 2018-06-07 DIAGNOSIS — M54.5 LOW BACK PAIN, UNSPECIFIED BACK PAIN LATERALITY, UNSPECIFIED CHRONICITY, WITH SCIATICA PRESENCE UNSPECIFIED: ICD-10-CM

## 2018-06-07 DIAGNOSIS — Z79.899 HIGH RISK MEDICATION USE: ICD-10-CM

## 2018-06-07 DIAGNOSIS — M51.37 OTHER INTERVERTEBRAL DISC DEGENERATION, LUMBOSACRAL REGION: ICD-10-CM

## 2018-06-07 DIAGNOSIS — G89.21 CHRONIC PAIN DUE TO TRAUMA: ICD-10-CM

## 2018-06-07 DIAGNOSIS — M54.17 LUMBOSACRAL RADICULITIS: ICD-10-CM

## 2018-06-07 DIAGNOSIS — M50.30 OTHER CERVICAL DISC DEGENERATION, UNSPECIFIED CERVICAL REGION: Primary | ICD-10-CM

## 2018-06-07 DIAGNOSIS — R47.1 DYSARTHRIA: ICD-10-CM

## 2018-06-07 RX ORDER — IBUPROFEN 600 MG/1
600 TABLET ORAL
Qty: 60 TAB | Refills: 3 | Status: SHIPPED | OUTPATIENT
Start: 2018-06-07 | End: 2018-07-07

## 2018-06-07 RX ORDER — OXYCODONE HYDROCHLORIDE 15 MG/1
15 TABLET ORAL
Qty: 90 TAB | Refills: 0 | Status: SHIPPED | OUTPATIENT
Start: 2018-06-07 | End: 2018-07-07

## 2018-06-07 RX ORDER — ACETAMINOPHEN 500 MG
1000 TABLET ORAL
Qty: 200 TAB | Refills: 2 | Status: SHIPPED | OUTPATIENT
Start: 2018-06-07 | End: 2018-08-10

## 2018-06-07 RX ORDER — HYDROMORPHONE HYDROCHLORIDE 8 MG/1
8 TABLET, EXTENDED RELEASE ORAL DAILY
Qty: 30 TAB | Refills: 0 | Status: SHIPPED | OUTPATIENT
Start: 2018-06-07 | End: 2018-07-07

## 2018-06-07 NOTE — PATIENT INSTRUCTIONS
Acute High Blood Pressure: Care Instructions  Your Care Instructions    Acute high blood pressure is very high blood pressure. It's a serious problem. Very high blood pressure can damage your brain, heart, eyes, and kidneys. You may have been given medicines to lower your blood pressure. You may have gotten them as pills or through a needle in one of your veins. This is called an IV. And maybe you were given other medicines too. These can be needed when high blood pressure causes other problems. To keep your blood pressure at a lower level, you may need to make healthy lifestyle changes. And you will probably need to take medicines. Be sure to follow up with your doctor about your blood pressure and what you can do about it. Follow-up care is a key part of your treatment and safety. Be sure to make and go to all appointments, and call your doctor if you are having problems. It's also a good idea to know your test results and keep a list of the medicines you take. How can you care for yourself at home? · See your doctor as often as he or she recommends. This is to make sure your blood pressure is under control. You will probably go at least 2 times a year. But it may be more often at first.  · Take your blood pressure medicine exactly as prescribed. You may take one or more types. They include diuretics, beta-blockers, ACE inhibitors, calcium channel blockers, and angiotensin II receptor blockers. Call your doctor if you think you are having a problem with your medicine. · If you take blood pressure medicine, talk to your doctor before you take decongestants or anti-inflammatory medicine, such as ibuprofen. These can raise blood pressure. · Learn how to check your blood pressure at home. Check it often. · Ask your doctor if it's okay to drink alcohol. · Talk to your doctor about lifestyle changes that can help blood pressure. These include being active and not smoking.   When should you call for help?  Call 911 anytime you think you may need emergency care. This may mean having symptoms that suggest that your blood pressure is causing a serious heart or blood vessel problem. Your blood pressure may be over 180/110. ? For example, call 911 if:  ? · You have symptoms of a heart attack. These may include:  ¨ Chest pain or pressure, or a strange feeling in the chest.  ¨ Sweating. ¨ Shortness of breath. ¨ Nausea or vomiting. ¨ Pain, pressure, or a strange feeling in the back, neck, jaw, or upper belly or in one or both shoulders or arms. ¨ Lightheadedness or sudden weakness. ¨ A fast or irregular heartbeat. ? · You have symptoms of a stroke. These may include:  ¨ Sudden numbness, tingling, weakness, or loss of movement in your face, arm, or leg, especially on only one side of your body. ¨ Sudden vision changes. ¨ Sudden trouble speaking. ¨ Sudden confusion or trouble understanding simple statements. ¨ Sudden problems with walking or balance. ¨ A sudden, severe headache that is different from past headaches. ? · You have severe back or belly pain. ?Do not wait until your blood pressure comes down on its own. Get help right away. ?Call your doctor now or seek immediate care if:  ? · Your blood pressure is much higher than normal (such as 180/110 or higher), but you don't have symptoms. ? · You think high blood pressure is causing symptoms, such as:  ¨ Severe headache. ¨ Blurry vision. ? Watch closely for changes in your health, and be sure to contact your doctor if:  ? · Your blood pressure measures 140/90 or higher at least 2 times. That means the top number is 140 or higher or the bottom number is 90 or higher, or both. ? · You think you may be having side effects from your blood pressure medicine. ? · Your blood pressure is usually normal, but it goes above normal at least 2 times. Where can you learn more? Go to http://uriel-percy.info/.   Enter Z207 in the search box to learn more about \"Acute High Blood Pressure: Care Instructions. \"  Current as of: September 21, 2016  Content Version: 11.4  © 2158-5771 [employer]. Care instructions adapted under license by [employer] (which disclaims liability or warranty for this information). If you have questions about a medical condition or this instruction, always ask your healthcare professional. [address] any warranty or liability for your use of this information. Safe Use of Opioid Pain Medicine: Care Instructions  Your Care Instructions  Pain is your body's way of warning you that something is wrong. Pain feels different for everybody. Only you can describe your pain. A doctor can suggest or prescribe many types of medicines for pain. These range from over-the-counter medicines like acetaminophen (Tylenol) to powerful medicines called opioids. Examples of opioids are fentanyl, hydrocodone, morphine, and oxycodone. Heroin is an illegal opioid  Opioids are strong medicines. They can help you manage pain when you use them the right way. But if you misuse them, they can cause serious harm and even death. For these reasons, doctors are very careful about how they prescribe opioids. If you decide to take opioids, here are some things to remember. · Keep your doctor informed. You can get addicted to opioids. The risk is higher if you have a history of substance use. Your doctor will monitor you closely for signs of misuse and addiction and to figure out when you no longer need to take opioids. · Make a treatment plan. The goal of your plan is to be able to function and do the things you need to do, even if you still have some pain. You might be able to manage your pain with other non-opioid options like physical therapy, relaxation, or over-the-counter pain medicines. · Be aware of the side effects.  Opioids can cause serious side effects, such as constipation, dry mouth, and nausea. And over time, you may need a higher dose to get pain relief. This is called tolerance. Your body also gets used to opioids. This is called physical dependence. If you suddenly stop taking them, you may have withdrawal symptoms. The doctor carefully considered what pain medicine is right for you. You may not have received opioids if your doctor was concerned about drug interactions or your safety, or if he or she had other concerns. It is best to have one doctor or clinic treat your pain. This way you will get the pain medicine that will help you the most. And a doctor will be able to watch for any problems that the medicine might cause. The doctor has checked you carefully, but problems can develop later. If you notice any problems or new symptoms,  get medical treatment right away. Follow-up care is a key part of your treatment and safety. Be sure to make and go to all appointments, and call your doctor if you are having problems. It's also a good idea to know your test results and keep a list of the medicines you take. How can you care for yourself at home? · If you need to take opioids to manage your pain, remember these safety tips. ¨ Follow directions carefully. It's easy to misuse opioids if you take a dose other than what's prescribed by your doctor. This can lead to overdose and even death. Even sharing them with someone they weren't meant for is misuse. ¨ Be cautious. Opioids may affect your judgment and decision making. Do not drive or operate machinery until you can think clearly. Talk with your doctor about when it is safe to drive. ¨ Reduce the risk of drug interactions. Opioids can be dangerous if you take them with alcohol or with certain drugs like sleeping pills and muscle relaxers. Make sure your doctor knows about all the other medicines you take, including over-the-counter medicines.  Don't start any new medicines before you talk to your doctor or pharmacist.  Kimi Taylor Keep others safe. Store opioids in a safe and secure place. Make sure that pets, children, friends, and family can't get to them. When you're done using opioids, make sure to properly dispose of them. You can either use a community drug take-back program or your drugstore's mail-back program. If one of these programs isn't available, you can flush opioid skin patches and unused opioid pills down the toilet. ¨ Reduce the risk of overdose. Misuse of opioids can be very dangerous. Protect yourself by asking your doctor about a naloxone rescue kit. It can help you-and even save your life-if you take too much of an opioid. · Try other ways to reduce pain. ¨ Relax, and reduce stress. Relaxation techniques such as deep breathing or meditation can help. ¨ Keep moving. Gentle, daily exercise can help reduce pain over the long run. Try low- or no-impact exercises such as walking, swimming, and stationary biking. Do stretches to stay flexible. ¨ Try heat, cold packs, and massage. ¨ Get enough sleep. Pain can make you tired and drain your energy. Talk with your doctor if you have trouble sleeping because of pain. ¨ Think positive. Your thoughts can affect your pain level. Do things that you enjoy to distract yourself when you have pain instead of focusing on the pain. See a movie, read a book, listen to music, or spend time with a friend. · If you are not taking a prescription pain medicine, ask your doctor if you can take an over-the-counter medicine. When should you call for help? Call your doctor now or seek immediate medical care if:  ? · You have a new kind of pain. ? · You have new symptoms, such as a fever or rash, along with the pain. ? Watch closely for changes in your health, and be sure to contact your doctor if:  ? · You think you might be using too much pain medicine, and you need help to use less or stop. ? · Your pain gets worse.    ? · You would like a referral to a doctor or clinic that specializes in pain management. Where can you learn more? Go to http://uriel-percy.info/. Enter R108 in the search box to learn more about \"Safe Use of Opioid Pain Medicine: Care Instructions. \"  Current as of: October 14, 2016  Content Version: 11.4  © 6818-8137 [employer]. Care instructions adapted under license by [employer] (which disclaims liability or warranty for this information). If you have questions about a medical condition or this instruction, always ask your healthcare professional. [place] [address] any warranty or liability for your use of this information.

## 2018-06-07 NOTE — PROGRESS NOTES
Referral  source Worker's Compensation and question low back pain and neck pain. HPI:  Rhina Davila is a 50 y.o. female here for f/u visit for medication refill and updates on the plan of care for chronic neck and back pain. Pt was last seen here on May 1, 2018. Pt denies interval changes on the character or distribution of pain although she is reporting increased intensity secondary to opioid medication changes at last visit. Pain is located Across the lumbosacral bands bilaterally with radiating symptoms from the medial thigh to the dorsal toes and posterior medial thigh to the plantar foot and plantar surface of the toes. She has cervical and bilateral upper trapezius pain as well. The pain is described as stabbing burning with pins and needles. The dysesthesias are mostly isolated to the right side of the body. The pain is worsened by anything especially increased activity levels. Symptoms are relieved by opioid medications. History of significant TBI at onset of these symptoms. Patient treats with neurology. Following her previous visit her neurologist was contacted and patient was instructed to follow-up with neurologist soon as possible for further assessment. Patient did not contact the neurologist so follow-up did not happen. Lumbar spine x-rays were requested previous visit the patient did not get the study done. TENS unit was requested last visit patient has been unable to obtain. Nortriptyline was refilled previous visit patient did not obtain it. Patient was referred for acupuncture treatment at previous visit but she did not follow through. Patient expresses discontent stating that she did not understand the plan to wean the opioids at last visit which was approximately 2 hours in duration. Patient feels like the initial reduction was too much. Denies bowel bladder incontinence but has urinary retention. Denies any new numbness tingling.   Denies any new weakness    Social History     Social History    Marital status:      Spouse name: N/A    Number of children: N/A    Years of education: N/A     Occupational History    nurse assistant      Social History Main Topics    Smoking status: Former Smoker     Types: Cigarettes    Smokeless tobacco: Never Used      Comment: Smoke for 26 years    Alcohol use No    Drug use: No    Sexual activity: Yes     Partners: Male     Birth control/ protection: None     Other Topics Concern    Not on file     Social History Narrative     Family History   Problem Relation Age of Onset    Heart Attack Mother     Heart Disease Mother     Diabetes Mother     Pacemaker Mother     Stroke Father     Cancer Father     Hypertension Maternal Grandmother     Heart Attack Maternal Grandmother     Diabetes Maternal Grandmother     Cancer Maternal Grandmother     Hypertension Maternal Grandfather     Diabetes Maternal Grandfather     Hypertension Paternal Grandmother     Diabetes Paternal Grandmother     Breast Cancer Paternal Grandmother     Hypertension Paternal Grandfather     Diabetes Paternal Grandfather     Heart Attack Sister     Breast Cancer Maternal Aunt     Cancer Maternal Aunt     Breast Cancer Paternal Aunt     Cancer Maternal Uncle     Cancer Niece      Allergies   Allergen Reactions    Latex Rash    Lisinopril Cough     Ace Inhibitor - cough     Past Medical History:   Diagnosis Date    Abnormal nuclear cardiac imaging test 07/10/2012    Mod mid to distal anterior septal ischemia. EF 47%. Mid to distal anterior septal hypk. Normal EKG portion of stress test.  Intermediate high risk.     Asthma     Back pain     injury at work June 2014    Coronary atherosclerosis of native coronary artery     angiographically normal coronaries with dLAD bridging (july 2012)    Diabetes mellitus type II, controlled ([address]     Forgetfulness     since injury June 2014    Fracture of left humerus     GERD (gastroesophageal reflux disease)     Gout     Headache(784.0)     Hypertension     Lack of coordination     since injury June 2014    Neck pain     injury at work June 2014    Obesity     S/P cardiac cath 07/10/2012    dLAD w/mild to mod bridging. Otherwise patent coronaries. At least mod LVH. EF 65%. Past Surgical History:   Procedure Laterality Date    HX APPENDECTOMY      HX GI      Gallbladder    HX HEART CATHETERIZATION  7/10/2012    HX ORTHOPAEDIC      left arm     Current Outpatient Prescriptions on File Prior to Visit   Medication Sig    carvedilol (COREG) 25 mg tablet TAKE ONE TABLET BY MOUTH TWICE DAILY    cloNIDine HCl (CATAPRES) 0.1 mg tablet TAKE ONE TABLET BY MOUTH TWICE DAILY    spironolactone (ALDACTONE) 25 mg tablet TAKE TWO TABLETS BY MOUTH ONCE DAILY (Patient taking differently: 25 mg. TAKE TWO TABLETS BY MOUTH ONCE DAILY)    allopurinol (ZYLOPRIM) 100 mg tablet Take 1 Tab by mouth two (2) times a day.  oxybutynin (DITROPAN) 5 mg tablet Take 1 Tab by mouth two (2) times a day.  glucose blood VI test strips (RELION PRIME TEST STRIPS) strip Use as directed    albuterol (PROVENTIL HFA, VENTOLIN HFA, PROAIR HFA) 90 mcg/actuation inhaler Take 2 Puffs by inhalation every four (4) hours as needed for Wheezing.  OTHER Motorized Wheelchair    multivitamin (ONE A DAY) tablet Take 1 tablet by mouth daily.  TENS Units (TENS 504) gee Please provide patient with a TENS unit to use on her lumbosacral region and her cervicothoracic region for chronic pain issues. Indications: For chronic lumbar and cervical pain. Indications: Chronic Pain    docusate sodium (COLACE) 100 mg capsule Take 1 Cap by mouth two (2) times daily as needed for Constipation for up to 90 days.  senna-docusate (PERICOLACE) 8.6-50 mg per tablet One tab po daily as needed for constipation.  tens unit electrodes (TENS UNITS ELECTRODES) 2X2 \" pads For chronic lumbar and cervical pain.   Indications: Chronic Pain    metFORMIN (GLUCOPHAGE) 1,000 mg tablet Take 1 Tab by mouth two (2) times daily (with meals).  lovastatin (MEVACOR) 20 mg tablet Take 1 Tab by mouth nightly.  naloxone 4 mg/actuation spry 4 mg by Nasal route as needed for up to 2 doses. Indications: OPIOID TOXICITY    ondansetron hcl (ZOFRAN, AS HYDROCHLORIDE,) 8 mg tablet Take 1 Tab by mouth every eight (8) hours as needed for Nausea.  ascorbic acid (VITAMIN C) 250 mg tablet Take 250 mg by mouth daily.  Cholecalciferol, Vitamin D3, 5,000 unit Tab Take  by mouth daily.  cyanocobalamin 2,000 mcg TbER Take  by mouth daily.  lactobacillus acidophilus (ACIDOPHILUS) 500 million cell Tab Take  by mouth daily. No current facility-administered medications on file prior to visit. ROS:  Denies fever, chills, nausea, vomiting, diarrhea, constipation, chest pain, trouble breathing, suicidal ideation, homicidal ideation, changes in appetite,, unexplained weight gain or weight loss, recent falls. Vitals:    06/07/18 1519   BP: (!) 141/91   Pulse: 68   Resp: 13   Temp: 97 °F (36.1 °C)   TempSrc: Oral   Weight: 95.3 kg (210 lb)   Height: 5' 11\" (1.803 m)   PainSc:   7          PE:  AFVSS with slightly elevated blood pressure, no acute distress, overweight. A&OXs 3. Normal phonation, no dysphasia noted. Positive dysarthria. Conjugate gaze, clear sclerae. There is mild left-sided facial droop inferior face. Mood borderline combative at times, patient otherwise cooperative. Calculated MEQ - 138 at initiation of wean, today 99.5  Naloxone rescue - yes  Prophylactic bowel program - yes  Date of last OCA needs update.    Last UDS due  , date checked today , findingsconsistent    Primary Care Physician  Rand Eldridge  [address] [place] [address]  [phone]      FLORENTINO - 70%<<62%    COMM- 8<<6    PHQ -2- neg  PHQ over the last two weeks 6/7/2018   Little interest or pleasure in doing things Not at all   Feeling down, depressed or hopeless Not at all   Total Score PHQ 2 0   Trouble falling or staying asleep, or sleeping too much -   Feeling tired or having little energy -   Poor appetite or overeating -   Feeling bad about yourself - or that you are a failure or have let yourself or your family down -   Trouble concentrating on things such as school, work, reading or watching TV -   Moving or speaking so slowly that other people could have noticed; or the opposite being so fidgety that others notice -   Thoughts of being better off dead, or hurting yourself in some way -   PHQ 9 Score -   How difficult have these problems made it for you to do your work, take care of your home and get along with others -       Assessment/Plan:     ICD-10-CM ICD-9-CM    1. Other cervical disc degeneration, unspecified cervical region M50.30 722.4 acetaminophen (TYLENOL) 500 mg tablet      HYDROmorphone (EXALGO ER) 8 mg Tb24      oxyCODONE IR (OXY-IR) 15 mg immediate release tablet      ibuprofen (MOTRIN) 600 mg tablet   2. Other intervertebral disc degeneration, lumbosacral region M51.37 722.52 acetaminophen (TYLENOL) 500 mg tablet      oxyCODONE IR (OXY-IR) 15 mg immediate release tablet      ibuprofen (MOTRIN) 600 mg tablet   3. Low back pain, unspecified back pain laterality, unspecified chronicity, with sciatica presence unspecified M54.5 724.2 acetaminophen (TYLENOL) 500 mg tablet      HYDROmorphone (EXALGO ER) 8 mg Tb24      oxyCODONE IR (OXY-IR) 15 mg immediate release tablet      ibuprofen (MOTRIN) 600 mg tablet   4. High risk medication use Z79.899 V58.69    5. Neck pain M54.2 723.1 HYDROmorphone (EXALGO ER) 8 mg Tb24      oxyCODONE IR (OXY-IR) 15 mg immediate release tablet   6. Chronic pain due to trauma G89.21 338.21    7. Lumbosacral radiculitis M54.17 724.4    8. Dysarthria R47.1 784.51         Plan of care from previous visit is still intact.   It is quoted as follows,\"\"Patient with widespread chronic pain especially in the lumbar region and cervicothoracic region. Patient is counseled today on goals of treatment for maximizing function and improving quality of life while hopefully easing suffering from chronic pain. She stated understanding. Part of this approach is to expand the plan of care as much as possible to minimize opioid medications. She has multiple risk factors complicating potential treatment with long-term opioids for chronic pain to include but not limited to:  Subjective reports memory deficits, obesity, asthma, polypharmacy,, long-term opioid therapy, depression, TBI. \"\"    Pt expressed multiple times her discontent with the POC and was encouraged that we would assist her in finding her previous physicians to help her maintain continuity of care but she refused each time. She was again educated on plan to wean the opioids and that we will continue to address her chronic pain concurrently. aso ensured that if she has difficulty with the wean or with opioid cravings that she will be referred to behavioral health for ongoing evaluation and treatment for OUD while we continue to optimize her POC for the chronic pain. Secondary to the reported poor tolerance of the initial portion of the opioid wean and patient's report of misunderstanding of the plan will maintain current dosage as hydromorphone extended release 8 mg daily  and maintain oxycodone immediate release 15 mg to be used up to 3 times daily as needed for chronic pain.     -Obtain requested imaging  -Continue attempts to obtain TENS unit  -Patient provided with Tylenol and Motrin to augment the complete plan of care  -Follow-up with neurology as soon as possible-  -initiate acupuncture treatment  -Consider need for ongoing speech therapy treatment for dysarthria without dysphasia and subjective reports of dysphagia.       GOALS:  To establish complementary and integrative plan of care to address chronic pain issues while minimizing pharmaceuticals to maximize patient's function improve quality of life. Education:  Patient again educated on the importance of strict compliance with the opioid care agreement while on opioid therapy. Patient also again educated that they should avoid driving while on chronic opioid therapy. Also advised to avoid alcohol and to avoid benzodiazepines while on opioid therapy. Patient Homework:  Continue to independently investigate yoga for persons with chronic pain. F/u:. Follow-up Disposition:  Return in about 4 weeks (around 7/5/2018) for 30 min.

## 2018-06-07 NOTE — MR AVS SNAPSHOT
[address] [address] 
[phone] Patient: Randall Del Toro MRN: [id number] FXO:[id number] Visit Information Date & Time Provider Department Dept. Phone Encounter #  
 6/7/2018  3:30 PM Steve Moura, [facility] [phone] Follow-up Instructions Return in about 4 weeks (around 7/5/2018) for 30 min. Upcoming Health Maintenance Date Due  
 EYE EXAM RETINAL OR DILATED Q1 9/13/1977 Pneumococcal 19-64 Medium Risk (1 of 1 - PPSV23) 9/13/1986 MICROALBUMIN Q1 8/31/2016 FOBT Q 1 YEAR AGE 50-75 9/13/2017 HEMOGLOBIN A1C Q6M 11/17/2017 MEDICARE YEARLY EXAM 3/28/2018 FOOT EXAM Q1 5/17/2018 LIPID PANEL Q1 5/17/2018 Influenza Age 5 to Adult 8/1/2018 BREAST CANCER SCRN MAMMOGRAM 3/31/2019 PAP AKA CERVICAL CYTOLOGY 5/17/2020 DTaP/Tdap/Td series (2 - Td) 8/31/2025 Allergies as of 6/7/2018  Review Complete On: 6/7/2018 By: Steve Moura,  Severity Noted Reaction Type Reactions Latex  08/26/2014    Rash Lisinopril  06/07/2012   Intolerance Cough Ace Inhibitor - cough Current Immunizations  Reviewed on 8/31/2015 Name Date Influenza Vaccine 9/25/2015, 10/30/2013 Tdap 8/31/2015, 5/22/2014  6:52 PM  
  
 Not reviewed this visit You Were Diagnosed With   
  
 Codes Comments Other cervical disc degeneration, unspecified cervical region    -  Primary ICD-10-CM: M50.30 ICD-9-CM: 722.4 Other intervertebral disc degeneration, lumbosacral region     ICD-10-CM: M51.37 
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   
 Lumbosacral radiculitis     ICD-10-CM: M54.17 ICD-9-CM: 724.4 Vitals BP Pulse Temp Resp Height(growth percentile) Weight(growth percentile) (!) 141/91 (BP 1 Location: Left arm, BP Patient Position: Sitting) 68 97 °F (36.1 °C) (Oral) 13 5' 11\" (1.803 m) 210 lb (95.3 kg) BMI OB Status Smoking Status 29.29 kg/m2 Unknown Former Smoker Vitals History BMI and BSA Data Body Mass Index Body Surface Area  
 29.29 kg/m 2 2.18 m 2 Preferred Pharmacy Pharmacy Name Phone [facility] [address] [place] [address]. [phone] Your Updated Medication List  
  
   
This list is accurate as of 6/7/18  4:46 PM.  Always use your most recent med list.  
  
  
  
  
 acetaminophen 500 mg tablet Commonly known as:  TYLENOL Take 2 Tabs by mouth three (3) times daily as needed for Pain (1-2 tabs tid prn pain) for up to 90 days. Indications: BACK PAIN, Pain ACIDOPHILUS 500 million cell Tab Generic drug:  Lactobacillus acidophilus Take  by mouth daily. albuterol 90 mcg/actuation inhaler Commonly known as:  PROVENTIL HFA, VENTOLIN HFA, PROAIR HFA Take 2 Puffs by inhalation every four (4) hours as needed for Wheezing. allopurinol 100 mg tablet Commonly known as:  Kar Merles Take 1 Tab by mouth two (2) times a day. carvedilol 25 mg tablet Commonly known as:  COREG  
TAKE ONE TABLET BY MOUTH TWICE DAILY  
  
 cholecalciferol (VITAMIN D3) 5,000 unit Tab tablet Commonly known as:  VITAMIN D3 Take  by mouth daily. cloNIDine HCl 0.1 mg tablet Commonly known as:  CATAPRES  
TAKE ONE TABLET BY MOUTH TWICE DAILY  
  
 cyanocobalamin 2,000 mcg Tber Take  by mouth daily. docusate sodium 100 mg capsule Commonly known as:  Michael Lints Take 1 Cap by mouth two (2) times daily as needed for Constipation for up to 90 days. glucose blood VI test strips strip Commonly known as:  RELION PRIME TEST STRIPS  
 Use as directed HYDROmorphone 8 mg Tb24 Commonly known asBarbie Antu ER Take 8 mg by mouth daily for 30 days. Max Daily Amount: 8 mg. For chronic pain  Indications: Chronic Pain with Opioid Tolerance  
  
 ibuprofen 600 mg tablet Commonly known as:  MOTRIN Take 1 Tab by mouth every six (6) hours as needed for Pain for up to 30 days. Indications: Pain  
  
 lovastatin 20 mg tablet Commonly known as:  MEVACOR Take 1 Tab by mouth nightly. metFORMIN 1,000 mg tablet Commonly known as:  GLUCOPHAGE Take 1 Tab by mouth two (2) times daily (with meals). multivitamin tablet Commonly known as:  ONE A DAY Take 1 tablet by mouth daily. naloxone 4 mg/actuation nasal spray Commonly known as:  NARCAN  
4 mg by Nasal route as needed for up to 2 doses. Indications: OPIOID TOXICITY  
  
 ondansetron hcl 8 mg tablet Commonly known as:  Blaise Mighty Take 1 Tab by mouth every eight (8) hours as needed for Nausea. OTHER Motorized Wheelchair  
  
 oxybutynin 5 mg tablet Commonly known as:  LPQOEDPL Take 1 Tab by mouth two (2) times a day. oxyCODONE IR 15 mg immediate release tablet Commonly known as:  OXY-IR Take 1 Tab by mouth every eight (8) hours as needed for Pain for up to 30 days. Max Daily Amount: 45 mg. Indications: Pain  
  
 senna-docusate 8.6-50 mg per tablet Commonly known as:  Jyl Orf One tab po daily as needed for constipation. spironolactone 25 mg tablet Commonly known as:  ALDACTONE  
TAKE TWO TABLETS BY MOUTH ONCE DAILY  
  
 TENS Units Clay Bhatt Commonly known as:  TENS 504 Please provide patient with a TENS unit to use on her lumbosacral region and her cervicothoracic region for chronic pain issues. Indications: For chronic lumbar and cervical pain. Indications: Chronic Pain TENS Units Electrodes 2X2 \" Pads For chronic lumbar and cervical pain. Indications: Chronic Pain VITAMIN C 250 mg tablet Generic drug:  ascorbic acid (vitamin C) Take 250 mg by mouth daily. Prescriptions Printed Refills HYDROmorphone [facility] ER) 8 mg Tb24 0 Sig: Take 8 mg by mouth daily for 30 days. Max Daily Amount: 8 mg. For chronic pain  Indications: Chronic Pain with Opioid Tolerance Class: Print Route: Oral  
 oxyCODONE IR (OXY-IR) 15 mg immediate release tablet 0 Sig: Take 1 Tab by mouth every eight (8) hours as needed for Pain for up to 30 days. Max Daily Amount: 45 mg. Indications: Pain Class: Print Route: Oral  
  
Prescriptions Sent to Pharmacy Refills  
 acetaminophen (TYLENOL) 500 mg tablet 2 Sig: Take 2 Tabs by mouth three (3) times daily as needed for Pain (1-2 tabs tid prn pain) for up to 90 days. Indications: BACK PAIN, Pain Class: Normal  
 Pharmacy: [facility] DR JACK LORA [address] [place] [address]. Ph #: [phone] Route: Oral  
 ibuprofen (MOTRIN) 600 mg tablet 3 Sig: Take 1 Tab by mouth every six (6) hours as needed for Pain for up to 30 days. Indications: Pain Class: Normal  
 Pharmacy: [facility] DR JACK LORA [address] [place] [address]. Ph #: [phone] Route: Oral  
  
Follow-up Instructions Return in about 4 weeks (around 7/5/2018) for 30 min. Patient Instructions Acute High Blood Pressure: Care Instructions Your Care Instructions Acute high blood pressure is very high blood pressure. It's a serious problem. Very high blood pressure can damage your brain, heart, eyes, and kidneys. You may have been given medicines to lower your blood pressure. You may have gotten them as pills or through a needle in one of your veins. This is called an IV. And maybe you were given other medicines too. These can be needed when high blood pressure causes other problems. To keep your blood pressure at a lower level, you may need to make healthy lifestyle changes. And you will probably need to take medicines. Be sure to follow up with your doctor about your blood pressure and what you can do about it. Follow-up care is a key part of your treatment and safety. Be sure to make and go to all appointments, and call your doctor if you are having problems. It's also a good idea to know your test results and keep a list of the medicines you take. How can you care for yourself at home? · See your doctor as often as he or she recommends. This is to make sure your blood pressure is under control. You will probably go at least 2 times a year. But it may be more often at first. 
· Take your blood pressure medicine exactly as prescribed. You may take one or more types. They include diuretics, beta-blockers, ACE inhibitors, calcium channel blockers, and angiotensin II receptor blockers. Call your doctor if you think you are having a problem with your medicine. · If you take blood pressure medicine, talk to your doctor before you take decongestants or anti-inflammatory medicine, such as ibuprofen. These can raise blood pressure. · Learn how to check your blood pressure at home. Check it often. · Ask your doctor if it's okay to drink alcohol. · Talk to your doctor about lifestyle changes that can help blood pressure. These include being active and not smoking. When should you call for help? Call 911 anytime you think you may need emergency care. This may mean having symptoms that suggest that your blood pressure is causing a serious heart or blood vessel problem. Your blood pressure may be over 180/110. ? For example, call 911 if: 
? · You have symptoms of a heart attack. These may include: ¨ Chest pain or pressure, or a strange feeling in the chest. 
¨ Sweating. ¨ Shortness of breath. ¨ Nausea or vomiting. ¨ Pain, pressure, or a strange feeling in the back, neck, jaw, or upper belly or in one or both shoulders or arms. ¨ Lightheadedness or sudden weakness. ¨ A fast or irregular heartbeat. ? · You have symptoms of a stroke. These may include: 
¨ Sudden numbness, tingling, weakness, or loss of movement in your face, arm, or leg, especially on only one side of your body. ¨ Sudden vision changes. ¨ Sudden trouble speaking. ¨ Sudden confusion or trouble understanding simple statements. ¨ Sudden problems with walking or balance. ¨ A sudden, severe headache that is different from past headaches. ? · You have severe back or belly pain. ?Do not wait until your blood pressure comes down on its own. Get help right away. ?Call your doctor now or seek immediate care if: 
? · Your blood pressure is much higher than normal (such as 180/110 or higher), but you don't have symptoms. ? · You think high blood pressure is causing symptoms, such as: ¨ Severe headache. ¨ Blurry vision. ? Watch closely for changes in your health, and be sure to contact your doctor if: 
? · Your blood pressure measures 140/90 or higher at least 2 times. That means the top number is 140 or higher or the bottom number is 90 or higher, or both. ? · You think you may be having side effects from your blood pressure medicine. ? · Your blood pressure is usually normal, but it goes above normal at least 2 times. Where can you learn more? Go to http://uriel-percy.info/. Enter P627 in the search box to learn more about \"Acute High Blood Pressure: Care Instructions. \" Current as of: September 21, 2016 Content Version: 11.4 © 3973-1349 [employer]. Care instructions adapted under license by [employer] (which disclaims liability or warranty for this information). If you have questions about a medical condition or this instruction, always ask your healthcare professional. [address] [address] any warranty or liability for your use of this information. Safe Use of Opioid Pain Medicine: Care Instructions Your Care Instructions Pain is your body's way of warning you that something is wrong. Pain feels different for everybody. Only you can describe your pain. A doctor can suggest or prescribe many types of medicines for pain. These range from over-the-counter medicines like acetaminophen (Tylenol) to powerful medicines called opioids. Examples of opioids are fentanyl, hydrocodone, morphine, and oxycodone. Heroin is an illegal opioid Opioids are strong medicines. They can help you manage pain when you use them the right way. But if you misuse them, they can cause serious harm and even death. For these reasons, doctors are very careful about how they prescribe opioids. If you decide to take opioids, here are some things to remember. · Keep your doctor informed. You can get addicted to opioids. The risk is higher if you have a history of substance use. Your doctor will monitor you closely for signs of misuse and addiction and to figure out when you no longer need to take opioids. · Make a treatment plan. The goal of your plan is to be able to function and do the things you need to do, even if you still have some pain. You might be able to manage your pain with other non-opioid options like physical therapy, relaxation, or over-the-counter pain medicines. · Be aware of the side effects. Opioids can cause serious side effects, such as constipation, dry mouth, and nausea. And over time, you may need a higher dose to get pain relief. This is called tolerance. Your body also gets used to opioids. This is called physical dependence. If you suddenly stop taking them, you may have withdrawal symptoms. The doctor carefully considered what pain medicine is right for you. You may not have received opioids if your doctor was concerned about drug interactions or your safety, or if he or she had other concerns. It is best to have one doctor or clinic treat your pain.  This way you will get the pain medicine that will help you the most. And a doctor will be able to watch for any problems that the medicine might cause. The doctor has checked you carefully, but problems can develop later. If you notice any problems or new symptoms,  get medical treatment right away. Follow-up care is a key part of your treatment and safety. Be sure to make and go to all appointments, and call your doctor if you are having problems. It's also a good idea to know your test results and keep a list of the medicines you take. How can you care for yourself at home? · If you need to take opioids to manage your pain, remember these safety tips. ¨ Follow directions carefully. It's easy to misuse opioids if you take a dose other than what's prescribed by your doctor. This can lead to overdose and even death. Even sharing them with someone they weren't meant for is misuse. ¨ Be cautious. Opioids may affect your judgment and decision making. Do not drive or operate machinery until you can think clearly. Talk with your doctor about when it is safe to drive. ¨ Reduce the risk of drug interactions. Opioids can be dangerous if you take them with alcohol or with certain drugs like sleeping pills and muscle relaxers. Make sure your doctor knows about all the other medicines you take, including over-the-counter medicines. Don't start any new medicines before you talk to your doctor or pharmacist. 
Lenny Urban Keep others safe. Store opioids in a safe and secure place. Make sure that pets, children, friends, and family can't get to them. When you're done using opioids, make sure to properly dispose of them. You can either use a community drug take-back program or your drugstore's mail-back program. If one of these programs isn't available, you can flush opioid skin patches and unused opioid pills down the toilet. ¨ Reduce the risk of overdose. Misuse of opioids can be very dangerous. Protect yourself by asking your doctor about a naloxone rescue kit. It can help you-and even save your life-if you take too much of an opioid. · Try other ways to reduce pain. ¨ Relax, and reduce stress. Relaxation techniques such as deep breathing or meditation can help. ¨ Keep moving. Gentle, daily exercise can help reduce pain over the long run. Try low- or no-impact exercises such as walking, swimming, and stationary biking. Do stretches to stay flexible. ¨ Try heat, cold packs, and massage. ¨ Get enough sleep. Pain can make you tired and drain your energy. Talk with your doctor if you have trouble sleeping because of pain. ¨ Think positive. Your thoughts can affect your pain level. Do things that you enjoy to distract yourself when you have pain instead of focusing on the pain. See a movie, read a book, listen to music, or spend time with a friend. · If you are not taking a prescription pain medicine, ask your doctor if you can take an over-the-counter medicine. When should you call for help? Call your doctor now or seek immediate medical care if: 
? · You have a new kind of pain. ? · You have new symptoms, such as a fever or rash, along with the pain. ? Watch closely for changes in your health, and be sure to contact your doctor if: 
? · You think you might be using too much pain medicine, and you need help to use less or stop. ? · Your pain gets worse. ? · You would like a referral to a doctor or clinic that specializes in pain management. Where can you learn more? Go to http://uriel-percy.info/. Enter R108 in the search box to learn more about \"Safe Use of Opioid Pain Medicine: Care Instructions. \" Current as of: October 14, 2016 Content Version: 11.4 © 8187-2991 Signature. Care instructions adapted under license by [employer] (which disclaims liability or warranty for this information).  If you have questions about a medical condition or this instruction, always ask your healthcare professional. [address] any warranty or liability for your use of this information. Introducing [facility]! [facility] introduces Exalead patient portal. Now you can access parts of your medical record, email your doctor's office, and request medication refills online. 1. In your internet browser, go to [email or web address] 2. Click on the First Time User? Click Here link in the Sign In box. You will see the New Member Sign Up page. 3. Enter your Exalead Access Code exactly as it appears below. You will not need to use this code after youve completed the sign-up process. If you do not sign up before the expiration date, you must request a new code. · Exalead Access Code: [email or web address] Expires: 7/30/2018  4:49 PM 
 
4. Enter the last four digits of your Social Security Number (xxxx) and Date of Birth (mm/dd/yyyy) as indicated and click Submit. You will be taken to the next sign-up page. 5. Create a Exalead ID. This will be your Exalead login ID and cannot be changed, so think of one that is secure and easy to remember. 6. Create a Exalead password. You can change your password at any time. 7. Enter your Password Reset Question and Answer. This can be used at a later time if you forget your password. 8. Enter your e-mail address. You will receive e-mail notification when new information is available in [address]. 9. Click Sign Up. You can now view and download portions of your medical record. 10. Click the Download Summary menu link to download a portable copy of your medical information. If you have questions, please visit the Frequently Asked Questions section of the Exalead website. Remember, Exalead is NOT to be used for urgent needs. For medical emergencies, dial 911. Now available from your iPhone and Android! Please provide this summary of care documentation to your next provider. If you have any questions after today's visit, please call [phone].

## 2018-06-07 NOTE — PROGRESS NOTES
Nursing Notes    Patient presents to the office today in follow-up. Patient rates her pain at 7/10 on the numerical pain scale. Reviewed medications with counts as follows:    Rx Date filled Qty Dispensed Pill Count Last Dose Short   exalgo ER 8 mg 05/08/18 30 1 Early this a.m. no   Oxycodone 15 mg 05/08/18 90 4 today no                           POC UDS was not performed in office today    Any new labs or imaging since last appointment? NO    Have you been to an emergency room (ER) or urgent care clinic since your last visit? NO            Have you been hospitalized since your last visit? NO   If yes, where, when, and reason for visit? Have you seen or consulted any other health care providers outside of the [facility]  since your last visit? NO     If yes, where, when, and reason for visit? Ms. Nora Bhatia has a reminder for a \"due or due soon\" health maintenance. I have asked that she contact her primary care provider for follow-up on this health maintenance.

## 2018-06-18 ENCOUNTER — OFFICE VISIT (OUTPATIENT)
Dept: NEUROLOGY | Age: 53
End: 2018-06-18

## 2018-06-18 VITALS
HEART RATE: 84 BPM | RESPIRATION RATE: 18 BRPM | WEIGHT: 244.2 LBS | DIASTOLIC BLOOD PRESSURE: 90 MMHG | OXYGEN SATURATION: 98 % | BODY MASS INDEX: 34.19 KG/M2 | TEMPERATURE: 97.8 F | HEIGHT: 71 IN | SYSTOLIC BLOOD PRESSURE: 130 MMHG

## 2018-06-18 DIAGNOSIS — G89.4 CHRONIC PAIN SYNDROME: ICD-10-CM

## 2018-06-18 DIAGNOSIS — G57.90 NEURITIS OF LOWER EXTREMITY, UNSPECIFIED LATERALITY: Primary | ICD-10-CM

## 2018-06-18 DIAGNOSIS — G44.309 POST-CONCUSSION HEADACHE: ICD-10-CM

## 2018-06-18 DIAGNOSIS — G44.329 CHRONIC POST-TRAUMATIC HEADACHE, NOT INTRACTABLE: ICD-10-CM

## 2018-06-18 RX ORDER — NORTRIPTYLINE HYDROCHLORIDE 50 MG/1
50 CAPSULE ORAL
Qty: 60 CAP | Refills: 6 | Status: SHIPPED | OUTPATIENT
Start: 2018-06-18 | End: 2018-12-17 | Stop reason: SDUPTHER

## 2018-06-18 NOTE — MR AVS SNAPSHOT
[address] [facility] 
 
 
 [address] [id number] [phone] Patient: Adeline Hughesr MRN: [id number] LILLIAN:[id number] Visit Information Date & Time Provider Department Dept. Phone Encounter #  
 6/18/2018  1:00 PM Eliana Del Rio, [address] [phone] [id number] Follow-up Instructions Return in about 6 months (around 12/18/2018). Follow-up and Disposition History Your Appointments 12/17/2018  3:40 PM  
Follow Up with Eliana Del Rio MD  
[address] [facility]) Appt Note: 6mon f/u  
 [address] [address] [phone]  
  
   
 [facility] [id number] Upcoming Health Maintenance Date Due  
 EYE EXAM RETINAL OR DILATED Q1 9/13/1977 Pneumococcal 19-64 Medium Risk (1 of 1 - PPSV23) 9/13/1986 MICROALBUMIN Q1 8/31/2016 FOBT Q 1 YEAR AGE 50-75 9/13/2017 HEMOGLOBIN A1C Q6M 11/17/2017 MEDICARE YEARLY EXAM 3/28/2018 FOOT EXAM Q1 5/17/2018 LIPID PANEL Q1 5/17/2018 Influenza Age 5 to Adult 8/1/2018 BREAST CANCER SCRN MAMMOGRAM 3/31/2019 PAP AKA CERVICAL CYTOLOGY 5/17/2020 DTaP/Tdap/Td series (2 - Td) 8/31/2025 Allergies as of 6/18/2018  Review Complete On: 6/18/2018 By: Ayesha Sweet LPN Severity Noted Reaction Type Reactions Latex  08/26/2014    Rash Lisinopril  06/07/2012   Intolerance Cough Ace Inhibitor - cough Current Immunizations  Reviewed on 8/31/2015 Name Date Influenza Vaccine 9/25/2015, 10/30/2013 Tdap 8/31/2015, 5/22/2014  6:52 PM  
  
 Not reviewed this visit You Were Diagnosed With   
  
 Codes Comments Neuritis of lower extremity, unspecified laterality    -  Primary ICD-10-CM: G57.90 ICD-9-CM: 355.8 Post-concussion headache     ICD-10-CM: A00.716 ICD-9-CM: 339.20 Chronic post-traumatic headache, not intractable     ICD-10-CM: J41.462 ICD-9-CM: 339.22 Chronic pain syndrome     ICD-10-CM: G89.4 ICD-9-CM: 338. 4 Vitals BP Pulse Temp Resp Height(growth percentile) Weight(growth percentile) 130/90 84 97.8 °F (36.6 °C) (Oral) 18 5' 11\" (1.803 m) 244 lb 3.2 oz (110.8 kg) SpO2 BMI OB Status Smoking Status 98% 34.06 kg/m2 Unknown Former Smoker Vitals History BMI and BSA Data Body Mass Index Body Surface Area 34.06 kg/m 2 2.36 m 2 Preferred Pharmacy Pharmacy Name Phone [address] [place] [address]. [phone] Your Updated Medication List  
  
   
This list is accurate as of 6/18/18  1:50 PM.  Always use your most recent med list.  
  
  
  
  
 acetaminophen 500 mg tablet Commonly known as:  TYLENOL Take 2 Tabs by mouth three (3) times daily as needed for Pain (1-2 tabs tid prn pain) for up to 90 days. Indications: BACK PAIN, Pain ACIDOPHILUS 500 million cell Tab Generic drug:  Lactobacillus acidophilus Take  by mouth daily. albuterol 90 mcg/actuation inhaler Commonly known as:  PROVENTIL HFA, VENTOLIN HFA, PROAIR HFA Take 2 Puffs by inhalation every four (4) hours as needed for Wheezing. allopurinol 100 mg tablet Commonly known as:  Jim Sprawls Take 1 Tab by mouth two (2) times a day. carvedilol 25 mg tablet Commonly known as:  COREG  
TAKE ONE TABLET BY MOUTH TWICE DAILY  
  
 cholecalciferol (VITAMIN D3) 5,000 unit Tab tablet Commonly known as:  VITAMIN D3 Take  by mouth daily. cloNIDine HCl 0.1 mg tablet Commonly known as:  CATAPRES  
TAKE ONE TABLET BY MOUTH TWICE DAILY  
  
 cyanocobalamin 2,000 mcg Tber Take  by mouth daily. docusate sodium 100 mg capsule Commonly known as:  Obed Rishabh Take 1 Cap by mouth two (2) times daily as needed for Constipation for up to 90 days. glucose blood VI test strips strip Commonly known as:  RELION PRIME TEST STRIPS Use as directed HYDROmorphone 8 mg Tb24 Commonly known Rashidainjarrett Marcia ER Take 8 mg by mouth daily for 30 days. Max Daily Amount: 8 mg. For chronic pain  Indications: Chronic Pain with Opioid Tolerance  
  
 ibuprofen 600 mg tablet Commonly known as:  MOTRIN Take 1 Tab by mouth every six (6) hours as needed for Pain for up to 30 days. Indications: Pain  
  
 lovastatin 20 mg tablet Commonly known as:  MEVACOR Take 1 Tab by mouth nightly. metFORMIN 1,000 mg tablet Commonly known as:  GLUCOPHAGE Take 1 Tab by mouth two (2) times daily (with meals). multivitamin tablet Commonly known as:  ONE A DAY Take 1 tablet by mouth daily. naloxone 4 mg/actuation nasal spray Commonly known as:  NARCAN  
4 mg by Nasal route as needed for up to 2 doses. Indications: OPIOID TOXICITY  
  
 nortriptyline 50 mg capsule Commonly known as:  PAMELOR Take 1 Cap by mouth nightly. ondansetron hcl 8 mg tablet Commonly known as:  Gerardo Stapleton Take 1 Tab by mouth every eight (8) hours as needed for Nausea. OTHER Motorized Wheelchair  
  
 oxybutynin 5 mg tablet Commonly known as:  YFMTGXPM Take 1 Tab by mouth two (2) times a day. oxyCODONE IR 15 mg immediate release tablet Commonly known as:  OXY-IR Take 1 Tab by mouth every eight (8) hours as needed for Pain for up to 30 days. Max Daily Amount: 45 mg. Indications: Pain  
  
 senna-docusate 8.6-50 mg per tablet Commonly known as:  Olevia Boundary One tab po daily as needed for constipation. spironolactone 25 mg tablet Commonly known as:  ALDACTONE  
TAKE TWO TABLETS BY MOUTH ONCE DAILY  
  
 TENS Units Beth Boyle Commonly known as:  TENS 504 Please provide patient with a TENS unit to use on her lumbosacral region and her cervicothoracic region for chronic pain issues. Indications: For chronic lumbar and cervical pain. Indications: Chronic Pain TENS Units Electrodes 2X2 \" Pads For chronic lumbar and cervical pain. Indications: Chronic Pain VITAMIN C 250 mg tablet Generic drug:  ascorbic acid (vitamin C) Take 250 mg by mouth daily. Prescriptions Sent to Pharmacy Refills  
 nortriptyline (PAMELOR) 50 mg capsule 6 Sig: Take 1 Cap by mouth nightly. Class: Normal  
 Pharmacy: [facility] [address] JACK LORA [address] [facility].  #: [phone] Route: Oral  
  
Follow-up Instructions Return in about 6 months (around 12/18/2018). Introducing [facility]! [facility] introduces [employer] patient portal. Now you can access parts of your medical record, email your doctor's office, and request medication refills online. 1. In your internet browser, go to [email or web address] 2. Click on the First Time User? Click Here link in the Sign In box. You will see the New Member Sign Up page. 3. Enter your [employer] Access Code exactly as it appears below. You will not need to use this code after youve completed the sign-up process. If you do not sign up before the expiration date, you must request a new code. · [employer] Access Code: [email or web address] Expires: 7/30/2018  4:49 PM 
 
4. Enter the last four digits of your Social Security Number (xxxx) and Date of Birth (mm/dd/yyyy) as indicated and click Submit. You will be taken to the next sign-up page. 5. Create a Clinc!t ID. This will be your [employer] login ID and cannot be changed, so think of one that is secure and easy to remember. 6. Create a [employer] password. You can change your password at any time. 7. Enter your Password Reset Question and Answer. This can be used at a later time if you forget your password. 8. Enter your e-mail address. You will receive e-mail notification when new information is available in [address]. 9. Click Sign Up. You can now view and download portions of your medical record. 10. Click the Download Summary menu link to download a portable copy of your medical information. If you have questions, please visit the Frequently Asked Questions section of the [employer] website. Remember, [employer] is NOT to be used for urgent needs. For medical emergencies, dial 911. Now available from your iPhone and Android! Please provide this summary of care documentation to your next provider. If you have any questions after today's visit, please call [phone].

## 2018-06-18 NOTE — PROGRESS NOTES
Re:  Doretha Rangel,Follow up visit     6/18/2018 1:37 PM      SSN: [id number]    Subjective:   Srinath Hernandez returns today. She's been treated by the pain clinic for the last year with only marginal improvement. She still has a lot of pain in the back and down the right leg. He has been falling because of weakness of the right leg. She complains of terrible headaches. She has daily headaches, the medications do not seem to be working at all. She's complaining about lose of her doctors over at pain management. She's complaining about not getting her medications at the pain clinic. She also complains of weakness and numbness of the hands bilaterally. Medications:    Current Outpatient Prescriptions   Medication Sig Dispense Refill    HYDROmorphone (EXALGO ER) 8 mg Tb24 Take 8 mg by mouth daily for 30 days. Max Daily Amount: 8 mg. For chronic pain  Indications: Chronic Pain with Opioid Tolerance 30 Tab 0    oxyCODONE IR (OXY-IR) 15 mg immediate release tablet Take 1 Tab by mouth every eight (8) hours as needed for Pain for up to 30 days. Max Daily Amount: 45 mg. Indications: Pain 90 Tab 0    TENS Units (TENS 504) gee Please provide patient with a TENS unit to use on her lumbosacral region and her cervicothoracic region for chronic pain issues. Indications: For chronic lumbar and cervical pain. Indications: Chronic Pain 1 Device 0    carvedilol (COREG) 25 mg tablet TAKE ONE TABLET BY MOUTH TWICE DAILY 180 Tab 1    cloNIDine HCl (CATAPRES) 0.1 mg tablet TAKE ONE TABLET BY MOUTH TWICE DAILY 180 Tab 1    spironolactone (ALDACTONE) 25 mg tablet TAKE TWO TABLETS BY MOUTH ONCE DAILY (Patient taking differently: 25 mg. TAKE TWO TABLETS BY MOUTH ONCE DAILY) 180 Tab 1    allopurinol (ZYLOPRIM) 100 mg tablet Take 1 Tab by mouth two (2) times a day. 180 Tab 1    naloxone 4 mg/actuation spry 4 mg by Nasal route as needed for up to 2 doses.  Indications: OPIOID TOXICITY 1 Box 1    oxybutynin (DITROPAN) 5 mg tablet Take 1 Tab by mouth two (2) times a day. 60 Tab 3    glucose blood VI test strips (RELION PRIME TEST STRIPS) strip Use as directed 50 Strip 2    albuterol (PROVENTIL HFA, VENTOLIN HFA, PROAIR HFA) 90 mcg/actuation inhaler Take 2 Puffs by inhalation every four (4) hours as needed for Wheezing. 1 Inhaler 0    OTHER Motorized Wheelchair 1 Device prn    multivitamin (ONE A DAY) tablet Take 1 tablet by mouth daily.  acetaminophen (TYLENOL) 500 mg tablet Take 2 Tabs by mouth three (3) times daily as needed for Pain (1-2 tabs tid prn pain) for up to 90 days. Indications: BACK PAIN, Pain 200 Tab 2    ibuprofen (MOTRIN) 600 mg tablet Take 1 Tab by mouth every six (6) hours as needed for Pain for up to 30 days. Indications: Pain 60 Tab 3    docusate sodium (COLACE) 100 mg capsule Take 1 Cap by mouth two (2) times daily as needed for Constipation for up to 90 days. 60 Cap 2    senna-docusate (PERICOLACE) 8.6-50 mg per tablet One tab po daily as needed for constipation. 30 Tab 0    tens unit electrodes (TENS UNITS ELECTRODES) 2X2 \" pads For chronic lumbar and cervical pain. Indications: Chronic Pain 1 Each 0    metFORMIN (GLUCOPHAGE) 1,000 mg tablet Take 1 Tab by mouth two (2) times daily (with meals). 180 Tab 1    lovastatin (MEVACOR) 20 mg tablet Take 1 Tab by mouth nightly. 30 Tab 5    ondansetron hcl (ZOFRAN, AS HYDROCHLORIDE,) 8 mg tablet Take 1 Tab by mouth every eight (8) hours as needed for Nausea. 12 Tab 1    ascorbic acid (VITAMIN C) 250 mg tablet Take 250 mg by mouth daily.  Cholecalciferol, Vitamin D3, 5,000 unit Tab Take  by mouth daily.  cyanocobalamin 2,000 mcg TbER Take  by mouth daily.  lactobacillus acidophilus (ACIDOPHILUS) 500 million cell Tab Take  by mouth daily.            Vital signs:    Visit Vitals    /90    Pulse 84    Temp 97.8 °F (36.6 °C) (Oral)    Resp 18    Ht 5' 11\" (1.803 m)    Wt 110.8 kg (244 lb 3.2 oz)    SpO2 98%    BMI 34.06 kg/m2       Review of Systems: does get some dizziness. Has some pain in the left ear. As above otherwise 11 point review of systems negative including;   Constitutional no fever or chills  Skin denies rash or itching  HEENT  Denies tinnitus, hearing lose  Eyes denies diplopia vision lose  Respiratory denies sortness of breath  Cardiovascular denies chest pain, dyspnea on exertion  Gastrointestinal denies nausea, vomiting, diarrhea, constipation  Genitourinary denies incontinence  Musculoskeletal denies joint pain or swelling  Endocrine denies weight change  Hematology denies easy bruising or bleeding   Neurological as above in HPI      Patient Active Problem List   Diagnosis Code    Hypertension I10    Diabetes mellitus type II, controlled ([facility] [address] E11.9    Coronary atherosclerosis of native coronary artery(aka CAD) I25.10    Headache R51    GERD (gastroesophageal reflux disease) K21.9    Gout M10.9    Endometriosis N80.9    Abnormal Pap smear [id number]    Contusion T14. 8XXA    Post-concussion headache G44.309    Chronic pain G89.29    Back pain M54.9    Multiple contusions T07. Blankenship Mention    Fall W19. Blankenship Mention    Chest pain R07.9    Neck pain M54.2    Disturbance of skin sensation R20.9    Carpal tunnel syndrome G56.00    Bilateral carpal tunnel syndrome G56.03    Cervicalgia M54.2    Brachial neuritis M54.12    Degeneration of cervical intervertebral disc M50.30    Degeneration of lumbar or lumbosacral intervertebral disc M51.37    Encounter for long-term opiate analgesic use Z79.891    Chronic pain syndrome G89.4    Neuritis of lower extremity G57.90    Chronic midline low back pain M54.5, G89.29         Objective: The patient is awake, alert, and oriented x 3, knows its March 1, not sure of the day of the week.  Speech is dysfluent, slow and memory is intact.  She is psychomotor slow. Cranial Nerves: II - Visual fields are full to confrontation.   III, IV, VI - Extraocular movements are intact. There is no nystagmus. V - Facial sensation is intact to pinprick. VII - Face is symmetrical.  VIII - Hearing is present. IX, X, XII - Palate is symmetrical.   XI - Shoulder shrugging and head turning intact  Motor: The patient has generalized weakness of both legs, worse on the right than the left. She's no better than 2+/5 on the right. Tone is normal. Reflexes are 2+ and symmetrical. Plantars are down going. Gait is abnormal, she limps off of the right leg, uses a cane in the right hand. CBC:   Lab Results   Component Value Date/Time    WBC 6.6 07/13/2017 10:40 AM    RBC 5.50 (H) 07/13/2017 10:40 AM    HGB 13.3 07/13/2017 10:40 AM    HCT 40.3 07/13/2017 10:40 AM    PLATELET 166 80/71/8847 10:40 AM     BMP:   Lab Results   Component Value Date/Time    Glucose 85 05/17/2017 01:09 PM    Sodium 139 05/17/2017 01:09 PM    Potassium 4.9 05/17/2017 01:09 PM    Chloride 105 05/17/2017 01:09 PM    CO2 26 05/17/2017 01:09 PM    BUN 9 05/17/2017 01:09 PM    Creatinine 0.80 05/17/2017 01:09 PM    Calcium 9.3 05/17/2017 01:09 PM     CMP:   Lab Results   Component Value Date/Time    Glucose 85 05/17/2017 01:09 PM    Sodium 139 05/17/2017 01:09 PM    Potassium 4.9 05/17/2017 01:09 PM    Chloride 105 05/17/2017 01:09 PM    CO2 26 05/17/2017 01:09 PM    BUN 9 05/17/2017 01:09 PM    Creatinine 0.80 05/17/2017 01:09 PM    Calcium 9.3 05/17/2017 01:09 PM    Anion gap 8 05/17/2017 01:09 PM    BUN/Creatinine ratio 11 (L) 05/17/2017 01:09 PM    Alk.  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 Continued weakness of the right leg, no clearcut etiology established. Slow and slurred speech with no other cognitive deficits, unclear etiology. Plan: Will renew her medications I'm providing, which is only the Pamelor 50 mg BID for now. I really have very little to offer her at this time in reference to therapy and hope pain management can help her. She seems to have some unclear pain syndrome, possibly fibromyalgia. Will see back here in 6 months. Sincerely,        Viktoria Pugh.  Reza Castellon M.D.

## 2018-06-18 NOTE — PROGRESS NOTES
Patient is here for headaches, not getting any better  Also carpal  tunnel is not getting better. cant feel her hands when she wakes up.   Visit Vitals    /90    Pulse 84    Temp 97.8 °F (36.6 °C) (Oral)    Resp 18    Ht 5' 11\" (1.803 m)    Wt 110.8 kg (244 lb 3.2 oz)    SpO2 98%    BMI 34.06 kg/m2

## 2018-06-21 ENCOUNTER — DOCUMENTATION ONLY (OUTPATIENT)
Dept: PAIN MANAGEMENT | Age: 53
End: 2018-06-21

## 2018-06-21 NOTE — PROGRESS NOTES
The office received a request from the pt's W/C about an opioid progress report. This report was shown to the provider to be filled out. He reviewed it and stated that all of the necessary information has been documented in his last couple of office notes. He would like the last 2 office notes faxed over to [facility] along with the pt's most recent opioid agreement and the last 2 pill counts. All of this information was faxed back to [facility] to the attention of [employer] as requested. A fax confirmation was received.

## 2018-07-09 VITALS
HEART RATE: 94 BPM | SYSTOLIC BLOOD PRESSURE: 158 MMHG | DIASTOLIC BLOOD PRESSURE: 100 MMHG | OXYGEN SATURATION: 99 % | TEMPERATURE: 97.1 F | RESPIRATION RATE: 18 BRPM

## 2018-07-09 PROCEDURE — 99281 EMR DPT VST MAYX REQ PHY/QHP: CPT

## 2018-07-10 ENCOUNTER — HOSPITAL ENCOUNTER (EMERGENCY)
Age: 53
Discharge: HOME | End: 2018-07-10
Attending: EMERGENCY MEDICINE
Payer: MEDICARE

## 2018-07-10 ENCOUNTER — OFFICE VISIT (OUTPATIENT)
Dept: PAIN MANAGEMENT | Age: 53
End: 2018-07-10

## 2018-07-10 VITALS
BODY MASS INDEX: 34.16 KG/M2 | RESPIRATION RATE: 18 BRPM | TEMPERATURE: 97.3 F | DIASTOLIC BLOOD PRESSURE: 89 MMHG | HEIGHT: 71 IN | WEIGHT: 244 LBS | SYSTOLIC BLOOD PRESSURE: 140 MMHG | HEART RATE: 92 BPM

## 2018-07-10 DIAGNOSIS — Z79.899 HIGH RISK MEDICATION USE: ICD-10-CM

## 2018-07-10 DIAGNOSIS — M54.2 NECK PAIN: ICD-10-CM

## 2018-07-10 DIAGNOSIS — G89.4 CHRONIC PAIN SYNDROME: ICD-10-CM

## 2018-07-10 DIAGNOSIS — G89.4 CHRONIC PAIN SYNDROME: Primary | ICD-10-CM

## 2018-07-10 DIAGNOSIS — M50.30 OTHER CERVICAL DISC DEGENERATION, UNSPECIFIED CERVICAL REGION: ICD-10-CM

## 2018-07-10 DIAGNOSIS — M51.37 OTHER INTERVERTEBRAL DISC DEGENERATION, LUMBOSACRAL REGION: ICD-10-CM

## 2018-07-10 DIAGNOSIS — M54.5 LOW BACK PAIN, UNSPECIFIED BACK PAIN LATERALITY, UNSPECIFIED CHRONICITY, WITH SCIATICA PRESENCE UNSPECIFIED: Primary | ICD-10-CM

## 2018-07-10 DIAGNOSIS — Z79.899 ENCOUNTER FOR LONG-TERM (CURRENT) USE OF HIGH-RISK MEDICATION: ICD-10-CM

## 2018-07-10 LAB
ALCOHOL UR POC: NORMAL
AMPHETAMINES UR POC: NEGATIVE
BARBITURATES UR POC: NORMAL
BENZODIAZEPINES UR POC: NEGATIVE
BUPRENORPHINE UR POC: NEGATIVE
CANNABINOIDS UR POC: NEGATIVE
CARISOPRODOL UR POC: NORMAL
COCAINE UR POC: NEGATIVE
FENTANYL UR POC: NORMAL
MDMA/ECSTASY UR POC: NORMAL
METHADONE UR POC: NEGATIVE
METHAMPHETAMINE UR POC: NORMAL
METHYLPHENIDATE UR POC: NORMAL
OPIATES UR POC: NORMAL
OXYCODONE UR POC: NORMAL
PHENCYCLIDINE UR POC: NORMAL
PROPOXYPHENE UR POC: NORMAL
TRAMADOL UR POC: NORMAL
TRICYCLICS UR POC: NORMAL

## 2018-07-10 PROCEDURE — 96372 THER/PROPH/DIAG INJ SC/IM: CPT

## 2018-07-10 PROCEDURE — 74011250636 HC RX REV CODE- 250/636: Performed by: EMERGENCY MEDICINE

## 2018-07-10 PROCEDURE — 74011250636 HC RX REV CODE- 250/636

## 2018-07-10 RX ORDER — HYDROMORPHONE HYDROCHLORIDE 1 MG/ML
2 INJECTION, SOLUTION INTRAMUSCULAR; INTRAVENOUS; SUBCUTANEOUS
Status: DISCONTINUED | OUTPATIENT
Start: 2018-07-10 | End: 2018-07-10

## 2018-07-10 RX ORDER — OXYCODONE HYDROCHLORIDE 15 MG/1
15 TABLET ORAL
Qty: 90 TAB | Refills: 0 | Status: SHIPPED | OUTPATIENT
Start: 2018-07-10 | End: 2018-08-10 | Stop reason: SDUPTHER

## 2018-07-10 RX ORDER — HYDROMORPHONE HYDROCHLORIDE 2 MG/ML
INJECTION, SOLUTION INTRAMUSCULAR; INTRAVENOUS; SUBCUTANEOUS
Status: COMPLETED
Start: 2018-07-10 | End: 2018-07-10

## 2018-07-10 RX ORDER — HYDROMORPHONE HYDROCHLORIDE 4 MG/1
4 TABLET ORAL
Qty: 30 TAB | Refills: 0 | Status: SHIPPED | OUTPATIENT
Start: 2018-07-18 | End: 2018-08-10

## 2018-07-10 RX ORDER — HYDROMORPHONE HYDROCHLORIDE 2 MG/ML
2 INJECTION, SOLUTION INTRAMUSCULAR; INTRAVENOUS; SUBCUTANEOUS ONCE
Status: DISCONTINUED | OUTPATIENT
Start: 2018-07-10 | End: 2018-07-10 | Stop reason: HOSPADM

## 2018-07-10 RX ORDER — HYDROMORPHONE HYDROCHLORIDE 1 MG/ML
2 INJECTION, SOLUTION INTRAMUSCULAR; INTRAVENOUS; SUBCUTANEOUS ONCE
Status: DISCONTINUED | OUTPATIENT
Start: 2018-07-10 | End: 2018-07-10

## 2018-07-10 RX ORDER — HYDROMORPHONE HYDROCHLORIDE 2 MG/ML
2 INJECTION, SOLUTION INTRAMUSCULAR; INTRAVENOUS; SUBCUTANEOUS ONCE
Status: COMPLETED | OUTPATIENT
Start: 2018-07-10 | End: 2018-07-10

## 2018-07-10 RX ADMIN — HYDROMORPHONE HYDROCHLORIDE 2 MG: 2 INJECTION, SOLUTION INTRAMUSCULAR; INTRAVENOUS; SUBCUTANEOUS at 01:42

## 2018-07-10 NOTE — DISCHARGE INSTRUCTIONS
SPECIFIC PATIENT INSTRUCTIONS FROM THE PHYSICIAN WHO TREATED YOU IN THE ER TODAY:  1. Return if any concerns or worsening of condition(s)  2. Take your PREVIOUSLY prescribed pain medication for pain. 3. If you have PREVIOUSLY prescribed nausea/vomiting medication or if you were prescribed Zofran in the emergency department today, then take that as prescribed for nausea and/or vomiting. 3. Follow up with your primary doctor in the next 2-4 days for reevaluation. IF you have a pain management doctor, then follow up with them as well in 2-4 days for reevaluation. Chronic Pain Management    We care about your pain and want what is best for you. Management of chronic pain is a carefully researched science and this protocol was developed using that research. If you have a chronic pain syndrome (chronic headache, back or neck pain, toothache, abdominal or pelvis pain without a specific diagnosis, or neuropathic pain such as fibromyalgia) or recurrent visits for the same condition without a specific diagnosis, you may be treated with one or more of the following medications. Either intravenous or intramuscular, or by mouth: Anti-inflammatory medication, Toradol, Nubain, Bentyl, Phenergan, Compazine, Haldol, Vistaril, Ultram, Fioricet, Fiorinal.    We will be unable to PRESCRIBE opioid/ narcotic medication(s) or only a limited number of opioid/ narcotic tablets. Some of these medications can impair your ability to drive, making you a danger to yourself and others. Therefore you must have a  present in the Emergency Department (ED) in order to receive those medications during your emergency department visit. Due to rebound pain and the addictive nature of narcotics, you should receive these medications from only one source, such as your Primary Care Physician (PCP), or the specialist managing your chronic pain. We are unable to refill your narcotic medication.   Likewise, if you have received narcotics from more than one source in the last 2 months for any of the above stated conditions, we cannot provide you with a refill or different narcotic medication. If you have a chronic pain syndrome managed by your doctor, you should have provisions for break-through pain. It is your responsibility to avoid running out of your medications. If you have a crisis, you should contact your physician and if he/she instructs you to come to the ED, have them call ahead and speak to our emergency department physician concerning your care. This protocol has been created to better serve you as the patient and create consistent care among physicians. [employer] Activation    Thank you for requesting access to [employer]. Please follow the instructions below to securely access and download your online medical record. We[employer] allows you to send messages to your doctor, view your test results, renew your prescriptions, schedule appointments, and more. How Do I Sign Up? 1. In your internet browser, go to [email or web address]. 2. Click on the First Time User? Click Here link in the Sign In box. You will see the New Member Sign Up page. 3. Enter your [employer] Access Code exactly as it appears below. You will not need to use this code after youve completed the sign-up process. If you do not sign up before the expiration date, you must request a new code. We[employer] Access Code: [email or web address]  Expires: 2018  4:49 PM (This is the date your WeVideo access code will )    4. Enter the last four digits of your Social Security Number (xxxx) and Date of Birth (mm/dd/yyyy) as indicated and click Submit. You will be taken to the next sign-up page. 5. Create a WeVideo ID. This will be your [employer] login ID and cannot be changed, so think of one that is secure and easy to remember. 6. Create a WeVideo password. You can change your password at any time.   7. Enter your Password Reset Question and Answer. This can be used at a later time if you forget your password. 8. Enter your e-mail address. You will receive e-mail notification when new information is available in [address] 9. Click Sign Up. You can now view and download portions of your medical record. 10. Click the Download Summary menu link to download a portable copy of your medical information. Additional Information    If you have questions, please visit the Frequently Asked Questions section of the [employer] website at [email or web address] Remember, [employer] is NOT to be used for urgent needs. For medical emergencies, dial 911.

## 2018-07-10 NOTE — MR AVS SNAPSHOT
[address] [place] [address] 
[phone] Patient: Jolly Estrada MRN: [id number] CGL:[id number] Visit Information Date & Time Provider Department Dept. Phone Encounter #  
 7/10/2018  4:00 PM Ramón Morel NP [address] [facility] [phone] Follow-up Instructions Return in about 4 weeks (around 8/7/2018) for 30 mins. Your Appointments 12/17/2018  3:40 PM  
Follow Up with Rima Soliman MD  
[address]) Appt Note: 6mon f/u  
 [address] [place] [address] [phone]  
  
   
 [place] [id number] Upcoming Health Maintenance Date Due  
 EYE EXAM RETINAL OR DILATED Q1 9/13/1977 Pneumococcal 19-64 Medium Risk (1 of 1 - PPSV23) 9/13/1986 MICROALBUMIN Q1 8/31/2016 FOBT Q 1 YEAR AGE 50-75 9/13/2017 HEMOGLOBIN A1C Q6M 11/17/2017 MEDICARE YEARLY EXAM 3/28/2018 FOOT EXAM Q1 5/17/2018 LIPID PANEL Q1 5/17/2018 Influenza Age 5 to Adult 8/1/2018 BREAST CANCER SCRN MAMMOGRAM 3/31/2019 PAP AKA CERVICAL CYTOLOGY 5/17/2020 DTaP/Tdap/Td series (2 - Td) 8/31/2025 Allergies as of 7/10/2018  Review Complete On: 7/10/2018 By: Ramón Morel NP Severity Noted Reaction Type Reactions Latex  08/26/2014    Rash Lisinopril  06/07/2012   Intolerance Cough Ace Inhibitor - cough Current Immunizations  Reviewed on 8/31/2015 Name Date Influenza Vaccine 9/25/2015, 10/30/2013 Tdap 8/31/2015, 5/22/2014  6:52 PM  
  
 Not reviewed this visit You Were Diagnosed With   
  
 Codes Comments Low back pain, unspecified back pain laterality, unspecified chronicity, with sciatica presence unspecified    -  Primary ICD-10-CM: M54.5 ICD-9-CM: 724.2 Encounter for long-term (current) use of high-risk medication     ICD-10-CM: Z79.899 ICD-9-CM: V58.69 Other cervical disc degeneration, unspecified cervical region     ICD-10-CM: M50.30 ICD-9-CM: 722.4 Other intervertebral disc degeneration, lumbosacral region     ICD-10-CM: M51.37 
ICD-9-CM: 722.52 High risk medication use     ICD-10-CM: Z79.899 ICD-9-CM: V58.69 Neck pain     ICD-10-CM: M54.2 ICD-9-CM: 723.1 Chronic pain syndrome     ICD-10-CM: G89.4 ICD-9-CM: 338. 4 Vitals BP Pulse Temp Resp Height(growth percentile) Weight(growth percentile) 140/89 (BP 1 Location: Right arm, BP Patient Position: Sitting) 92 97.3 °F (36.3 °C) (Oral) 18 5' 11\" (1.803 m) 244 lb (110.7 kg) BMI OB Status Smoking Status 34.03 kg/m2 Unknown Former Smoker Vitals History BMI and BSA Data Body Mass Index Body Surface Area 34.03 kg/m 2 2.35 m 2 Preferred Pharmacy Pharmacy Name Phone Jessica Narvaez [address] [place] [address]. [phone] Your Updated Medication List  
  
   
This list is accurate as of 7/10/18  4:57 PM.  Always use your most recent med list.  
  
  
  
  
 acetaminophen 500 mg tablet Commonly known as:  TYLENOL Take 2 Tabs by mouth three (3) times daily as needed for Pain (1-2 tabs tid prn pain) for up to 90 days. Indications: BACK PAIN, Pain ACIDOPHILUS 500 million cell Tab Generic drug:  Lactobacillus acidophilus Take  by mouth daily. albuterol 90 mcg/actuation inhaler Commonly known as:  PROVENTIL HFA, VENTOLIN HFA, PROAIR HFA Take 2 Puffs by inhalation every four (4) hours as needed for Wheezing. allopurinol 100 mg tablet Commonly known as:  Cyntha Dicker Take 1 Tab by mouth two (2) times a day. carvedilol 25 mg tablet Commonly known as:  COREG  
TAKE ONE TABLET BY MOUTH TWICE DAILY  
  
 cholecalciferol (VITAMIN D3) 5,000 unit Tab tablet Commonly known as:  VITAMIN D3 Take  by mouth daily. cloNIDine HCl 0.1 mg tablet Commonly known as:  CATAPRES  
TAKE ONE TABLET BY MOUTH TWICE DAILY  
  
 cyanocobalamin 2,000 mcg Tber Take  by mouth daily. docusate sodium 100 mg capsule Commonly known as:  Scott Philippe Take 1 Cap by mouth two (2) times daily as needed for Constipation for up to 90 days. glucose blood VI test strips strip Commonly known as:  RELION PRIME TEST STRIPS Use as directed HYDROmorphone 4 mg tablet Commonly known as:  DILAUDID Take 1 Tab by mouth once over twenty-four (24) hours for 30 days. Start taking on:  7/18/2018  
  
 lovastatin 20 mg tablet Commonly known as:  MEVACOR Take 1 Tab by mouth nightly. metFORMIN 1,000 mg tablet Commonly known as:  GLUCOPHAGE Take 1 Tab by mouth two (2) times daily (with meals). multivitamin tablet Commonly known as:  ONE A DAY Take 1 tablet by mouth daily. naloxone 4 mg/actuation nasal spray Commonly known as:  NARCAN  
4 mg by Nasal route as needed for up to 2 doses. Indications: OPIOID TOXICITY  
  
 nortriptyline 50 mg capsule Commonly known as:  PAMELOR Take 1 Cap by mouth nightly. ondansetron hcl 8 mg tablet Commonly known as:  Lisa Britton Take 1 Tab by mouth every eight (8) hours as needed for Nausea. OTHER Motorized Wheelchair  
  
 oxybutynin 5 mg tablet Commonly known as:  DKCJHAOJ Take 1 Tab by mouth two (2) times a day. oxyCODONE IR 15 mg immediate release tablet Commonly known as:  OXY-IR Take 1 Tab by mouth every eight (8) hours as needed for Pain for up to 30 days. Max Daily Amount: 45 mg.  
  
 senna-docusate 8.6-50 mg per tablet Commonly known as:  Negar Gutierrez One tab po daily as needed for constipation. spironolactone 25 mg tablet Commonly known as:  ALDACTONE  
TAKE TWO TABLETS BY MOUTH ONCE DAILY  
  
 TENS Units Irvin Pick Commonly known as:  TENS 504 Please provide patient with a TENS unit to use on her lumbosacral region and her cervicothoracic region for chronic pain issues. Indications: For chronic lumbar and cervical pain. Indications: Chronic Pain TENS Units Electrodes 2X2 \" Pads For chronic lumbar and cervical pain. Indications: Chronic Pain VITAMIN C 250 mg tablet Generic drug:  ascorbic acid (vitamin C) Take 250 mg by mouth daily. Prescriptions Printed Refills HYDROmorphone (DILAUDID) 4 mg tablet 0 Starting on: 7/18/2018 Sig: Take 1 Tab by mouth once over twenty-four (24) hours for 30 days. Class: Print Route: Oral  
 oxyCODONE IR (OXY-IR) 15 mg immediate release tablet 0 Sig: Take 1 Tab by mouth every eight (8) hours as needed for Pain for up to 30 days. Max Daily Amount: 45 mg.  
 Class: Print Route: Oral  
  
We Performed the Following AMB POC DRUG SCREEN () [ [facility]] DRUG SCREEN [[id number] Custom] Follow-up Instructions Return in about 4 weeks (around 8/7/2018) for 30 mins. Patient Instructions Learning About Sleeping Well What does sleeping well mean? Sleeping well means getting enough sleep. How much sleep is enough varies among people. The number of hours you sleep is not as important as how you feel when you wake up. If you do not feel refreshed, you probably need more sleep. Another sign of not getting enough sleep is feeling tired during the day. The average total nightly sleep time is 7½ to 8 hours. Healthy adults may need a little more or a little less than this. Why is getting enough sleep important? Getting enough quality sleep is a basic part of good health. When your sleep suffers, your mood and your thoughts can suffer too. You may find yourself feeling more grumpy or stressed. Not getting enough sleep also can lead to serious problems, including injury, accidents, anxiety, and depression. What might cause poor sleeping? Many things can cause sleep problems, including: · Stress. Stress can be caused by fear about a single event, such as giving a speech. Or you may have ongoing stress, such as worry about work or school. · Depression, anxiety, and other mental or emotional conditions. · Changes in your sleep habits or surroundings. This includes changes that happen where you sleep, such as noise, light, or sleeping in a different bed. It also includes changes in your sleep pattern, such as having jet lag or working a late shift. · Health problems, such as pain, breathing problems, and restless legs syndrome. · Lack of regular exercise. How can you help yourself? Here are some tips that may help you sleep more soundly and wake up feeling more refreshed. Your sleeping area · Use your bedroom only for sleeping and sex. A bit of light reading may help you fall asleep. But if it doesn't, do your reading elsewhere in the house. Don't watch TV in bed. · Be sure your bed is big enough to stretch out comfortably, especially if you have a sleep partner. · Keep your bedroom quiet, dark, and cool. Use curtains, blinds, or a sleep mask to block out light. To block out noise, use earplugs, soothing music, or a \"white noise\" machine. Your evening and bedtime routine · Create a relaxing bedtime routine. You might want to take a warm shower or bath, listen to soothing music, or drink a cup of noncaffeinated tea. · Go to bed at the same time every night. And get up at the same time every morning, even if you feel tired. What to avoid · Limit caffeine (coffee, tea, caffeinated sodas) during the day, and don't have any for at least 4 to 6 hours before bedtime. · Don't drink alcohol before bedtime. Alcohol can cause you to wake up more often during the night. · Don't smoke or use tobacco, especially in the evening. Nicotine can keep you awake. · Don't take naps during the day, especially close to bedtime. · Don't lie in bed awake for too long.  If you can't fall asleep, or if you wake up in the middle of the night and can't get back to sleep within 15 minutes or so, get out of bed and go to another room until you feel sleepy. · Don't take medicine right before bed that may keep you awake or make you feel hyper or energized. Your doctor can tell you if your medicine may do this and if you can take it earlier in the day. If you can't sleep · Imagine yourself in a peaceful, pleasant scene. Focus on the details and feelings of being in a place that is relaxing. · Get up and do a quiet or boring activity until you feel sleepy. · Don't drink any liquids after 6 p.m. if you wake up often because you have to go to the bathroom. Where can you learn more? Go to http://uriel-percy.info/. Enter S859 in the search box to learn more about \"Learning About Sleeping Well. \" Current as of: December 7, 2017 Content Version: 11.7 © 5040-6919 [employer]. Care instructions adapted under license by [employer] (which disclaims liability or warranty for this information). If you have questions about a medical condition or this instruction, always ask your healthcare professional. [address] any warranty or liability for your use of this information. Learning About Sleeping Well What does sleeping well mean? Sleeping well means getting enough sleep. How much sleep is enough varies among people. The number of hours you sleep is not as important as how you feel when you wake up. If you do not feel refreshed, you probably need more sleep. Another sign of not getting enough sleep is feeling tired during the day. The average total nightly sleep time is 7½ to 8 hours. Healthy adults may need a little more or a little less than this. Why is getting enough sleep important? Getting enough quality sleep is a basic part of good health. When your sleep suffers, your mood and your thoughts can suffer too.  You may find yourself feeling more grumpy or stressed. Not getting enough sleep also can lead to serious problems, including injury, accidents, anxiety, and depression. What might cause poor sleeping? Many things can cause sleep problems, including: · Stress. Stress can be caused by fear about a single event, such as giving a speech. Or you may have ongoing stress, such as worry about work or school. · Depression, anxiety, and other mental or emotional conditions. · Changes in your sleep habits or surroundings. This includes changes that happen where you sleep, such as noise, light, or sleeping in a different bed. It also includes changes in your sleep pattern, such as having jet lag or working a late shift. · Health problems, such as pain, breathing problems, and restless legs syndrome. · Lack of regular exercise. How can you help yourself? Here are some tips that may help you sleep more soundly and wake up feeling more refreshed. Your sleeping area · Use your bedroom only for sleeping and sex. A bit of light reading may help you fall asleep. But if it doesn't, do your reading elsewhere in the house. Don't watch TV in bed. · Be sure your bed is big enough to stretch out comfortably, especially if you have a sleep partner. · Keep your bedroom quiet, dark, and cool. Use curtains, blinds, or a sleep mask to block out light. To block out noise, use earplugs, soothing music, or a \"white noise\" machine. Your evening and bedtime routine · Create a relaxing bedtime routine. You might want to take a warm shower or bath, listen to soothing music, or drink a cup of noncaffeinated tea. · Go to bed at the same time every night. And get up at the same time every morning, even if you feel tired. What to avoid · Limit caffeine (coffee, tea, caffeinated sodas) during the day, and don't have any for at least 4 to 6 hours before bedtime. · Don't drink alcohol before bedtime.  Alcohol can cause you to wake up more often during the night. · Don't smoke or use tobacco, especially in the evening. Nicotine can keep you awake. · Don't take naps during the day, especially close to bedtime. · Don't lie in bed awake for too long. If you can't fall asleep, or if you wake up in the middle of the night and can't get back to sleep within 15 minutes or so, get out of bed and go to another room until you feel sleepy. · Don't take medicine right before bed that may keep you awake or make you feel hyper or energized. Your doctor can tell you if your medicine may do this and if you can take it earlier in the day. If you can't sleep · Imagine yourself in a peaceful, pleasant scene. Focus on the details and feelings of being in a place that is relaxing. · Get up and do a quiet or boring activity until you feel sleepy. · Don't drink any liquids after 6 p.m. if you wake up often because you have to go to the bathroom. Where can you learn more? Go to http://urielSmartStudy.compercy.info/. Enter H712 in the search box to learn more about \"Learning About Sleeping Well. \" Current as of: December 7, 2017 Content Version: 11.7 © 6353-8442 [employer]. Care instructions adapted under license by [employer] (which disclaims liability or warranty for this information). If you have questions about a medical condition or this instruction, always ask your healthcare professional. [place] [address] any warranty or liability for your use of this information. Learning About Sleeping Well What does sleeping well mean? Sleeping well means getting enough sleep. How much sleep is enough varies among people. The number of hours you sleep is not as important as how you feel when you wake up. If you do not feel refreshed, you probably need more sleep. Another sign of not getting enough sleep is feeling tired during the day. The average total nightly sleep time is 7½ to 8 hours. Healthy adults may need a little more or a little less than this. Why is getting enough sleep important? Getting enough quality sleep is a basic part of good health. When your sleep suffers, your mood and your thoughts can suffer too. You may find yourself feeling more grumpy or stressed. Not getting enough sleep also can lead to serious problems, including injury, accidents, anxiety, and depression. What might cause poor sleeping? Many things can cause sleep problems, including: · Stress. Stress can be caused by fear about a single event, such as giving a speech. Or you may have ongoing stress, such as worry about work or school. · Depression, anxiety, and other mental or emotional conditions. · Changes in your sleep habits or surroundings. This includes changes that happen where you sleep, such as noise, light, or sleeping in a different bed. It also includes changes in your sleep pattern, such as having jet lag or working a late shift. · Health problems, such as pain, breathing problems, and restless legs syndrome. · Lack of regular exercise. How can you help yourself? Here are some tips that may help you sleep more soundly and wake up feeling more refreshed. Your sleeping area · Use your bedroom only for sleeping and sex. A bit of light reading may help you fall asleep. But if it doesn't, do your reading elsewhere in the house. Don't watch TV in bed. · Be sure your bed is big enough to stretch out comfortably, especially if you have a sleep partner. · Keep your bedroom quiet, dark, and cool. Use curtains, blinds, or a sleep mask to block out light. To block out noise, use earplugs, soothing music, or a \"white noise\" machine. Your evening and bedtime routine · Create a relaxing bedtime routine. You might want to take a warm shower or bath, listen to soothing music, or drink a cup of noncaffeinated tea. · Go to bed at the same time every night. And get up at the same time every morning, even if you feel tired. What to avoid · Limit caffeine (coffee, tea, caffeinated sodas) during the day, and don't have any for at least 4 to 6 hours before bedtime. · Don't drink alcohol before bedtime. Alcohol can cause you to wake up more often during the night. · Don't smoke or use tobacco, especially in the evening. Nicotine can keep you awake. · Don't take naps during the day, especially close to bedtime. · Don't lie in bed awake for too long. If you can't fall asleep, or if you wake up in the middle of the night and can't get back to sleep within 15 minutes or so, get out of bed and go to another room until you feel sleepy. · Don't take medicine right before bed that may keep you awake or make you feel hyper or energized. Your doctor can tell you if your medicine may do this and if you can take it earlier in the day. If you can't sleep · Imagine yourself in a peaceful, pleasant scene. Focus on the details and feelings of being in a place that is relaxing. · Get up and do a quiet or boring activity until you feel sleepy. · Don't drink any liquids after 6 p.m. if you wake up often because you have to go to the bathroom. Where can you learn more? Go to http://uriel-percy.info/. Enter K141 in the search box to learn more about \"Learning About Sleeping Well. \" Current as of: December 7, 2017 Content Version: 11.7 © 5169-5339 [employer]. Care instructions adapted under license by [employer] (which disclaims liability or warranty for this information). If you have questions about a medical condition or this instruction, always ask your healthcare professional. [place] [address] any warranty or liability for your use of this information. Safe Use of Opioid Pain Medicine: Care Instructions Your Care Instructions Pain is your body's way of warning you that something is wrong. Pain feels different for everybody. Only you can describe your pain. A doctor can suggest or prescribe many types of medicines for pain. These range from over-the-counter medicines like acetaminophen (Tylenol) to powerful medicines called opioids. Examples of opioids are fentanyl, hydrocodone, morphine, and oxycodone. Heroin is an illegal opioid Opioids are strong medicines. They can help you manage pain when you use them the right way. But if you misuse them, they can cause serious harm and even death. For these reasons, doctors are very careful about how they prescribe opioids. If you decide to take opioids, here are some things to remember. · Keep your doctor informed. You can get addicted to opioids. The risk is higher if you have a history of substance use. Your doctor will monitor you closely for signs of misuse and addiction and to figure out when you no longer need to take opioids. · Make a treatment plan. The goal of your plan is to be able to function and do the things you need to do, even if you still have some pain. You might be able to manage your pain with other non-opioid options like physical therapy, relaxation, or over-the-counter pain medicines. · Be aware of the side effects. Opioids can cause serious side effects, such as constipation, dry mouth, and nausea. And over time, you may need a higher dose to get pain relief. This is called tolerance. Your body also gets used to opioids. This is called physical dependence. If you suddenly stop taking them, you may have withdrawal symptoms. The doctor carefully considered what pain medicine is right for you. You may not have received opioids if your doctor was concerned about drug interactions or your safety, or if he or she had other concerns. It is best to have one doctor or clinic treat your pain.  This way you will get the pain medicine that will help you the most. And a doctor will be able to watch for any problems that the medicine might cause. The doctor has checked you carefully, but problems can develop later. If you notice any problems or new symptoms,  get medical treatment right away. Follow-up care is a key part of your treatment and safety. Be sure to make and go to all appointments, and call your doctor if you are having problems. It's also a good idea to know your test results and keep a list of the medicines you take. How can you care for yourself at home? · If you need to take opioids to manage your pain, remember these safety tips. ¨ Follow directions carefully. It's easy to misuse opioids if you take a dose other than what's prescribed by your doctor. This can lead to overdose and even death. Even sharing them with someone they weren't meant for is misuse. ¨ Be cautious. Opioids may affect your judgment and decision making. Do not drive or operate machinery until you can think clearly. Talk with your doctor about when it is safe to drive. ¨ Reduce the risk of drug interactions. Opioids can be dangerous if you take them with alcohol or with certain drugs like sleeping pills and muscle relaxers. Make sure your doctor knows about all the other medicines you take, including over-the-counter medicines. Don't start any new medicines before you talk to your doctor or pharmacist. 
Sukhi Faye Keep others safe. Store opioids in a safe and secure place. Make sure that pets, children, friends, and family can't get to them. When you're done using opioids, make sure to properly dispose of them. You can either use a community drug take-back program or your drugstore's mail-back program. If one of these programs isn't available, you can flush opioid skin patches and unused opioid pills down the toilet. ¨ Reduce the risk of overdose. Misuse of opioids can be very dangerous. Protect yourself by asking your doctor about a naloxone rescue kit. It can help you-and even save your life-if you take too much of an opioid. · Try other ways to reduce pain. ¨ Relax, and reduce stress. Relaxation techniques such as deep breathing or meditation can help. ¨ Keep moving. Gentle, daily exercise can help reduce pain over the long run. Try low- or no-impact exercises such as walking, swimming, and stationary biking. Do stretches to stay flexible. ¨ Try heat, cold packs, and massage. ¨ Get enough sleep. Pain can make you tired and drain your energy. Talk with your doctor if you have trouble sleeping because of pain. ¨ Think positive. Your thoughts can affect your pain level. Do things that you enjoy to distract yourself when you have pain instead of focusing on the pain. See a movie, read a book, listen to music, or spend time with a friend. · If you are not taking a prescription pain medicine, ask your doctor if you can take an over-the-counter medicine. When should you call for help? Call your doctor now or seek immediate medical care if: 
  · You have a new kind of pain.  
  · You have new symptoms, such as a fever or rash, along with the pain.  
 Watch closely for changes in your health, and be sure to contact your doctor if: 
  · You think you might be using too much pain medicine, and you need help to use less or stop.  
  · Your pain gets worse.  
  · You would like a referral to a doctor or clinic that specializes in pain management. Where can you learn more? Go to http://uriel-percy.info/. Enter R108 in the search box to learn more about \"Safe Use of Opioid Pain Medicine: Care Instructions. \" Current as of: September 10, 2017 Content Version: 11.7 © 9016-2486 [employer]. Care instructions adapted under license by [employer] (which disclaims liability or warranty for this information).  If you have questions about a medical condition or this instruction, always ask your healthcare professional. [facility] any warranty or liability for your use of this information. Introducing [facility]! [facility] introduces [employer] patient portal. Now you can access parts of your medical record, email your doctor's office, and request medication refills online. 1. In your internet browser, go to [email or web address] 2. Click on the First Time User? Click Here link in the Sign In box. You will see the New Member Sign Up page. 3. Enter your [employer] Access Code exactly as it appears below. You will not need to use this code after youve completed the sign-up process. If you do not sign up before the expiration date, you must request a new code. · [employer] Access Code: [email or web address] Expires: 7/30/2018  4:49 PM 
 
4. Enter the last four digits of your Social Security Number (xxxx) and Date of Birth (mm/dd/yyyy) as indicated and click Submit. You will be taken to the next sign-up page. 5. Create a [employer] ID. This will be your [employer] login ID and cannot be changed, so think of one that is secure and easy to remember. 6. Create a [employer] password. You can change your password at any time. 7. Enter your Password Reset Question and Answer. This can be used at a later time if you forget your password. 8. Enter your e-mail address. You will receive e-mail notification when new information is available in [address] 9. Click Sign Up. You can now view and download portions of your medical record. 10. Click the Download Summary menu link to download a portable copy of your medical information. If you have questions, please visit the Frequently Asked Questions section of the [employer] website. Remember, [employer] is NOT to be used for urgent needs. For medical emergencies, dial 911. Now available from your iPhone and Android! Please provide this summary of care documentation to your next provider. Your primary care clinician is listed as PROVIDER UNKNOWN. If you have any questions after today's visit, please call [phone].
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
## 2018-07-10 NOTE — ED TRIAGE NOTES
Patient A/o x 4, complaining of chronic bilateral shoulder, lower back , right leg pain. Patient states she's in pain management and has an appointment tomorrow but is in a lot of pain so she couldn't wait. Patient complaining of having this pain x 6/2015, no new onset of pain patient is complaining of.

## 2018-07-10 NOTE — PROGRESS NOTES
Nursing Notes    Patient presents to the office today in follow-up. Patient rates her pain at 10/10 on the numerical pain scale. Reviewed medications with counts as follows:    Rx Date filled Qty Dispensed Pill Count Last Dose Short   Oxycodone 15 mg 06/7/18 90 0 7/8/18 no   Exalgo 8 mg 06/19/18 30 8 This a.m no                                POC UDS was performed in office today    Any new labs or imaging since last appointment? NO    Have you been to an emergency room (ER) or urgent care clinic since your last visit? NO            Have you been hospitalized since your last visit? NO     If yes, where, when, and reason for visit? Have you seen or consulted any other health care providers outside of the [address] [place] [address]  since your last visit? NO     If yes, where, when, and reason for visit? Ms. Nan Osgood has a reminder for a \"due or due soon\" health maintenance. I have asked that she contact her primary care provider for follow-up on this health maintenance. Patient stated that she does have Narcan.   PHQ over the last two weeks 7/10/2018   Little interest or pleasure in doing things Not at all   Feeling down, depressed or hopeless Not at all   Total Score PHQ 2 0   Trouble falling or staying asleep, or sleeping too much -   Feeling tired or having little energy -   Poor appetite or overeating -   Feeling bad about yourself - or that you are a failure or have let yourself or your family down -   Trouble concentrating on things such as school, work, reading or watching TV -   Moving or speaking so slowly that other people could have noticed; or the opposite being so fidgety that others notice -   Thoughts of being better off dead, or hurting yourself in some way -   PHQ 9 Score -   How difficult have these problems made it for you to do your work, take care of your home and get along with others -

## 2018-07-10 NOTE — PATIENT INSTRUCTIONS
Learning About Sleeping Well  What does sleeping well mean? Sleeping well means getting enough sleep. How much sleep is enough varies among people. The number of hours you sleep is not as important as how you feel when you wake up. If you do not feel refreshed, you probably need more sleep. Another sign of not getting enough sleep is feeling tired during the day. The average total nightly sleep time is 7½ to 8 hours. Healthy adults may need a little more or a little less than this. Why is getting enough sleep important? Getting enough quality sleep is a basic part of good health. When your sleep suffers, your mood and your thoughts can suffer too. You may find yourself feeling more grumpy or stressed. Not getting enough sleep also can lead to serious problems, including injury, accidents, anxiety, and depression. What might cause poor sleeping? Many things can cause sleep problems, including:  · Stress. Stress can be caused by fear about a single event, such as giving a speech. Or you may have ongoing stress, such as worry about work or school. · Depression, anxiety, and other mental or emotional conditions. · Changes in your sleep habits or surroundings. This includes changes that happen where you sleep, such as noise, light, or sleeping in a different bed. It also includes changes in your sleep pattern, such as having jet lag or working a late shift. · Health problems, such as pain, breathing problems, and restless legs syndrome. · Lack of regular exercise. How can you help yourself? Here are some tips that may help you sleep more soundly and wake up feeling more refreshed. Your sleeping area  · Use your bedroom only for sleeping and sex. A bit of light reading may help you fall asleep. But if it doesn't, do your reading elsewhere in the house. Don't watch TV in bed. · Be sure your bed is big enough to stretch out comfortably, especially if you have a sleep partner.   · Keep your bedroom quiet, dark, and cool. Use curtains, blinds, or a sleep mask to block out light. To block out noise, use earplugs, soothing music, or a \"white noise\" machine. Your evening and bedtime routine  · Create a relaxing bedtime routine. You might want to take a warm shower or bath, listen to soothing music, or drink a cup of noncaffeinated tea. · Go to bed at the same time every night. And get up at the same time every morning, even if you feel tired. What to avoid  · Limit caffeine (coffee, tea, caffeinated sodas) during the day, and don't have any for at least 4 to 6 hours before bedtime. · Don't drink alcohol before bedtime. Alcohol can cause you to wake up more often during the night. · Don't smoke or use tobacco, especially in the evening. Nicotine can keep you awake. · Don't take naps during the day, especially close to bedtime. · Don't lie in bed awake for too long. If you can't fall asleep, or if you wake up in the middle of the night and can't get back to sleep within 15 minutes or so, get out of bed and go to another room until you feel sleepy. · Don't take medicine right before bed that may keep you awake or make you feel hyper or energized. Your doctor can tell you if your medicine may do this and if you can take it earlier in the day. If you can't sleep  · Imagine yourself in a peaceful, pleasant scene. Focus on the details and feelings of being in a place that is relaxing. · Get up and do a quiet or boring activity until you feel sleepy. · Don't drink any liquids after 6 p.m. if you wake up often because you have to go to the bathroom. Where can you learn more? Go to http://uriel-percy.info/. Enter N510 in the search box to learn more about \"Learning About Sleeping Well. \"  Current as of: December 7, 2017  Content Version: 11.7  © 9488-7161 [employer] [facility].  Care instructions adapted under license by [employer] (which disclaims liability or warranty for this information). If you have questions about a medical condition or this instruction, always ask your healthcare professional. [address] any warranty or liability for your use of this information. Learning About Sleeping Well  What does sleeping well mean? Sleeping well means getting enough sleep. How much sleep is enough varies among people. The number of hours you sleep is not as important as how you feel when you wake up. If you do not feel refreshed, you probably need more sleep. Another sign of not getting enough sleep is feeling tired during the day. The average total nightly sleep time is 7½ to 8 hours. Healthy adults may need a little more or a little less than this. Why is getting enough sleep important? Getting enough quality sleep is a basic part of good health. When your sleep suffers, your mood and your thoughts can suffer too. You may find yourself feeling more grumpy or stressed. Not getting enough sleep also can lead to serious problems, including injury, accidents, anxiety, and depression. What might cause poor sleeping? Many things can cause sleep problems, including:  · Stress. Stress can be caused by fear about a single event, such as giving a speech. Or you may have ongoing stress, such as worry about work or school. · Depression, anxiety, and other mental or emotional conditions. · Changes in your sleep habits or surroundings. This includes changes that happen where you sleep, such as noise, light, or sleeping in a different bed. It also includes changes in your sleep pattern, such as having jet lag or working a late shift. · Health problems, such as pain, breathing problems, and restless legs syndrome. · Lack of regular exercise. How can you help yourself? Here are some tips that may help you sleep more soundly and wake up feeling more refreshed. Your sleeping area  · Use your bedroom only for sleeping and sex.  A bit of light reading may help you fall asleep. But if it doesn't, do your reading elsewhere in the house. Don't watch TV in bed. · Be sure your bed is big enough to stretch out comfortably, especially if you have a sleep partner. · Keep your bedroom quiet, dark, and cool. Use curtains, blinds, or a sleep mask to block out light. To block out noise, use earplugs, soothing music, or a \"white noise\" machine. Your evening and bedtime routine  · Create a relaxing bedtime routine. You might want to take a warm shower or bath, listen to soothing music, or drink a cup of noncaffeinated tea. · Go to bed at the same time every night. And get up at the same time every morning, even if you feel tired. What to avoid  · Limit caffeine (coffee, tea, caffeinated sodas) during the day, and don't have any for at least 4 to 6 hours before bedtime. · Don't drink alcohol before bedtime. Alcohol can cause you to wake up more often during the night. · Don't smoke or use tobacco, especially in the evening. Nicotine can keep you awake. · Don't take naps during the day, especially close to bedtime. · Don't lie in bed awake for too long. If you can't fall asleep, or if you wake up in the middle of the night and can't get back to sleep within 15 minutes or so, get out of bed and go to another room until you feel sleepy. · Don't take medicine right before bed that may keep you awake or make you feel hyper or energized. Your doctor can tell you if your medicine may do this and if you can take it earlier in the day. If you can't sleep  · Imagine yourself in a peaceful, pleasant scene. Focus on the details and feelings of being in a place that is relaxing. · Get up and do a quiet or boring activity until you feel sleepy. · Don't drink any liquids after 6 p.m. if you wake up often because you have to go to the bathroom. Where can you learn more? Go to http://uriel-percy.info/.   Enter H256 in the search box to learn more about \"Learning About Sleeping Well. \"  Current as of: December 7, 2017  Content Version: 11.7  © 1506-3247 "[employer]. Care instructions adapted under license by [employer] (which disclaims liability or warranty for this information). If you have questions about a medical condition or this instruction, always ask your healthcare professional. [address] any warranty or liability for your use of this information. Learning About Sleeping Well  What does sleeping well mean? Sleeping well means getting enough sleep. How much sleep is enough varies among people. The number of hours you sleep is not as important as how you feel when you wake up. If you do not feel refreshed, you probably need more sleep. Another sign of not getting enough sleep is feeling tired during the day. The average total nightly sleep time is 7½ to 8 hours. Healthy adults may need a little more or a little less than this. Why is getting enough sleep important? Getting enough quality sleep is a basic part of good health. When your sleep suffers, your mood and your thoughts can suffer too. You may find yourself feeling more grumpy or stressed. Not getting enough sleep also can lead to serious problems, including injury, accidents, anxiety, and depression. What might cause poor sleeping? Many things can cause sleep problems, including:  · Stress. Stress can be caused by fear about a single event, such as giving a speech. Or you may have ongoing stress, such as worry about work or school. · Depression, anxiety, and other mental or emotional conditions. · Changes in your sleep habits or surroundings. This includes changes that happen where you sleep, such as noise, light, or sleeping in a different bed. It also includes changes in your sleep pattern, such as having jet lag or working a late shift. · Health problems, such as pain, breathing problems, and restless legs syndrome.   · Lack of regular exercise. How can you help yourself? Here are some tips that may help you sleep more soundly and wake up feeling more refreshed. Your sleeping area  · Use your bedroom only for sleeping and sex. A bit of light reading may help you fall asleep. But if it doesn't, do your reading elsewhere in the house. Don't watch TV in bed. · Be sure your bed is big enough to stretch out comfortably, especially if you have a sleep partner. · Keep your bedroom quiet, dark, and cool. Use curtains, blinds, or a sleep mask to block out light. To block out noise, use earplugs, soothing music, or a \"white noise\" machine. Your evening and bedtime routine  · Create a relaxing bedtime routine. You might want to take a warm shower or bath, listen to soothing music, or drink a cup of noncaffeinated tea. · Go to bed at the same time every night. And get up at the same time every morning, even if you feel tired. What to avoid  · Limit caffeine (coffee, tea, caffeinated sodas) during the day, and don't have any for at least 4 to 6 hours before bedtime. · Don't drink alcohol before bedtime. Alcohol can cause you to wake up more often during the night. · Don't smoke or use tobacco, especially in the evening. Nicotine can keep you awake. · Don't take naps during the day, especially close to bedtime. · Don't lie in bed awake for too long. If you can't fall asleep, or if you wake up in the middle of the night and can't get back to sleep within 15 minutes or so, get out of bed and go to another room until you feel sleepy. · Don't take medicine right before bed that may keep you awake or make you feel hyper or energized. Your doctor can tell you if your medicine may do this and if you can take it earlier in the day. If you can't sleep  · Imagine yourself in a peaceful, pleasant scene. Focus on the details and feelings of being in a place that is relaxing. · Get up and do a quiet or boring activity until you feel sleepy.   · Don't drink any liquids after 6 p.m. if you wake up often because you have to go to the bathroom. Where can you learn more? Go to http://uriel-percy.info/. Enter P682 in the search box to learn more about \"Learning About Sleeping Well. \"  Current as of: December 7, 2017  Content Version: 11.7  © 2619-6663 [employer]. Care instructions adapted under license by [employer] (which disclaims liability or warranty for this information). If you have questions about a medical condition or this instruction, always ask your healthcare professional. [place] [address] any warranty or liability for your use of this information. Safe Use of Opioid Pain Medicine: Care Instructions  Your Care Instructions  Pain is your body's way of warning you that something is wrong. Pain feels different for everybody. Only you can describe your pain. A doctor can suggest or prescribe many types of medicines for pain. These range from over-the-counter medicines like acetaminophen (Tylenol) to powerful medicines called opioids. Examples of opioids are fentanyl, hydrocodone, morphine, and oxycodone. Heroin is an illegal opioid  Opioids are strong medicines. They can help you manage pain when you use them the right way. But if you misuse them, they can cause serious harm and even death. For these reasons, doctors are very careful about how they prescribe opioids. If you decide to take opioids, here are some things to remember. · Keep your doctor informed. You can get addicted to opioids. The risk is higher if you have a history of substance use. Your doctor will monitor you closely for signs of misuse and addiction and to figure out when you no longer need to take opioids. · Make a treatment plan. The goal of your plan is to be able to function and do the things you need to do, even if you still have some pain.  You might be able to manage your pain with other non-opioid options like physical therapy, relaxation, or over-the-counter pain medicines. · Be aware of the side effects. Opioids can cause serious side effects, such as constipation, dry mouth, and nausea. And over time, you may need a higher dose to get pain relief. This is called tolerance. Your body also gets used to opioids. This is called physical dependence. If you suddenly stop taking them, you may have withdrawal symptoms. The doctor carefully considered what pain medicine is right for you. You may not have received opioids if your doctor was concerned about drug interactions or your safety, or if he or she had other concerns. It is best to have one doctor or clinic treat your pain. This way you will get the pain medicine that will help you the most. And a doctor will be able to watch for any problems that the medicine might cause. The doctor has checked you carefully, but problems can develop later. If you notice any problems or new symptoms,  get medical treatment right away. Follow-up care is a key part of your treatment and safety. Be sure to make and go to all appointments, and call your doctor if you are having problems. It's also a good idea to know your test results and keep a list of the medicines you take. How can you care for yourself at home? · If you need to take opioids to manage your pain, remember these safety tips. ¨ Follow directions carefully. It's easy to misuse opioids if you take a dose other than what's prescribed by your doctor. This can lead to overdose and even death. Even sharing them with someone they weren't meant for is misuse. ¨ Be cautious. Opioids may affect your judgment and decision making. Do not drive or operate machinery until you can think clearly. Talk with your doctor about when it is safe to drive. ¨ Reduce the risk of drug interactions. Opioids can be dangerous if you take them with alcohol or with certain drugs like sleeping pills and muscle relaxers.  Make sure your doctor knows about all the other medicines you take, including over-the-counter medicines. Don't start any new medicines before you talk to your doctor or pharmacist.  [place] Keep others safe. Store opioids in a safe and secure place. Make sure that pets, children, friends, and family can't get to them. When you're done using opioids, make sure to properly dispose of them. You can either use a community drug take-back program or your drugstore's mail-back program. If one of these programs isn't available, you can flush opioid skin patches and unused opioid pills down the toilet. ¨ Reduce the risk of overdose. Misuse of opioids can be very dangerous. Protect yourself by asking your doctor about a naloxone rescue kit. It can help you-and even save your life-if you take too much of an opioid. · Try other ways to reduce pain. ¨ Relax, and reduce stress. Relaxation techniques such as deep breathing or meditation can help. ¨ Keep moving. Gentle, daily exercise can help reduce pain over the long run. Try low- or no-impact exercises such as walking, swimming, and stationary biking. Do stretches to stay flexible. ¨ Try heat, cold packs, and massage. ¨ Get enough sleep. Pain can make you tired and drain your energy. Talk with your doctor if you have trouble sleeping because of pain. ¨ Think positive. Your thoughts can affect your pain level. Do things that you enjoy to distract yourself when you have pain instead of focusing on the pain. See a movie, read a book, listen to music, or spend time with a friend. · If you are not taking a prescription pain medicine, ask your doctor if you can take an over-the-counter medicine. When should you call for help?   Call your doctor now or seek immediate medical care if:    · You have a new kind of pain.     · You have new symptoms, such as a fever or rash, along with the pain.    Watch closely for changes in your health, and be sure to contact your doctor if:    · You think you might be using too much pain medicine, and you need help to use less or stop.     · Your pain gets worse.     · You would like a referral to a doctor or clinic that specializes in pain management. Where can you learn more? Go to http://uriel-percy.info/. Enter R108 in the search box to learn more about \"Safe Use of Opioid Pain Medicine: Care Instructions. \"  Current as of: September 10, 2017  Content Version: 11.7  © 9001-8409 [employer] Care instructions adapted under license by [employer] (which disclaims liability or warranty for this information). If you have questions about a medical condition or this instruction, always ask your healthcare professional. [place] [address] any warranty or liability for your use of this information.

## 2018-07-10 NOTE — PROGRESS NOTES
Referral  source Worker[employer] for low back pain and neck pain. HPI:  Zack Morton is a 50 y.o. female here for f/u visit for ongoing evaluation of low back pain and neck pain. Pt was last seen here on 06/07/18. Pt denies interval changes on the character or distribution of pain. Pain is located widespread primarily right side from shoulder to foot, bilateral shoulders and neck, across lumbosacral beltline. The pain is described as burning, throbbing, stabbing. Pain at its best is 9/10. Pain at its worse is 10/10. The pain is worsened by having opioid medications, increased movement. Symptoms are relieved by opioid medications. Patient given information on other pain management clinics in the area.     Social History     Social History    Marital status:      Spouse name: N/A    Number of children: N/A    Years of education: N/A     Occupational History    nurse assistant      Social History Main Topics    Smoking status: Former Smoker     Types: Cigarettes    Smokeless tobacco: Never Used      Comment: Smoke for 26 years    Alcohol use No    Drug use: No    Sexual activity: Yes     Partners: Male     Birth control/ protection: None     Other Topics Concern    Not on file     Social History Narrative     Family History   Problem Relation Age of Onset    Heart Attack Mother     Heart Disease Mother     Diabetes Mother     Pacemaker Mother     Stroke Father     Cancer Father     Hypertension Maternal Grandmother     Heart Attack Maternal Grandmother     Diabetes Maternal Grandmother     Cancer Maternal Grandmother     Hypertension Maternal Grandfather     Diabetes Maternal Grandfather     Hypertension Paternal Grandmother     Diabetes Paternal Grandmother     Breast Cancer Paternal Grandmother     Hypertension Paternal Grandfather     Diabetes Paternal Grandfather     Heart Attack Sister     Breast Cancer Maternal Aunt     Cancer Maternal Aunt     Breast Cancer Paternal Aunt     Cancer Maternal Uncle     Cancer Niece      Allergies   Allergen Reactions    Latex Rash    Lisinopril Cough     Ace Inhibitor - cough     Past Medical History:   Diagnosis Date    Abnormal nuclear cardiac imaging test 07/10/2012    Mod mid to distal anterior septal ischemia. EF 47%. Mid to distal anterior septal hypk. Normal EKG portion of stress test.  Intermediate high risk.  Asthma     Back pain     injury at work June 2014    Coronary atherosclerosis of native coronary artery     angiographically normal coronaries with dLAD bridging (july 2012)    Diabetes mellitus type II, controlled ([address]     Forgetfulness     since injury June 2014    Fracture of left humerus     GERD (gastroesophageal reflux disease)     Gout     Headache(784.0)     Hypertension     Lack of coordination     since injury June 2014    Neck pain     injury at work June 2014    Obesity     S/P cardiac cath 07/10/2012    dLAD w/mild to mod bridging. Otherwise patent coronaries. At least mod LVH. EF 65%. Past Surgical History:   Procedure Laterality Date    HX APPENDECTOMY      HX GI      Gallbladder    HX HEART CATHETERIZATION  7/10/2012    HX ORTHOPAEDIC      left arm     Current Outpatient Prescriptions on File Prior to Visit   Medication Sig    nortriptyline (PAMELOR) 50 mg capsule Take 1 Cap by mouth nightly.  TENS Units (TENS 504) gee Please provide patient with a TENS unit to use on her lumbosacral region and her cervicothoracic region for chronic pain issues. Indications: For chronic lumbar and cervical pain. Indications: Chronic Pain    senna-docusate (PERICOLACE) 8.6-50 mg per tablet One tab po daily as needed for constipation.  tens unit electrodes (TENS UNITS ELECTRODES) 2X2 \" pads For chronic lumbar and cervical pain. Indications: Chronic Pain    metFORMIN (GLUCOPHAGE) 1,000 mg tablet Take 1 Tab by mouth two (2) times daily (with meals).     carvedilol (COREG) 25 mg tablet TAKE ONE TABLET BY MOUTH TWICE DAILY    cloNIDine HCl (CATAPRES) 0.1 mg tablet TAKE ONE TABLET BY MOUTH TWICE DAILY    spironolactone (ALDACTONE) 25 mg tablet TAKE TWO TABLETS BY MOUTH ONCE DAILY (Patient taking differently: 25 mg. TAKE TWO TABLETS BY MOUTH ONCE DAILY)    allopurinol (ZYLOPRIM) 100 mg tablet Take 1 Tab by mouth two (2) times a day.  lovastatin (MEVACOR) 20 mg tablet Take 1 Tab by mouth nightly.  naloxone 4 mg/actuation spry 4 mg by Nasal route as needed for up to 2 doses. Indications: OPIOID TOXICITY    oxybutynin (DITROPAN) 5 mg tablet Take 1 Tab by mouth two (2) times a day.  ondansetron hcl (ZOFRAN, AS HYDROCHLORIDE,) 8 mg tablet Take 1 Tab by mouth every eight (8) hours as needed for Nausea.  glucose blood VI test strips (RELION PRIME TEST STRIPS) strip Use as directed    albuterol (PROVENTIL HFA, VENTOLIN HFA, PROAIR HFA) 90 mcg/actuation inhaler Take 2 Puffs by inhalation every four (4) hours as needed for Wheezing.  OTHER Motorized Wheelchair    multivitamin (ONE A DAY) tablet Take 1 tablet by mouth daily.  ascorbic acid (VITAMIN C) 250 mg tablet Take 250 mg by mouth daily.  Cholecalciferol, Vitamin D3, 5,000 unit Tab Take  by mouth daily.  cyanocobalamin 2,000 mcg TbER Take  by mouth daily.  lactobacillus acidophilus (ACIDOPHILUS) 500 million cell Tab Take  by mouth daily.  acetaminophen (TYLENOL) 500 mg tablet Take 2 Tabs by mouth three (3) times daily as needed for Pain (1-2 tabs tid prn pain) for up to 90 days. Indications: BACK PAIN, Pain    docusate sodium (COLACE) 100 mg capsule Take 1 Cap by mouth two (2) times daily as needed for Constipation for up to 90 days.      Current Facility-Administered Medications on File Prior to Visit   Medication    [COMPLETED] HYDROmorphone (PF) (DILAUDID) injection 2 mg    [DISCONTINUED] HYDROmorphone (PF) (DILAUDID) injection 2 mg    [DISCONTINUED] HYDROmorphone (PF) (DILAUDID) injection 2 mg    [DISCONTINUED] HYDROmorphone (PF) (DILAUDID) injection 2 mg        ROS  Denies fever, chills, nausea, vomiting, diarrhea ,constipation, chest pain, shortness breath, abdominal pain, weakness, trouble swallowing, changes in visit, changes in hearing, falls, dizziness, bowel and bladder incontinence, depression, anxiety, thoughts to harm yourself, thoughts to harm others, alcohol use. Opioid specific risk: GERD. Vitals:    07/10/18 1605 07/10/18 1611   BP: (!) 148/102 140/89   Pulse: 92 92   Resp: 18    Temp: 97.3 °F (36.3 °C)    TempSrc: Oral    Weight: 110.7 kg (244 lb)    Height: 5' 11\" (1.803 m)    PainSc:  10 - Worst pain ever    PainLoc: Back         Imaging: Awaiting patient to obtain      PE:  Physical Exam    AFVSS, no acute distress, normal body habitus. A&OXs 3. Normocephalic, atraumatic. Conjugate gaze, clear sclerae. Speech is slurred. Patient was tearful. Mild left-sided facial droop. Heart rate regular rate and rhythm, S1 and S2 noted, no murmur noted. Lungs clear to auscultation bilaterally, nonlabored breathing, no acute distress. Entire spine tender to palpation. Bilateral SIJ, bilateral GT tender to palpation. Gait and balance with assist of cane in right hand. Exam limited by patient's ability to get onto exam table due to pain. Calculated MEQ - 99.5 decreased to 83.5  Naloxone rescue - Yes  Prophylactic bowel program - no  Date of last OCA 12/21/17  Last UDS today, awaiting results   date checked today, findings consistent    Primary Care Physician  PROVIDER UNKNOWN  Patient not available to ask  None    Today   Last Visit  [facility] -    [facility] -    70%  [facility] -     COMM -   8    PHQ -- .   PHQ over the last two weeks 7/10/2018   Little interest or pleasure in doing things Not at all   Feeling down, depressed or hopeless Not at all   Total Score PHQ 2 0   Trouble falling or staying asleep, or sleeping too much -   Feeling tired or having little energy -   Poor appetite or overeating -   Feeling bad about yourself - or that you are a failure or have let yourself or your family down -   Trouble concentrating on things such as school, work, reading or watching TV -   Moving or speaking so slowly that other people could have noticed; or the opposite being so fidgety that others notice -   Thoughts of being better off dead, or hurting yourself in some way -   PHQ 9 Score -   How difficult have these problems made it for you to do your work, take care of your home and get along with others -       DSM V-OUD Screen -deferred to next visit    Assessment/Plan:     ICD-10-CM ICD-9-CM    1. Low back pain, unspecified back pain laterality, unspecified chronicity, with sciatica presence unspecified M54.5 724.2 HYDROmorphone (DILAUDID) 4 mg tablet      oxyCODONE IR (OXY-IR) 15 mg immediate release tablet   2. Encounter for long-term (current) use of high-risk medication Z79.899 V58.69 DRUG SCREEN      AMB POC DRUG SCREEN ()   3. Other cervical disc degeneration, unspecified cervical region M50.30 722.4    4. Other intervertebral disc degeneration, lumbosacral region M51.37 722.52    5. High risk medication use Z79.899 V58.69    6. Neck pain M54.2 723.1 HYDROmorphone (DILAUDID) 4 mg tablet      oxyCODONE IR (OXY-IR) 15 mg immediate release tablet   7. Chronic pain syndrome G89.4 338.4 HYDROmorphone (DILAUDID) 4 mg tablet      oxyCODONE IR (OXY-IR) 15 mg immediate release tablet        hydromorphone extended release 8 mg tab one tablet daily for pain. Hydromorphone extended release 4 mg tab one tablet for pain daily. Next visit plan to decrease hydromorphone extended release to 2 mg tablet 1 tablet daily for pain. Plan to decrease opioids by approximately 10% monthly with an end goal of 0 opioids. With regards to patient safety tolerance. Patient to call if she experiences any withdrawal symptoms.     oxycodone immediate release 15 mg to be used up to 3 times daily as needed for chronic pain.  We will keep the same as decreasing extended release opioid at this time. Patient to try and obtain TENS unit, to obtain imaging that was placed on last visit, to follow-up with neurology as soon as possible all were previously ordered and she has not followed up yet. Patient to take over-the-counter Tylenol 1-2 tablets by mouth up to 3 times a day as needed for pain. Patient to continue to look into acupuncture treatment as she was referred to previous visit and has not followed up. UDS today     Patient to obtain nortriptyline was refilled previous visit patient did not obtain it.     Consider need for ongoing speech therapy treatment for dysarthria without dysphasia and subjective reports of dysphagia. Follow up ongoing assessment and ongoing development of integrative and comprehensive plan of care for chronic pain. GOALS:  To establish complementary and integrative plan of care to address chronic pain issues while minimizing pharmaceuticals to maximize patient's function improve quality of life. Education:  Patient again educated on the importance of strict compliance with the opioid care agreement while on opioid therapy. Patient also again educated that they should avoid driving while on chronic opioid therapy. Also advised to avoid alcohol and to avoid benzodiazepines while on opioid therapy. Handouts given for sleep health and opioid safety. F/u:. Follow-up Disposition:  Return in about 4 weeks (around 8/7/2018) for 30 mins.

## 2018-08-10 ENCOUNTER — OFFICE VISIT (OUTPATIENT)
Dept: PAIN MANAGEMENT | Age: 53
End: 2018-08-10

## 2018-08-10 VITALS
RESPIRATION RATE: 16 BRPM | HEART RATE: 86 BPM | DIASTOLIC BLOOD PRESSURE: 84 MMHG | SYSTOLIC BLOOD PRESSURE: 118 MMHG | HEIGHT: 71 IN | TEMPERATURE: 97 F | BODY MASS INDEX: 30.52 KG/M2 | WEIGHT: 218 LBS

## 2018-08-10 DIAGNOSIS — M54.2 NECK PAIN: ICD-10-CM

## 2018-08-10 DIAGNOSIS — M50.30 OTHER CERVICAL DISC DEGENERATION, UNSPECIFIED CERVICAL REGION: ICD-10-CM

## 2018-08-10 DIAGNOSIS — M54.9 CHRONIC BACK PAIN, UNSPECIFIED BACK LOCATION, UNSPECIFIED BACK PAIN LATERALITY: Primary | ICD-10-CM

## 2018-08-10 DIAGNOSIS — G89.29 CHRONIC BACK PAIN, UNSPECIFIED BACK LOCATION, UNSPECIFIED BACK PAIN LATERALITY: Primary | ICD-10-CM

## 2018-08-10 DIAGNOSIS — G89.4 CHRONIC PAIN SYNDROME: ICD-10-CM

## 2018-08-10 DIAGNOSIS — Z79.899 ENCOUNTER FOR LONG-TERM (CURRENT) USE OF HIGH-RISK MEDICATION: ICD-10-CM

## 2018-08-10 RX ORDER — OXYCODONE HYDROCHLORIDE 15 MG/1
15 TABLET ORAL
Qty: 90 TAB | Refills: 0 | Status: SHIPPED | OUTPATIENT
Start: 2018-08-10 | End: 2018-09-04 | Stop reason: SDUPTHER

## 2018-08-10 RX ORDER — HYDROMORPHONE HYDROCHLORIDE 2 MG/1
2 TABLET ORAL DAILY
Qty: 30 TAB | Refills: 0 | Status: SHIPPED | OUTPATIENT
Start: 2018-08-14 | End: 2018-09-13

## 2018-08-10 NOTE — MR AVS SNAPSHOT
[address] [facility] [address] 
[phone] Patient: Apoorva Farooq MRN: [id number] IRB:[id number] Visit Information Date & Time Provider Department Dept. Phone Encounter #  
 8/10/2018  2:00 PM Krysten Thibodeaux NP [address] [place] [address] for Pain Management [phone] Follow-up Instructions Return in about 12 weeks (around 11/2/2018). Your Appointments 11/1/2018  3:00 PM  
Office Visit with Krysten Thibodeaux NP [address] [place] [address] for Pain Management (TUCKER SCHEDULING) Appt Note: Return in about 12 weeks (around 11/2/2018). [address] [facility] [address]  
[phone] [address] [facility]  
  
    
 12/17/2018  3:40 PM  
Follow Up with Luis Felipe Sanchez MD  
[address] [place] [address]) Appt Note: 6mon f/u  
 [address] [address] [phone]  
  
   
 [facility] [address] Upcoming Health Maintenance Date Due Pneumococcal 19-64 Medium Risk (1 of 1 - PPSV23) 9/13/1986 MICROALBUMIN Q1 8/31/2016 FOBT Q 1 YEAR AGE 50-75 9/13/2017 HEMOGLOBIN A1C Q6M 11/17/2017 MEDICARE YEARLY EXAM 3/28/2018 FOOT EXAM Q1 5/17/2018 LIPID PANEL Q1 5/17/2018 Influenza Age 5 to Adult 8/1/2018 EYE EXAM RETINAL OR DILATED Q1 1/18/2019 BREAST CANCER SCRN MAMMOGRAM 3/31/2019 PAP AKA CERVICAL CYTOLOGY 5/17/2020 DTaP/Tdap/Td series (2 - Td) 8/31/2025 Allergies as of 8/10/2018  Review Complete On: 8/10/2018 By: Krysten Thibodeaux NP Severity Noted Reaction Type Reactions Latex  08/26/2014    Rash Lisinopril  06/07/2012   Intolerance Cough Ace Inhibitor - cough Current Immunizations  Reviewed on 8/31/2015 Name Date Influenza Vaccine 9/25/2015, 10/30/2013 Tdap 8/31/2015, 5/22/2014  6:52 PM  
  
 Not reviewed this visit You Were Diagnosed With   
  
 Codes Comments Low back pain, unspecified back pain laterality, unspecified chronicity, with sciatica presence unspecified    -  Primary ICD-10-CM: M54.5 ICD-9-CM: 724.2 Encounter for long-term (current) use of high-risk medication     ICD-10-CM: Z79.899 ICD-9-CM: V58.69 Other cervical disc degeneration, unspecified cervical region     ICD-10-CM: M50.30 ICD-9-CM: 722.4 Other intervertebral disc degeneration, lumbosacral region     ICD-10-CM: M51.37 
ICD-9-CM: 722.52 High risk medication use     ICD-10-CM: Z79.899 ICD-9-CM: V58.69 Neck pain     ICD-10-CM: M54.2 ICD-9-CM: 723.1 Chronic pain syndrome     ICD-10-CM: G89.4 ICD-9-CM: 338. 4 Vitals BP Pulse Temp Resp Height(growth percentile) Weight(growth percentile) 118/84 (BP 1 Location: Left arm, BP Patient Position: Sitting) 86 97 °F (36.1 °C) (Oral) 16 5' 11\" (1.803 m) 218 lb (98.9 kg) BMI OB Status Smoking Status 30.4 kg/m2 Unknown Former Smoker Vitals History BMI and BSA Data Body Mass Index Body Surface Area  
 30.4 kg/m 2 2.23 m 2 Preferred Pharmacy Pharmacy Name Phone [address] [phone] Your Updated Medication List  
  
   
This list is accurate as of 8/10/18  3:54 PM.  Always use your most recent med list.  
  
  
  
  
 albuterol 90 mcg/actuation inhaler Commonly known as:  PROVENTIL HFA, VENTOLIN HFA, PROAIR HFA Take 2 Puffs by inhalation every four (4) hours as needed for Wheezing. allopurinol 100 mg tablet Commonly known as:  Kay Carpenterischer Take 1 Tab by mouth two (2) times a day. carvedilol 25 mg tablet Commonly known as:  COREG  
TAKE ONE TABLET BY MOUTH TWICE DAILY  
  
 cloNIDine HCl 0.1 mg tablet Commonly known as:  CATAPRES  
TAKE ONE TABLET BY MOUTH TWICE DAILY  
  
 glucose blood VI test strips strip Commonly known as:  RELION PRIME TEST STRIPS  
 Use as directed HYDROmorphone 2 mg tablet Commonly known as:  DILAUDID Take 1 Tab by mouth daily for 30 days. Max Daily Amount: 2 mg. Start taking on:  8/14/2018  
  
 lovastatin 20 mg tablet Commonly known as:  MEVACOR Take 1 Tab by mouth nightly. metFORMIN 1,000 mg tablet Commonly known as:  GLUCOPHAGE Take 1 Tab by mouth two (2) times daily (with meals). multivitamin tablet Commonly known as:  ONE A DAY Take 1 tablet by mouth daily. naloxone 4 mg/actuation nasal spray Commonly known as:  NARCAN  
4 mg by Nasal route as needed for up to 2 doses. Indications: OPIOID TOXICITY  
  
 nortriptyline 50 mg capsule Commonly known as:  PAMELOR Take 1 Cap by mouth nightly. OTHER Motorized Wheelchair  
  
 oxybutynin 5 mg tablet Commonly known as:  SUKMNZZY Take 1 Tab by mouth two (2) times a day. oxyCODONE IR 15 mg immediate release tablet Commonly known as:  OXY-IR Take 1 Tab by mouth every eight (8) hours as needed for Pain for up to 30 days. Max Daily Amount: 45 mg.  
  
 spironolactone 25 mg tablet Commonly known as:  ALDACTONE  
TAKE TWO TABLETS BY MOUTH ONCE DAILY  
  
 TENS Units [facility] Commonly known as:  TENS 504 Please provide patient with a TENS unit to use on her lumbosacral region and her cervicothoracic region for chronic pain issues. Indications: For chronic lumbar and cervical pain. Indications: Chronic Pain TENS Units Electrodes 2X2 \" Pads For chronic lumbar and cervical pain. Indications: Chronic Pain Prescriptions Printed Refills HYDROmorphone (DILAUDID) 2 mg tablet 0 Starting on: 8/14/2018 Sig: Take 1 Tab by mouth daily for 30 days. Max Daily Amount: 2 mg. Class: Print Route: Oral  
 oxyCODONE IR (OXY-IR) 15 mg immediate release tablet 0 Sig: Take 1 Tab by mouth every eight (8) hours as needed for Pain for up to 30 days. Max Daily Amount: 45 mg.  
 Class: Print  Route: Oral  
  
 Follow-up Instructions Return in about 12 weeks (around 11/2/2018). Patient Instructions Learning About Sleeping Well What does sleeping well mean? Sleeping well means getting enough sleep. How much sleep is enough varies among people. The number of hours you sleep is not as important as how you feel when you wake up. If you do not feel refreshed, you probably need more sleep. Another sign of not getting enough sleep is feeling tired during the day. The average total nightly sleep time is 7½ to 8 hours. Healthy adults may need a little more or a little less than this. Why is getting enough sleep important? Getting enough quality sleep is a basic part of good health. When your sleep suffers, your mood and your thoughts can suffer too. You may find yourself feeling more grumpy or stressed. Not getting enough sleep also can lead to serious problems, including injury, accidents, anxiety, and depression. What might cause poor sleeping? Many things can cause sleep problems, including: · Stress. Stress can be caused by fear about a single event, such as giving a speech. Or you may have ongoing stress, such as worry about work or school. · Depression, anxiety, and other mental or emotional conditions. · Changes in your sleep habits or surroundings. This includes changes that happen where you sleep, such as noise, light, or sleeping in a different bed. It also includes changes in your sleep pattern, such as having jet lag or working a late shift. · Health problems, such as pain, breathing problems, and restless legs syndrome. · Lack of regular exercise. How can you help yourself? Here are some tips that may help you sleep more soundly and wake up feeling more refreshed. Your sleeping area · Use your bedroom only for sleeping and sex. A bit of light reading may help you fall asleep. But if it doesn't, do your reading elsewhere in the house. Don't watch TV in bed. · Be sure your bed is big enough to stretch out comfortably, especially if you have a sleep partner. · Keep your bedroom quiet, dark, and cool. Use curtains, blinds, or a sleep mask to block out light. To block out noise, use earplugs, soothing music, or a \"white noise\" machine. Your evening and bedtime routine · Create a relaxing bedtime routine. You might want to take a warm shower or bath, listen to soothing music, or drink a cup of noncaffeinated tea. · Go to bed at the same time every night. And get up at the same time every morning, even if you feel tired. What to avoid · Limit caffeine (coffee, tea, caffeinated sodas) during the day, and don't have any for at least 4 to 6 hours before bedtime. · Don't drink alcohol before bedtime. Alcohol can cause you to wake up more often during the night. · Don't smoke or use tobacco, especially in the evening. Nicotine can keep you awake. · Don't take naps during the day, especially close to bedtime. · Don't lie in bed awake for too long. If you can't fall asleep, or if you wake up in the middle of the night and can't get back to sleep within 15 minutes or so, get out of bed and go to another room until you feel sleepy. · Don't take medicine right before bed that may keep you awake or make you feel hyper or energized. Your doctor can tell you if your medicine may do this and if you can take it earlier in the day. If you can't sleep · Imagine yourself in a peaceful, pleasant scene. Focus on the details and feelings of being in a place that is relaxing. · Get up and do a quiet or boring activity until you feel sleepy. · Don't drink any liquids after 6 p.m. if you wake up often because you have to go to the bathroom. Where can you learn more? Go to http://uriel-percy.info/. Enter Q232 in the search box to learn more about \"Learning About Sleeping Well. \" Current as of: December 7, 2017 Content Version: 11.7 © 8178-2689 Healthwise, [employer]. Care instructions adapted under license by [employer] (which disclaims liability or warranty for this information). If you have questions about a medical condition or this instruction, always ask your healthcare professional. [address] any warranty or liability for your use of this information. Safe Use of Opioid Pain Medicine: Care Instructions Your Care Instructions Pain is your body's way of warning you that something is wrong. Pain feels different for everybody. Only you can describe your pain. A doctor can suggest or prescribe many types of medicines for pain. These range from over-the-counter medicines like acetaminophen (Tylenol) to powerful medicines called opioids. Examples of opioids are fentanyl, hydrocodone, morphine, and oxycodone. Heroin is an illegal opioid Opioids are strong medicines. They can help you manage pain when you use them the right way. But if you misuse them, they can cause serious harm and even death. For these reasons, doctors are very careful about how they prescribe opioids. If you decide to take opioids, here are some things to remember. · Keep your doctor informed. You can get addicted to opioids. The risk is higher if you have a history of substance use. Your doctor will monitor you closely for signs of misuse and addiction and to figure out when you no longer need to take opioids. · Make a treatment plan. The goal of your plan is to be able to function and do the things you need to do, even if you still have some pain. You might be able to manage your pain with other non-opioid options like physical therapy, relaxation, or over-the-counter pain medicines. · Be aware of the side effects. Opioids can cause serious side effects, such as constipation, dry mouth, and nausea. And over time, you may need a higher dose to get pain relief. This is called tolerance.  Your body also gets used to opioids. This is called physical dependence. If you suddenly stop taking them, you may have withdrawal symptoms. The doctor carefully considered what pain medicine is right for you. You may not have received opioids if your doctor was concerned about drug interactions or your safety, or if he or she had other concerns. It is best to have one doctor or clinic treat your pain. This way you will get the pain medicine that will help you the most. And a doctor will be able to watch for any problems that the medicine might cause. The doctor has checked you carefully, but problems can develop later. If you notice any problems or new symptoms,  get medical treatment right away. Follow-up care is a key part of your treatment and safety. Be sure to make and go to all appointments, and call your doctor if you are having problems. It's also a good idea to know your test results and keep a list of the medicines you take. How can you care for yourself at home? · If you need to take opioids to manage your pain, remember these safety tips. ¨ Follow directions carefully. It's easy to misuse opioids if you take a dose other than what's prescribed by your doctor. This can lead to overdose and even death. Even sharing them with someone they weren't meant for is misuse. ¨ Be cautious. Opioids may affect your judgment and decision making. Do not drive or operate machinery until you can think clearly. Talk with your doctor about when it is safe to drive. ¨ Reduce the risk of drug interactions. Opioids can be dangerous if you take them with alcohol or with certain drugs like sleeping pills and muscle relaxers. Make sure your doctor knows about all the other medicines you take, including over-the-counter medicines. Don't start any new medicines before you talk to your doctor or pharmacist. 
[facility] Keep others safe. Store opioids in a safe and secure place.  Make sure that pets, children, friends, and family can't get to them. When you're done using opioids, make sure to properly dispose of them. You can either use a community drug take-back program or your drugstore's mail-back program. If one of these programs isn't available, you can flush opioid skin patches and unused opioid pills down the toilet. ¨ Reduce the risk of overdose. Misuse of opioids can be very dangerous. Protect yourself by asking your doctor about a naloxone rescue kit. It can help you-and even save your life-if you take too much of an opioid. · Try other ways to reduce pain. ¨ Relax, and reduce stress. Relaxation techniques such as deep breathing or meditation can help. ¨ Keep moving. Gentle, daily exercise can help reduce pain over the long run. Try low- or no-impact exercises such as walking, swimming, and stationary biking. Do stretches to stay flexible. ¨ Try heat, cold packs, and massage. ¨ Get enough sleep. Pain can make you tired and drain your energy. Talk with your doctor if you have trouble sleeping because of pain. ¨ Think positive. Your thoughts can affect your pain level. Do things that you enjoy to distract yourself when you have pain instead of focusing on the pain. See a movie, read a book, listen to music, or spend time with a friend. · If you are not taking a prescription pain medicine, ask your doctor if you can take an over-the-counter medicine. When should you call for help? Call your doctor now or seek immediate medical care if: 
  · You have a new kind of pain.  
  · You have new symptoms, such as a fever or rash, along with the pain.  
 Watch closely for changes in your health, and be sure to contact your doctor if: 
  · You think you might be using too much pain medicine, and you need help to use less or stop.  
  · Your pain gets worse.  
  · You would like a referral to a doctor or clinic that specializes in pain management. Where can you learn more? Go to http://uriel-percy.info/. Enter R108 in the search box to learn more about \"Safe Use of Opioid Pain Medicine: Care Instructions. \" Current as of: September 10, 2017 Content Version: 11.7 © 4563-6215 Healthwise, Inc[employer]. Care instructions adapted under license by [employer] (which disclaims liability or warranty for this information). If you have questions about a medical condition or this instruction, always ask your healthcare professional. [address] any warranty or liability for your use of this information. Introducing [facility]! Дмитрий Hamilton introduces [employer] patient portal. Now you can access parts of your medical record, email your doctor's office, and request medication refills online. 1. In your internet browser, go to [email or web address]. [email or web address] [place] 2. Click on the First Time User? Click Here link in the Sign In box. You will see the New Member Sign Up page. 3. Enter your [employer] Access Code exactly as it appears below. You will not need to use this code after youve completed the sign-up process. If you do not sign up before the expiration date, you must request a new code. · [employer] Access Code: [email or web address] Expires: 11/8/2018  3:54 PM 
 
4. Enter the last four digits of your Social Security Number (xxxx) and Date of Birth (mm/dd/yyyy) as indicated and click Submit. You will be taken to the next sign-up page. 5. Create a [employer] ID. This will be your [employer] login ID and cannot be changed, so think of one that is secure and easy to remember. 6. Create a [employer] password. You can change your password at any time. 7. Enter your Password Reset Question and Answer. This can be used at a later time if you forget your password. 8. Enter your e-mail address. You will receive e-mail notification when new information is available in [address] 9. Click Sign Up. You can now view and download portions of your medical record. 10. Click the Download Summary menu link to download a portable copy of your medical information. If you have questions, please visit the Frequently Asked Questions section of the [employer] website. Remember, [employer] is NOT to be used for urgent needs. For medical emergencies, dial 911. Now available from your iPhone and Android! Please provide this summary of care documentation to your next provider. Your primary care clinician is listed as PROVIDER UNKNOWN. If you have any questions after today's visit, please call [phone].

## 2018-08-10 NOTE — PROGRESS NOTES
Nursing Notes    Patient presents to the office today in follow-up. Patient rates her pain at 8/10 on the numerical pain scale. Reviewed medications with counts as follows:      Rx Date filled Qty Dispensed Pill Count Last Dose Short   Hydromorphone 4 mg tab 7/16/18 30 7 today no   Oxycodone HCL 15 mg tab (pt didn't bring bottle) 7/10/18 90 1 today no                   Patient did not bring Oxycodone HCL 15 mg tab bottle to today's visit. Patient stated that the bottle fell in the toilet, and the bottle is covered with stool, and that is why she didn't bring it. Patient states if necessary, she will go back home to  prescription bottle. I told the patient I would inform the the provider.  shows last fill date as 7/10/18. The pill the patient brought today was concurrent with what she was suppose to have according to pill [email or web address]. Comments:       POC UDS was not performed in office today    Any new labs or imaging since last appointment? NO    Have you been to an emergency room (ER) or urgent care clinic since your last visit? NO            Have you been hospitalized since your last visit? NO     If yes, where, when, and reason for visit? Have you seen or consulted any other health care providers outside of the [facility]  since your last visit? NO     If yes, where, when, and reason for visit? Ms. Andrez Anderson has a reminder for a \"due or due soon\" health maintenance. I have asked that she contact her primary care provider for follow-up on this health maintenance.     PHQ over the last two weeks 8/10/2018   Little interest or pleasure in doing things Not at all   Feeling down, depressed, irritable, or hopeless Not at all   Total Score PHQ 2 0   Trouble falling or staying asleep, or sleeping too much -   Feeling tired or having little energy -   Poor appetite, weight loss, or overeating -   Feeling bad about yourself - or that you are a failure or have let yourself or your family down -   Trouble concentrating on things such as school, work, reading, or watching TV -   Moving or speaking so slowly that other people could have noticed; or the opposite being so fidgety that others notice -   Thoughts of being better off dead, or hurting yourself in some way -   PHQ 9 Score -   How difficult have these problems made it for you to do your work, take care of your home and get along with others -

## 2018-08-10 NOTE — PROGRESS NOTES
Referral  source Worker[employer] for low back pain and neck pain. HPI:  Verito Murray is a 50 y.o. female here for f/u visit for ongoing evaluation of low back pain and neck pain. Pt was last seen here on 7/10/18. Pt denies interval changes on the character or distribution of pain. Pain is located widespread primarily right side from shoulder to foot, bilateral shoulders and neck, across lumbosacral beltline. The pain is described as aching, numbness, pins and needles,burning, throbbing, stabbing. Pain at its best is 9/10. Pain at its worse is 10/10. The pain is worsened by having opioid medications, increased movement. Symptoms are relieved by opioid medications. Patient given information on other pain management clinics in the area. Pt using TENS unit twice a day, patient following Dr Isatu Figueredo.      Social History     Social History    Marital status:      Spouse name: N/A    Number of children: N/A    Years of education: N/A     Occupational History    nurse assistant      Social History Main Topics    Smoking status: Former Smoker     Types: Cigarettes    Smokeless tobacco: Never Used      Comment: Smoke for 26 years    Alcohol use No    Drug use: No    Sexual activity: Yes     Partners: Male     Birth control/ protection: None     Other Topics Concern    Not on file     Social History Narrative     Family History   Problem Relation Age of Onset    Heart Attack Mother     Heart Disease Mother     Diabetes Mother     Pacemaker Mother     Stroke Father     Cancer Father     Hypertension Maternal Grandmother     Heart Attack Maternal Grandmother     Diabetes Maternal Grandmother     Cancer Maternal Grandmother     Hypertension Maternal Grandfather     Diabetes Maternal Grandfather     Hypertension Paternal Grandmother     Diabetes Paternal Grandmother     Breast Cancer Paternal Grandmother     Hypertension Paternal Grandfather     Diabetes Paternal Grandfather     Heart Attack Sister     Breast Cancer Maternal Aunt     Cancer Maternal Aunt     Breast Cancer Paternal Aunt     Cancer Maternal Uncle     Cancer Niece      Allergies   Allergen Reactions    Latex Rash    Lisinopril Cough     Ace Inhibitor - cough     Past Medical History:   Diagnosis Date    Abnormal nuclear cardiac imaging test 07/10/2012    Mod mid to distal anterior septal ischemia. EF 47%. Mid to distal anterior septal hypk. Normal EKG portion of stress test.  Intermediate high risk.  Asthma     Back pain     injury at work June 2014    Coronary atherosclerosis of native coronary artery     angiographically normal coronaries with dLAD bridging (july 2012)    Diabetes mellitus type II, controlled ([address]     Forgetfulness     since injury June 2014    Fracture of left humerus     GERD (gastroesophageal reflux disease)     Gout     Headache(784.0)     Hypertension     Lack of coordination     since injury June 2014    Neck pain     injury at work June 2014    Obesity     S/P cardiac cath 07/10/2012    dLAD w/mild to mod bridging. Otherwise patent coronaries. At least mod LVH. EF 65%. Past Surgical History:   Procedure Laterality Date    HX APPENDECTOMY      HX GI      Gallbladder    HX HEART CATHETERIZATION  7/10/2012    HX ORTHOPAEDIC      left arm     Current Outpatient Prescriptions on File Prior to Visit   Medication Sig    nortriptyline (PAMELOR) 50 mg capsule Take 1 Cap by mouth nightly.  TENS Units (TENS 504) gee Please provide patient with a TENS unit to use on her lumbosacral region and her cervicothoracic region for chronic pain issues. Indications: For chronic lumbar and cervical pain. Indications: Chronic Pain    tens unit electrodes (TENS UNITS ELECTRODES) 2X2 \" pads For chronic lumbar and cervical pain. Indications: Chronic Pain    metFORMIN (GLUCOPHAGE) 1,000 mg tablet Take 1 Tab by mouth two (2) times daily (with meals).     carvedilol (COREG) 25 mg tablet TAKE ONE TABLET BY MOUTH TWICE DAILY    cloNIDine HCl (CATAPRES) 0.1 mg tablet TAKE ONE TABLET BY MOUTH TWICE DAILY    spironolactone (ALDACTONE) 25 mg tablet TAKE TWO TABLETS BY MOUTH ONCE DAILY (Patient taking differently: 25 mg. TAKE TWO TABLETS BY MOUTH ONCE DAILY)    allopurinol (ZYLOPRIM) 100 mg tablet Take 1 Tab by mouth two (2) times a day.  lovastatin (MEVACOR) 20 mg tablet Take 1 Tab by mouth nightly.  oxybutynin (DITROPAN) 5 mg tablet Take 1 Tab by mouth two (2) times a day.  glucose blood VI test strips (RELION PRIME TEST STRIPS) strip Use as directed    albuterol (PROVENTIL HFA, VENTOLIN HFA, PROAIR HFA) 90 mcg/actuation inhaler Take 2 Puffs by inhalation every four (4) hours as needed for Wheezing.  OTHER Motorized Wheelchair    multivitamin (ONE A DAY) tablet Take 1 tablet by mouth daily.  naloxone 4 mg/actuation spry 4 mg by Nasal route as needed for up to 2 doses. Indications: OPIOID TOXICITY     No current facility-administered medications on file prior to visit. ROS  Denies fever, chills, nausea, vomiting, constipation, chest pain, shortness breath, abdominal pain, weakness, trouble swallowing, changes in visit, changes in hearing, dizziness, depression, anxiety, thoughts to harm yourself, thoughts to harm others, alcohol use. Positive findings include diarrhea, falls, bladder accidents, bowel accidents, headaches    Opioid specific risk: GERD, obesity. Vitals:    08/10/18 1437   BP: 118/84   Pulse: 86   Resp: 16   Temp: 97 °F (36.1 °C)   TempSrc: Oral   Weight: 98.9 kg (218 lb)   Height: 5' 11\" (1.803 m)   PainSc:   8   PainLoc: Back        Imaging: Awaiting patient to obtain      PE:  Physical Exam    AFVSS, no acute distress, overweight body habitus. A&OXs 3. Normocephalic, atraumatic. Conjugate gaze, clear sclerae. Speech is slurred. Mild left-sided facial droop. Heart rate regular rate and rhythm, S1 and S2 noted, no murmur noted.   Lungs clear to auscultation bilaterally, nonlabored breathing, no acute distress. Entire spine tender to palpation. Bilateral SIJ, bilateral GT tender to palpation. Gait and balance with assist of cane in right hand. Exam limited due to patient's ability to get onto exam table due to increased pain    Calculated MEQ -83.5  Naloxone rescue - Yes  Prophylactic bowel program - no  Date of last OCA 12/21/17  Last UDS 7/10/18, awaiting results   date checked today, findings consistent    Primary Care Physician  PROVIDER UNKNOWN  Patient not available to ask  None    Today   Last Visit  ORT -    FLORENTINO -  64%  70%  PGIC -not answered in 8    COMM -12  8    PHQ -- . PHQ over the last two weeks 8/10/2018   Little interest or pleasure in doing things Not at all   Feeling down, depressed, irritable, or hopeless Not at all   Total Score PHQ 2 0   Trouble falling or staying asleep, or sleeping too much -   Feeling tired or having little energy -   Poor appetite, weight loss, or overeating -   Feeling bad about yourself - or that you are a failure or have let yourself or your family down -   Trouble concentrating on things such as school, work, reading, or watching TV -   Moving or speaking so slowly that other people could have noticed; or the opposite being so fidgety that others notice -   Thoughts of being better off dead, or hurting yourself in some way -   PHQ 9 Score -   How difficult have these problems made it for you to do your work, take care of your home and get along with others -       DSM V-OUD Screen -deferred     Assessment/Plan:     ICD-10-CM ICD-9-CM    1. Chronic back pain, unspecified back location, unspecified back pain laterality M54.9 724.5     G89.29 338.29    2. Encounter for long-term (current) use of high-risk medication Z79.899 V58.69    3. Other cervical disc degeneration, unspecified cervical region M50.30 722.4    4. Neck pain M54.2 723.1    5.  Chronic pain syndrome G89.4 338.4 HYDROmorphone (DILAUDID) 2 mg tablet      oxyCODONE IR (OXY-IR) 15 mg immediate release tablet      Reprinted x-rays from May 1 for patient to have completed     Patient previously given piriformis stretching to be done at home, patient to continue exercises    Do not recommend long-term opioid therapy with this patient. Pt taking Hydromorphone extended release 4 mg tab one tablet for pain daily. This visit plan to decrease hydromorphone extended release to 2 mg tablet 1 tablet daily for pain and oxycodone IR 15 mg tablet take 1 tablet by mouth up to 3 times a day as needed for pain. New MME 75.5 plan to decrease opioids by approximately 10% monthly with an end goal of 0 opioids. With regards to patient safety tolerance. Patient to call if she experiences any withdrawal symptoms. Next prescription will discontinue hydromorphone and continue oxycodone IR 15 mg tablet take 1 tablet 3 times a day as needed for pain. With regards to patient safety intolerance. Following visit will decrease oxycodone IR 10 mg tablets take 1 tablet up to 3 times a day as needed for pain. New MME 39. if patient has difficulty with weaning will be referred to behavioral health for evaluation of opioid use disorder. Patient to continue to use TENS unit as needed for pain    Patient to take over-the-counter Tylenol 500 mg 1-2 tablets by mouth up to 3 times a day as needed for pain. Max dose 3000 mg daily. Patient has declined acunupture treatment at this time    Patient offered chiropractor and has declined at this time     Consider need for ongoing speech therapy treatment for dysarthria without dysphasia and subjective reports of dysphagia. Follow up ongoing assessment and ongoing development of integrative and comprehensive plan of care for chronic pain.     GOALS:  To establish complementary and integrative plan of care to address chronic pain issues while minimizing pharmaceuticals to maximize patient's function improve quality of life.    Education:  Patient again educated on the importance of strict compliance with the opioid care agreement while on opioid therapy. Patient also again educated that they should avoid driving while on chronic opioid therapy. Also advised to avoid alcohol and to avoid benzodiazepines while on opioid therapy. Handouts given for sleep health and opioid safety. F/u:. Follow-up Disposition:  Return in about 12 weeks (around 11/2/2018).

## 2018-08-10 NOTE — PATIENT INSTRUCTIONS
Learning About Sleeping Well  What does sleeping well mean? Sleeping well means getting enough sleep. How much sleep is enough varies among people. The number of hours you sleep is not as important as how you feel when you wake up. If you do not feel refreshed, you probably need more sleep. Another sign of not getting enough sleep is feeling tired during the day. The average total nightly sleep time is 7½ to 8 hours. Healthy adults may need a little more or a little less than this. Why is getting enough sleep important? Getting enough quality sleep is a basic part of good health. When your sleep suffers, your mood and your thoughts can suffer too. You may find yourself feeling more grumpy or stressed. Not getting enough sleep also can lead to serious problems, including injury, accidents, anxiety, and depression. What might cause poor sleeping? Many things can cause sleep problems, including:  · Stress. Stress can be caused by fear about a single event, such as giving a speech. Or you may have ongoing stress, such as worry about work or school. · Depression, anxiety, and other mental or emotional conditions. · Changes in your sleep habits or surroundings. This includes changes that happen where you sleep, such as noise, light, or sleeping in a different bed. It also includes changes in your sleep pattern, such as having jet lag or working a late shift. · Health problems, such as pain, breathing problems, and restless legs syndrome. · Lack of regular exercise. How can you help yourself? Here are some tips that may help you sleep more soundly and wake up feeling more refreshed. Your sleeping area  · Use your bedroom only for sleeping and sex. A bit of light reading may help you fall asleep. But if it doesn't, do your reading elsewhere in the house. Don't watch TV in bed. · Be sure your bed is big enough to stretch out comfortably, especially if you have a sleep partner.   · Keep your bedroom quiet, dark, and cool. Use curtains, blinds, or a sleep mask to block out light. To block out noise, use earplugs, soothing music, or a \"white noise\" machine. Your evening and bedtime routine  · Create a relaxing bedtime routine. You might want to take a warm shower or bath, listen to soothing music, or drink a cup of noncaffeinated tea. · Go to bed at the same time every night. And get up at the same time every morning, even if you feel tired. What to avoid  · Limit caffeine (coffee, tea, caffeinated sodas) during the day, and don't have any for at least 4 to 6 hours before bedtime. · Don't drink alcohol before bedtime. Alcohol can cause you to wake up more often during the night. · Don't smoke or use tobacco, especially in the evening. Nicotine can keep you awake. · Don't take naps during the day, especially close to bedtime. · Don't lie in bed awake for too long. If you can't fall asleep, or if you wake up in the middle of the night and can't get back to sleep within 15 minutes or so, get out of bed and go to another room until you feel sleepy. · Don't take medicine right before bed that may keep you awake or make you feel hyper or energized. Your doctor can tell you if your medicine may do this and if you can take it earlier in the day. If you can't sleep  · Imagine yourself in a peaceful, pleasant scene. Focus on the details and feelings of being in a place that is relaxing. · Get up and do a quiet or boring activity until you feel sleepy. · Don't drink any liquids after 6 p.m. if you wake up often because you have to go to the bathroom. Where can you learn more? Go to http://uriel-percy.info/. Enter W131 in the search box to learn more about \"Learning About Sleeping Well. \"  Current as of: December 7, 2017  Content Version: 11.7  © 2578-8420 [employer].  Care instructions adapted under license by [employer] (which disclaims liability or warranty for this information). If you have questions about a medical condition or this instruction, always ask your healthcare professional. [address] any warranty or liability for your use of this information. Safe Use of Opioid Pain Medicine: Care Instructions  Your Care Instructions  Pain is your body's way of warning you that something is wrong. Pain feels different for everybody. Only you can describe your pain. A doctor can suggest or prescribe many types of medicines for pain. These range from over-the-counter medicines like acetaminophen (Tylenol) to powerful medicines called opioids. Examples of opioids are fentanyl, hydrocodone, morphine, and oxycodone. Heroin is an illegal opioid  Opioids are strong medicines. They can help you manage pain when you use them the right way. But if you misuse them, they can cause serious harm and even death. For these reasons, doctors are very careful about how they prescribe opioids. If you decide to take opioids, here are some things to remember. · Keep your doctor informed. You can get addicted to opioids. The risk is higher if you have a history of substance use. Your doctor will monitor you closely for signs of misuse and addiction and to figure out when you no longer need to take opioids. · Make a treatment plan. The goal of your plan is to be able to function and do the things you need to do, even if you still have some pain. You might be able to manage your pain with other non-opioid options like physical therapy, relaxation, or over-the-counter pain medicines. · Be aware of the side effects. Opioids can cause serious side effects, such as constipation, dry mouth, and nausea. And over time, you may need a higher dose to get pain relief. This is called tolerance. Your body also gets used to opioids. This is called physical dependence. If you suddenly stop taking them, you may have withdrawal symptoms.   The doctor carefully considered what pain medicine is right for you. You may not have received opioids if your doctor was concerned about drug interactions or your safety, or if he or she had other concerns. It is best to have one doctor or clinic treat your pain. This way you will get the pain medicine that will help you the most. And a doctor will be able to watch for any problems that the medicine might cause. The doctor has checked you carefully, but problems can develop later. If you notice any problems or new symptoms,  get medical treatment right away. Follow-up care is a key part of your treatment and safety. Be sure to make and go to all appointments, and call your doctor if you are having problems. It's also a good idea to know your test results and keep a list of the medicines you take. How can you care for yourself at home? · If you need to take opioids to manage your pain, remember these safety tips. ¨ Follow directions carefully. It's easy to misuse opioids if you take a dose other than what's prescribed by your doctor. This can lead to overdose and even death. Even sharing them with someone they weren't meant for is misuse. ¨ Be cautious. Opioids may affect your judgment and decision making. Do not drive or operate machinery until you can think clearly. Talk with your doctor about when it is safe to drive. ¨ Reduce the risk of drug interactions. Opioids can be dangerous if you take them with alcohol or with certain drugs like sleeping pills and muscle relaxers. Make sure your doctor knows about all the other medicines you take, including over-the-counter medicines. Don't start any new medicines before you talk to your doctor or pharmacist.  [facility] Keep others safe. Store opioids in a safe and secure place. Make sure that pets, children, friends, and family can't get to them. When you're done using opioids, make sure to properly dispose of them.  You can either use a community drug take-back program or your drugstore's mail-back program. If one of these programs isn't available, you can flush opioid skin patches and unused opioid pills down the toilet. ¨ Reduce the risk of overdose. Misuse of opioids can be very dangerous. Protect yourself by asking your doctor about a naloxone rescue kit. It can help you-and even save your life-if you take too much of an opioid. · Try other ways to reduce pain. ¨ Relax, and reduce stress. Relaxation techniques such as deep breathing or meditation can help. ¨ Keep moving. Gentle, daily exercise can help reduce pain over the long run. Try low- or no-impact exercises such as walking, swimming, and stationary biking. Do stretches to stay flexible. ¨ Try heat, cold packs, and massage. ¨ Get enough sleep. Pain can make you tired and drain your energy. Talk with your doctor if you have trouble sleeping because of pain. ¨ Think positive. Your thoughts can affect your pain level. Do things that you enjoy to distract yourself when you have pain instead of focusing on the pain. See a movie, read a book, listen to music, or spend time with a friend. · If you are not taking a prescription pain medicine, ask your doctor if you can take an over-the-counter medicine. When should you call for help? Call your doctor now or seek immediate medical care if:    · You have a new kind of pain.     · You have new symptoms, such as a fever or rash, along with the pain.    Watch closely for changes in your health, and be sure to contact your doctor if:    · You think you might be using too much pain medicine, and you need help to use less or stop.     · Your pain gets worse.     · You would like a referral to a doctor or clinic that specializes in pain management. Where can you learn more? Go to http://uriel-percy.info/. Enter R108 in the search box to learn more about \"Safe Use of Opioid Pain Medicine: Care Instructions. \"  Current as of: September 10, 2017  Content Version: 11.7  © 0555-8021 [employer]. Care instructions adapted under license by [employer] (which disclaims liability or warranty for this information). If you have questions about a medical condition or this instruction, always ask your healthcare professional. [address] any warranty or liability for your use of this information.

## 2018-08-30 ENCOUNTER — HOSPITAL ENCOUNTER (OUTPATIENT)
Dept: MAMMOGRAPHY | Age: 53
Discharge: HOME | End: 2018-08-30
Attending: NURSE PRACTITIONER
Payer: MEDICARE

## 2018-08-30 DIAGNOSIS — Z12.31 VISIT FOR SCREENING MAMMOGRAM: ICD-10-CM

## 2018-08-30 PROCEDURE — 77067 SCR MAMMO BI INCL CAD: CPT

## 2018-09-04 DIAGNOSIS — G89.4 CHRONIC PAIN SYNDROME: ICD-10-CM

## 2018-09-04 NOTE — TELEPHONE ENCOUNTER
Rima Abdul has called requesting a refill of their controlled medication, hydrocodone and oxycodone, for the management of chronic back pain. Last office visit date: 8/10/18 with Merritt Martínez, next appointment with Merritt Martínez on 11/1/18     Date last  was pulled and reviewed : 9/4/18 last filled on 8/13/18    Was the patient compliant when the above report was pulled? yes    Analgesia: 30%    Aberrancies: none    ADL's: needs help    Adverse Reaction: none    Provider's last note and plan of care reviewed? yes  Request forwarded to provider for review.

## 2018-09-05 RX ORDER — HYDROMORPHONE HYDROCHLORIDE 2 MG/1
2 TABLET ORAL DAILY
Qty: 30 TAB | Refills: 0 | OUTPATIENT
Start: 2018-09-05 | End: 2018-10-05

## 2018-09-05 RX ORDER — OXYCODONE HYDROCHLORIDE 15 MG/1
15 TABLET ORAL
Qty: 90 TAB | Refills: 0 | Status: SHIPPED | OUTPATIENT
Start: 2018-09-05 | End: 2018-10-05

## 2018-10-03 DIAGNOSIS — G89.4 CHRONIC PAIN SYNDROME: Primary | ICD-10-CM

## 2018-10-03 NOTE — TELEPHONE ENCOUNTER
Cheryle Pepper has called requesting a refill of their controlled medication, oxycodone, for the management of her chronic back pain. Last office visit date: 08/10/18    Date last  was pulled and reviewed : 10/03/18, last fill date was 09/10/18    Was the patient compliant when the above report was pulled? yes    Analgesia: pt reports a 25% pain relief with her current opioid medication regimen    Aberrancies: none noted     ADL's: pt reports being able to perform her normal daily duties while taking her medication     Adverse Reaction: none reported by the pt at this time. Provider's last note and plan of care reviewed? yes  Request forwarded to provider for review.

## 2018-10-04 RX ORDER — OXYCODONE HYDROCHLORIDE 10 MG/1
10 TABLET ORAL
Qty: 90 TAB | Refills: 0 | Status: SHIPPED | OUTPATIENT
Start: 2018-10-09 | End: 2018-11-08

## 2018-10-04 NOTE — TELEPHONE ENCOUNTER
Attempted to contact pt about her prescription request. She was not able to be reached. A message was not able to be left for the pt. No pt identity on voicemail.

## 2018-10-04 NOTE — TELEPHONE ENCOUNTER
Pt called the office back and was notified by a PSR that her prescription was done and ready for . She verbalized understanding.

## 2018-11-01 ENCOUNTER — OFFICE VISIT (OUTPATIENT)
Dept: PAIN MANAGEMENT | Age: 53
End: 2018-11-01

## 2018-11-01 VITALS
SYSTOLIC BLOOD PRESSURE: 140 MMHG | RESPIRATION RATE: 14 BRPM | HEIGHT: 71 IN | DIASTOLIC BLOOD PRESSURE: 95 MMHG | WEIGHT: 210 LBS | HEART RATE: 113 BPM | BODY MASS INDEX: 29.4 KG/M2 | TEMPERATURE: 97 F

## 2018-11-01 DIAGNOSIS — M54.9 CHRONIC BACK PAIN, UNSPECIFIED BACK LOCATION, UNSPECIFIED BACK PAIN LATERALITY: Primary | ICD-10-CM

## 2018-11-01 DIAGNOSIS — Z79.899 ENCOUNTER FOR LONG-TERM (CURRENT) USE OF HIGH-RISK MEDICATION: ICD-10-CM

## 2018-11-01 DIAGNOSIS — G89.4 CHRONIC PAIN SYNDROME: ICD-10-CM

## 2018-11-01 DIAGNOSIS — M54.2 NECK PAIN: ICD-10-CM

## 2018-11-01 DIAGNOSIS — M50.30 OTHER CERVICAL DISC DEGENERATION, UNSPECIFIED CERVICAL REGION: ICD-10-CM

## 2018-11-01 DIAGNOSIS — G89.29 CHRONIC BACK PAIN, UNSPECIFIED BACK LOCATION, UNSPECIFIED BACK PAIN LATERALITY: Primary | ICD-10-CM

## 2018-11-01 RX ORDER — OXYCODONE HYDROCHLORIDE 10 MG/1
10 TABLET ORAL
Qty: 90 TAB | Refills: 0 | Status: SHIPPED | OUTPATIENT
Start: 2018-11-05 | End: 2018-12-03 | Stop reason: SDUPTHER

## 2018-11-01 RX ORDER — NAPROXEN 375 MG/1
375 TABLET, DELAYED RELEASE ORAL
Qty: 60 TAB | Refills: 1 | Status: SHIPPED | OUTPATIENT
Start: 2018-11-01 | End: 2019-07-18 | Stop reason: SDUPTHER

## 2018-11-01 NOTE — PROGRESS NOTES
Nursing Notes    Patient presents to the office today in follow-up. Patient rates her pain at 9/10 on the numerical pain scale. Reviewed medications with counts as follows:    Rx Date filled Qty Dispensed Pill Count Last Dose Short   Oxycodone 10 mg 10/8/18 90 15 today no                           provider aware of B/P 139/98 140/95  Last opioid agreement 12/6/17  Last urine drug screen  7/10/18  PHQ over the last two weeks 11/1/2018   Little interest or pleasure in doing things Not at all   Feeling down, depressed, irritable, or hopeless Not at all   Total Score PHQ 2 0   Trouble falling or staying asleep, or sleeping too much -   Feeling tired or having little energy -   Poor appetite, weight loss, or overeating -   Feeling bad about yourself - or that you are a failure or have let yourself or your family down -   Trouble concentrating on things such as school, work, reading, or watching TV -   Moving or speaking so slowly that other people could have noticed; or the opposite being so fidgety that others notice -   Thoughts of being better off dead, or hurting yourself in some way -   PHQ 9 Score -   How difficult have these problems made it for you to do your work, take care of your home and get along with others -       Comments:     POC UDS was not performed in office today    Any new labs or imaging since last appointment? NO    Have you been to an emergency room (ER) or urgent care clinic since your last visit? NO            Have you been hospitalized since your last visit? NO     If yes, where, when, and reason for visit? Have you seen or consulted any other health care providers outside of the [address] [place] [address]  since your last visit? NO     If yes, where, when, and reason for visit? Ms. Goyo Holcomb has a reminder for a \"due or due soon\" health maintenance. I have asked that she contact her primary care provider for follow-up on this health maintenance.

## 2018-11-01 NOTE — PROGRESS NOTES
Referral  source Worker's Compensation for low back pain and neck pain. HPI:  Yudi Osman is a 48 y.o. female here for f/u visit for ongoing evaluation of low back pain and neck pain. Pt was last seen here on 08/10/18. Pt denies interval changes on the character or distribution of pain. Pain is located widespread primarily right side from shoulder to foot, bilateral shoulders and neck, across lumbosacral beltline. The pain is described as aching, numbness, pins and needles,burning, throbbing, stabbing. Pain at its best is 9/10. Pain at its worse is 10/10. The pain is worsened by having opioid medications, increased movement. Symptoms are relieved by opioid medications. Patient given information on other pain management clinics in the area. Pt using TENS unit twice a day, patient following Dr Lanie Reina. Patient's haven't been able to obtain BP medications at PCP due to them being closed down per patient, she has been twice, patient informed of other [facility] family practice in the area.   Patient would like referral for home health aide and another pain management facility, patient was given referrals for both  Social History     Socioeconomic History    Marital status:      Spouse name: Not on file    Number of children: Not on file    Years of education: Not on file    Highest education level: Not on file   Social Needs    Financial resource strain: Not on file    Food insecurity - worry: Not on file    Food insecurity - inability: Not on file    Transportation needs - medical: Not on file   [employer] needs - non-medical: Not on file   Occupational History    Occupation: nurse assistant   Tobacco Use    Smoking status: Former Smoker     Types: Cigarettes    Smokeless tobacco: Never Used    Tobacco comment: Smoke for 26 years   Substance and Sexual Activity    Alcohol use: No    Drug use: No    Sexual activity: Yes     Partners: Male     Birth control/protection: None   Other Topics Concern    Not on file   Social History Narrative    Not on file     Family History   Problem Relation Age of Onset    Heart Attack Mother     Heart Disease Mother     Diabetes Mother     Pacemaker Mother     Stroke Father     Cancer Father     Hypertension Maternal Grandmother     Heart Attack Maternal Grandmother     Diabetes Maternal Grandmother     Cancer Maternal Grandmother     Hypertension Maternal Grandfather     Diabetes Maternal Grandfather     Hypertension Paternal Grandmother     Diabetes Paternal Grandmother     Breast Cancer Paternal Grandmother     Hypertension Paternal Grandfather     Diabetes Paternal Grandfather     Heart Attack Sister     Breast Cancer Maternal Aunt     Cancer Maternal Aunt     Breast Cancer Paternal Aunt     Cancer Maternal Uncle     Cancer Niece      Allergies   Allergen Reactions    Latex Rash    Lisinopril Cough     Ace Inhibitor - cough     Past Medical History:   Diagnosis Date    Abnormal nuclear cardiac imaging test 07/10/2012    Mod mid to distal anterior septal ischemia. EF 47%. Mid to distal anterior septal hypk. Normal EKG portion of stress test.  Intermediate high risk.  Asthma     Back pain     injury at work June 2014    Coronary atherosclerosis of native coronary artery     angiographically normal coronaries with dLAD bridging (july 2012)    Diabetes mellitus type II, controlled ([facility] [address]     Forgetfulness     since injury June 2014    Fracture of left humerus     GERD (gastroesophageal reflux disease)     Gout     Headache(784.0)     Hypertension     Lack of coordination     since injury June 2014    Neck pain     injury at work June 2014    Obesity     S/P cardiac cath 07/10/2012    dLAD w/mild to mod bridging. Otherwise patent coronaries. At least mod LVH. EF 65%.        Past Surgical History:   Procedure Laterality Date    HX APPENDECTOMY      HX GI      Gallbladder    HX HEART CATHETERIZATION  7/10/2012    HX ORTHOPAEDIC      left arm     Current Outpatient Medications on File Prior to Visit   Medication Sig    carvedilol (COREG) 25 mg tablet TAKE ONE TABLET BY MOUTH TWICE DAILY.  cloNIDine HCl (CATAPRES) 0.1 mg tablet TAKE ONE TABLET BY MOUTH TWICE DAILY    nortriptyline (PAMELOR) 50 mg capsule Take 1 Cap by mouth nightly. (Patient taking differently: Take 50 mg by mouth ACB/HS. )    TENS Units (TENS 504) gee Please provide patient with a TENS unit to use on her lumbosacral region and her cervicothoracic region for chronic pain issues. Indications: For chronic lumbar and cervical pain. Indications: Chronic Pain    tens unit electrodes (TENS UNITS ELECTRODES) 2X2 \" pads For chronic lumbar and cervical pain. Indications: Chronic Pain    metFORMIN (GLUCOPHAGE) 1,000 mg tablet Take 1 Tab by mouth two (2) times daily (with meals).  allopurinol (ZYLOPRIM) 100 mg tablet Take 1 Tab by mouth two (2) times a day.  lovastatin (MEVACOR) 20 mg tablet Take 1 Tab by mouth nightly.  naloxone 4 mg/actuation spry 4 mg by Nasal route as needed for up to 2 doses. Indications: OPIOID TOXICITY    oxybutynin (DITROPAN) 5 mg tablet Take 1 Tab by mouth two (2) times a day.  glucose blood VI test strips (RELION PRIME TEST STRIPS) strip Use as directed    albuterol (PROVENTIL HFA, VENTOLIN HFA, PROAIR HFA) 90 mcg/actuation inhaler Take 2 Puffs by inhalation every four (4) hours as needed for Wheezing.  OTHER Motorized Wheelchair    multivitamin (ONE A DAY) tablet Take 1 tablet by mouth daily.  oxyCODONE IR (ROXICODONE) 10 mg tab immediate release tablet Take 1 Tab by mouth every eight (8) hours as needed for up to 30 days. Max Daily Amount: 30 mg.    spironolactone (ALDACTONE) 25 mg tablet TAKE TWO TABLETS BY MOUTH ONCE DAILY (Patient taking differently: 25 mg. TAKE TWO TABLETS BY MOUTH ONCE DAILY)     No current facility-administered medications on file prior to visit.          ROS  Denies fever, chills, nausea, vomiting, constipation, chest pain, shortness breath, abdominal pain, weakness, trouble swallowing, changes in visit, changes in hearing, dizziness, depression, anxiety, thoughts to harm yourself, thoughts to harm others, alcohol use. Positive findings include diarrhea, falls, bladder accidents, bowel accidents, headaches    Opioid specific risk: GERD, obesity, long-term opioid use. Vitals:    11/01/18 1518 11/01/18 1528   BP: (!) 139/98 (!) 140/95   Pulse: (!) 113    Resp: 14    Temp: 97 °F (36.1 °C)    TempSrc: Oral    Weight: 95.3 kg (210 lb)    Height: 5' 11\" (1.803 m)    PainSc:   9    PainLoc: Back    LMP: 08/01/2018        Imaging: Ordered 5/1/18 has not been obtained yet    PE:  Physical Exam  AFVS elevated BP and hr elevated, no acute distress, normal body habitus. A&OXs 3. Normocephalic, atraumatic. Conjugate gaze, clear sclerae. Speech is clear and appropriate. Mood is appropriate and patient is cooperative. Antalgic gait with decreased frances with assistance of left-handed mono cane   non-labored breathing. Lungs CTA B/L. No wheezing, rales or rhonchi. Heart RRR with S1 and S2 noted. No murmurs. Entire spine, bilateral SIJ, left GT tender to light palpation   Exam limited by patient's ability to climb onto exam table due to increased pain. Calculated MEQ -45  Naloxone rescue - Yes  Prophylactic bowel program - no  Date of last OCA 12/21/17  Last UDS 7/10/18, awaiting results   date checked today, findings consistent    Primary Care Physician  Unknown, Provider  Patient not available to ask  None    Today   Last Visit  prior visit  ORT -    PGIC -1 and 9   not answered in 8  FLORENTINO -82%   64%  70%  COMM -7  12  8    PHQ -- .   PHQ over the last two weeks 11/1/2018   Little interest or pleasure in doing things Not at all   Feeling down, depressed, irritable, or hopeless Not at all   Total Score PHQ 2 0   Trouble falling or staying asleep, or sleeping too much -   Feeling tired or having little energy -   Poor appetite, weight loss, or overeating -   Feeling bad about yourself - or that you are a failure or have let yourself or your family down -   Trouble concentrating on things such as school, work, reading, or watching TV -   Moving or speaking so slowly that other people could have noticed; or the opposite being so fidgety that others notice -   Thoughts of being better off dead, or hurting yourself in some way -   PHQ 9 Score -   How difficult have these problems made it for you to do your work, take care of your home and get along with others -       DSM V-OUD Screen -deferred     Assessment/Plan:     ICD-10-CM ICD-9-CM    1. Chronic back pain, unspecified back location, unspecified back pain laterality M54.9 724.5 naproxen EC (EC-NAPROSYN) 375 mg TbEC    G89.29 338.29 REFERRAL TO PAIN MANAGEMENT      oxyCODONE IR (ROXICODONE) 10 mg tab immediate release tablet      REFERRAL TO HOME HEALTH   2. Chronic pain syndrome G89.4 338.4 naproxen EC (EC-NAPROSYN) 375 mg TbEC      REFERRAL TO PAIN MANAGEMENT      oxyCODONE IR (ROXICODONE) 10 mg tab immediate release tablet      REFERRAL TO HOME HEALTH   3. Encounter for long-term (current) use of high-risk medication Z79.899 V58.69    4. Other cervical disc degeneration, unspecified cervical region M50.30 722.4 REFERRAL TO PAIN MANAGEMENT      oxyCODONE IR (ROXICODONE) 10 mg tab immediate release tablet   5. Neck pain M54.2 723.1 naproxen EC (EC-NAPROSYN) 375 mg TbEC      REFERRAL TO PAIN MANAGEMENT      oxyCODONE IR (ROXICODONE) 10 mg tab immediate release tablet      REFERRAL TO HOME HEALTH      Referral for pain management per patient request    Referral for home health to assist with ADLs per patient request    Ordered Naproxen 375 mg EC 1 TABLET UP TO BID PRN pain, to be taken with food    X-rays from May 1 not completed, asked patient to obtain x-rays that there is still on the system.     Patient previously given piriformis stretching to be done at home, patient to continue exercises    Do not recommend long term opioid therapy for this patient at this time. Pt currently taking oxycodone 10 mg 1 tablet up to 3 times daily as needed for pain. Their MME is 39. Today, we will continue the weaning of patients opioid medication with a goal of being opioid free, pending safety and compliance. Pt instructed to call if they experience any signs of withdrawal (diarrhea, nausea, vomiting, sweating or chills, agitation, itching). Today, pt given prescription for oxycodone 10 mg 1 tablet up to 3 times daily as needed for pain. Their new MME is 39. Pt instructed to call 5-7 days before they run out of their medications for refill. At this time pt will be provided with oxycodone 10 mg 1 tablet up to 3 times daily as needed for pain. pending safety and compliance. At following refill, pt will be provided with oxycodone 10 mg tablet 1 tablet up to 3 times daily as needed for pain with a total of 80 tablets of budgeted for 30 days pending safety and compliance. At next office visit, the plan is to provide patient with oxycodone 10 mg tablet 1 tablet up to 3 times daily as needed for pain with a total of 70 tablets of budgeted for 30 days. Their new MME will be approximately 30. If patient has difficulty with the wean or difficulty with cravings we will consider referral to mental health for ongoing assessment and treatment for opioid use disorder. Patient to continue to use Biofreeze and Aspercreme and lidocaine patches      Patient to continue to use TENS unit as needed for pain    Patient to take over-the-counter Tylenol 500 mg 1-2 tablets by mouth up to 3 times a day as needed for pain. Max dose 3000 mg daily.      Patient has declined acunupture treatment at this time    Patient offered chiropractor and has declined at this time     Consider need for ongoing speech therapy treatment for dysarthria without dysphasia and subjective reports of dysphagia, physical therapy, CBT, pain psychology, diagnostic or therapeutic bilateral SIJ injection, diagnostic or therapeutic left GT injection    Follow up ongoing assessment and ongoing development of integrative and comprehensive plan of care for chronic pain. GOALS:  To establish complementary and integrative plan of care to address chronic pain issues while minimizing pharmaceuticals to maximize patient's function improve quality of life. Education:  Patient again educated on the importance of strict compliance with the opioid care agreement while on opioid therapy. Patient also again educated that they should avoid driving while on chronic opioid therapy. Also advised to avoid alcohol and to avoid benzodiazepines while on opioid therapy. Handouts given for sleep health and opioid safety. F/u:. Follow-up Disposition:  Return in about 3 months (around 2/1/2019). [address] was used for portions of this report. Unintended errors may occur.

## 2018-11-01 NOTE — PATIENT INSTRUCTIONS
Learning About Sleeping Well  What does sleeping well mean? Sleeping well means getting enough sleep. How much sleep is enough varies among people. The number of hours you sleep is not as important as how you feel when you wake up. If you do not feel refreshed, you probably need more sleep. Another sign of not getting enough sleep is feeling tired during the day. The average total nightly sleep time is 7½ to 8 hours. Healthy adults may need a little more or a little less than this. Why is getting enough sleep important? Getting enough quality sleep is a basic part of good health. When your sleep suffers, your mood and your thoughts can suffer too. You may find yourself feeling more grumpy or stressed. Not getting enough sleep also can lead to serious problems, including injury, accidents, anxiety, and depression. What might cause poor sleeping? Many things can cause sleep problems, including:  · Stress. Stress can be caused by fear about a single event, such as giving a speech. Or you may have ongoing stress, such as worry about work or school. · Depression, anxiety, and other mental or emotional conditions. · Changes in your sleep habits or surroundings. This includes changes that happen where you sleep, such as noise, light, or sleeping in a different bed. It also includes changes in your sleep pattern, such as having jet lag or working a late shift. · Health problems, such as pain, breathing problems, and restless legs syndrome. · Lack of regular exercise. How can you help yourself? Here are some tips that may help you sleep more soundly and wake up feeling more refreshed. Your sleeping area  · Use your bedroom only for sleeping and sex. A bit of light reading may help you fall asleep. But if it doesn't, do your reading elsewhere in the house. Don't watch TV in bed. · Be sure your bed is big enough to stretch out comfortably, especially if you have a sleep partner.   · Keep your bedroom quiet, dark, and cool. Use curtains, blinds, or a sleep mask to block out light. To block out noise, use earplugs, soothing music, or a \"white noise\" machine. Your evening and bedtime routine  · Create a relaxing bedtime routine. You might want to take a warm shower or bath, listen to soothing music, or drink a cup of noncaffeinated tea. · Go to bed at the same time every night. And get up at the same time every morning, even if you feel tired. What to avoid  · Limit caffeine (coffee, tea, caffeinated sodas) during the day, and don't have any for at least 4 to 6 hours before bedtime. · Don't drink alcohol before bedtime. Alcohol can cause you to wake up more often during the night. · Don't smoke or use tobacco, especially in the evening. Nicotine can keep you awake. · Don't take naps during the day, especially close to bedtime. · Don't lie in bed awake for too long. If you can't fall asleep, or if you wake up in the middle of the night and can't get back to sleep within 15 minutes or so, get out of bed and go to another room until you feel sleepy. · Don't take medicine right before bed that may keep you awake or make you feel hyper or energized. Your doctor can tell you if your medicine may do this and if you can take it earlier in the day. If you can't sleep  · Imagine yourself in a peaceful, pleasant scene. Focus on the details and feelings of being in a place that is relaxing. · Get up and do a quiet or boring activity until you feel sleepy. · Don't drink any liquids after 6 p.m. if you wake up often because you have to go to the bathroom. Where can you learn more? Go to http://uriel-percy.info/. Enter P687 in the search box to learn more about \"Learning About Sleeping Well. \"  Current as of: December 7, 2017  Content Version: 11.8  © 3400-6018 [employer], [employer].  Care instructions adapted under license by [employer] (which disclaims liability or warranty for this information). If you have questions about a medical condition or this instruction, always ask your healthcare professional. [address] any warranty or liability for your use of this information. Safe Use of Opioid Pain Medicine: Care Instructions  Your Care Instructions  Pain is your body's way of warning you that something is wrong. Pain feels different for everybody. Only you can describe your pain. A doctor can suggest or prescribe many types of medicines for pain. These range from over-the-counter medicines like acetaminophen (Tylenol) to powerful medicines called opioids. Examples of opioids are fentanyl, hydrocodone, morphine, and oxycodone. Heroin is an illegal opioid  Opioids are strong medicines. They can help you manage pain when you use them the right way. But if you misuse them, they can cause serious harm and even death. For these reasons, doctors are very careful about how they prescribe opioids. If you decide to take opioids, here are some things to remember. · Keep your doctor informed. You can get addicted to opioids. The risk is higher if you have a history of substance use. Your doctor will monitor you closely for signs of misuse and addiction and to figure out when you no longer need to take opioids. · Make a treatment plan. The goal of your plan is to be able to function and do the things you need to do, even if you still have some pain. You might be able to manage your pain with other non-opioid options like physical therapy, relaxation, or over-the-counter pain medicines. · Be aware of the side effects. Opioids can cause serious side effects, such as constipation, dry mouth, and nausea. And over time, you may need a higher dose to get pain relief. This is called tolerance. Your body also gets used to opioids. This is called physical dependence. If you suddenly stop taking them, you may have withdrawal symptoms.   The doctor carefully considered what pain medicine is right for you. You may not have received opioids if your doctor was concerned about drug interactions or your safety, or if he or she had other concerns. It is best to have one doctor or clinic treat your pain. This way you will get the pain medicine that will help you the most. And a doctor will be able to watch for any problems that the medicine might cause. The doctor has checked you carefully, but problems can develop later. If you notice any problems or new symptoms,  get medical treatment right away. Follow-up care is a key part of your treatment and safety. Be sure to make and go to all appointments, and call your doctor if you are having problems. It's also a good idea to know your test results and keep a list of the medicines you take. How can you care for yourself at home? · If you need to take opioids to manage your pain, remember these safety tips. ? Follow directions carefully. It's easy to misuse opioids if you take a dose other than what's prescribed by your doctor. This can lead to overdose and even death. Even sharing them with someone they weren't meant for is misuse. ? Be cautious. Opioids may affect your judgment and decision making. Do not drive or operate machinery until you can think clearly. Talk with your doctor about when it is safe to drive. ? Reduce the risk of drug interactions. Opioids can be dangerous if you take them with alcohol or with certain drugs like sleeping pills and muscle relaxers. Make sure your doctor knows about all the other medicines you take, including over-the-counter medicines. Don't start any new medicines before you talk to your doctor or pharmacist.  ? Keep others safe. Store opioids in a safe and secure place. Make sure that pets, children, friends, and family can't get to them. When you're done using opioids, make sure to properly dispose of them.  You can either use a community drug take-back program or your drugstore's mail-back program. If one of these programs isn't available, you can flush opioid skin patches and unused opioid pills down the toilet. ? Reduce the risk of overdose. Misuse of opioids can be very dangerous. Protect yourself by asking your doctor about a naloxone rescue kit. It can help you--and even save your life--if you take too much of an opioid. · Try other ways to reduce pain. ? Relax, and reduce stress. Relaxation techniques such as deep breathing or meditation can help. ? Keep moving. Gentle, daily exercise can help reduce pain over the long run. Try low- or no-impact exercises such as walking, swimming, and stationary biking. Do stretches to stay flexible. ? Try heat, cold packs, and massage. ? Get enough sleep. Pain can make you tired and drain your energy. Talk with your doctor if you have trouble sleeping because of pain. ? Think positive. Your thoughts can affect your pain level. Do things that you enjoy to distract yourself when you have pain instead of focusing on the pain. See a movie, read a book, listen to music, or spend time with a friend. · If you are not taking a prescription pain medicine, ask your doctor if you can take an over-the-counter medicine. When should you call for help? Call your doctor now or seek immediate medical care if:    · You have a new kind of pain.     · You have new symptoms, such as a fever or rash, along with the pain.    Watch closely for changes in your health, and be sure to contact your doctor if:    · You think you might be using too much pain medicine, and you need help to use less or stop.     · Your pain gets worse.     · You would like a referral to a doctor or clinic that specializes in pain management. Where can you learn more? Go to http://uriel-percy.info/. Enter R108 in the search box to learn more about \"Safe Use of Opioid Pain Medicine: Care Instructions. \"  Current as of: September 10, 2017  Content Version: 11.8  © 3103-1415 [employer]. Care instructions adapted under license by [employer] (which disclaims liability or warranty for this information). If you have questions about a medical condition or this instruction, always ask your healthcare professional. [address] any warranty or liability for your use of this information.

## 2018-11-02 ENCOUNTER — DOCUMENTATION ONLY (OUTPATIENT)
Dept: FAMILY MEDICINE CLINIC | Age: 53
End: 2018-11-02

## 2018-11-02 NOTE — PROGRESS NOTES
Pt came into the office today thinking she had an appt today. She was yelling and stating someone called her yesterday and told her to come in today at 9:45 to see Diogenes. Stating so see who called her and check on her appt. Then her sister comes up and stated she has an appt someone called her how we know DIOGENES name if she don't have a appt. I told her because she has an appt Monday. I told her I went back there and I talked to the NP and nurse and appt Monday no appt today. No somebody called her last night and you need to have more patience with a patient that has a gordo injury. I need to speak with a supervisor, I need to speak to somebody this don't make no since. She was talking all loud and talking to me all rude. I told her ma'am , you are not going to keep talking to me like that. Then I told her to stop talking to me and walked away and rene took over to assist the patient.

## 2018-12-03 ENCOUNTER — TELEPHONE (OUTPATIENT)
Dept: PAIN MANAGEMENT | Age: 53
End: 2018-12-03

## 2018-12-03 DIAGNOSIS — M50.30 OTHER CERVICAL DISC DEGENERATION, UNSPECIFIED CERVICAL REGION: ICD-10-CM

## 2018-12-03 DIAGNOSIS — M54.2 NECK PAIN: ICD-10-CM

## 2018-12-03 DIAGNOSIS — M54.9 CHRONIC BACK PAIN, UNSPECIFIED BACK LOCATION, UNSPECIFIED BACK PAIN LATERALITY: ICD-10-CM

## 2018-12-03 DIAGNOSIS — G89.29 CHRONIC BACK PAIN, UNSPECIFIED BACK LOCATION, UNSPECIFIED BACK PAIN LATERALITY: ICD-10-CM

## 2018-12-03 DIAGNOSIS — G89.4 CHRONIC PAIN SYNDROME: ICD-10-CM

## 2018-12-03 RX ORDER — OXYCODONE HYDROCHLORIDE 10 MG/1
TABLET ORAL
Qty: 80 TAB | Refills: 0 | Status: SHIPPED | OUTPATIENT
Start: 2018-12-04 | End: 2019-01-21 | Stop reason: DRUGHIGH

## 2018-12-03 NOTE — TELEPHONE ENCOUNTER
Patient called the office today concerning her medication refill request I completed the 4 A 's     Oxycodone     20% pain relief     Yes-- able to perform daily task    No--- side affects     Please give patient a call when meds are ready.

## 2018-12-03 NOTE — TELEPHONE ENCOUNTER
Patient identified using two patient identifiers (name and ); patient advised prescriptions are ready for pick-up. Patient verbalized understanding. The patient wanted to send her  to pick prescription up. I told her that if her  picks up her prescription, I will send a copy of the OCA with him, but it needs to be brought back tomorrow.  Patient verbalized understanding, b/c OCA expires on 18

## 2018-12-03 NOTE — TELEPHONE ENCOUNTER
Viky Goncalves has called requesting a refill of their controlled medication, Roxicodone 10 mg tab, for the management of chronic back pain. Last office visit date: 11/1/18 with Gladis and has a f/u appt on 1/21/19. Last opioid care agreement 12/21/17  Last UDS was done 7/10/18    Date last  was pulled and reviewed : 12/3/18. Last fill date: 11/5/18    Was the patient compliant when the above report was pulled? yes    Analgesia: Per T.S. Patient reports 20% pain relief on current regimen. Aberrancies: None reported in the last 30 days. ADL's: Per T.S. Patient states they are able to perform ADL's on current regimen. Adverse Reaction: Per T.S. Patient reports no adverse reactions at this time. Provider's last note and plan of care reviewed? yes  Request forwarded to provider for review.

## 2018-12-05 NOTE — ED NOTES
I have reviewed discharge instructions with the patient. The patient verbalized understanding. Patient requesting to wait in the room until her pain medication starts to work. Telephone Encounter by Charis Thomas NCMA at 05/18/18 07:39 AM     Author:  Charis Thomas NCMA Service:  (none) Author Type:  Certified Medical Assistant     Filed:  05/18/18 07:39 AM Encounter Date:  5/17/2018 Status:  Signed     :  Charis Thomas NCMA (Certified Medical Assistant)       From: Queta Aquino  To: Truman Alexander MD  Sent: 5/17/2018  6:26 PM CDT  Subject: Medication Questions    Hi Dr. Alexander:  I'm doing pretty well.  The gout attack subsided after a   few days on the Prednisone.  I still have stiffness in my big right toe,   but I think it's osteoarthritis, as shown in my foot x-ray--not more gout.    I was wondering if you could send a new prescription for the .6   Colchicine tablets (30 day supply) to the [facility] at [address] in [place].  Phone number is [phone].  I was able to get a Gold   Good RX card that is only accepted at [facility], but  the 30-day supply of   Colchicine will only cost $52.27.  When I picked up my original   prescription that you sent to [facility] last week, it would have been $200   for 30 days.  I decided to only get 15 at the time while I checked around   to see where else I might be able to get it cheaper.  If at my next   appointment in June you think I will stay on Colchicine for awhile, then   maybe we can extend the prescription to a 90-day supply, and hopefully   that will end up costing less per pill.  Thanks so much.  Have a nice  evening.  Dannielle Aquino       Revision History        Date/Time User Provider Type Action    > 05/18/18 07:39 AM Charis Thomas NCMA Certified Medical Assistant Sign    Attribution information within the note text is not available.

## 2018-12-11 ENCOUNTER — DOCUMENTATION ONLY (OUTPATIENT)
Dept: NEUROLOGY | Age: 53
End: 2018-12-11

## 2018-12-17 ENCOUNTER — OFFICE VISIT (OUTPATIENT)
Dept: NEUROLOGY | Age: 53
End: 2018-12-17

## 2018-12-17 VITALS
SYSTOLIC BLOOD PRESSURE: 140 MMHG | OXYGEN SATURATION: 98 % | HEART RATE: 116 BPM | WEIGHT: 245 LBS | RESPIRATION RATE: 18 BRPM | BODY MASS INDEX: 34.3 KG/M2 | HEIGHT: 71 IN | DIASTOLIC BLOOD PRESSURE: 80 MMHG | TEMPERATURE: 98.3 F

## 2018-12-17 DIAGNOSIS — G44.309 POST-CONCUSSION HEADACHE: ICD-10-CM

## 2018-12-17 DIAGNOSIS — G44.329 CHRONIC POST-TRAUMATIC HEADACHE, NOT INTRACTABLE: ICD-10-CM

## 2018-12-17 DIAGNOSIS — R60.9 EDEMA, UNSPECIFIED TYPE: ICD-10-CM

## 2018-12-17 RX ORDER — CARVEDILOL 25 MG/1
25 TABLET ORAL 2 TIMES DAILY WITH MEALS
Qty: 60 TAB | Refills: 1 | Status: SHIPPED | OUTPATIENT
Start: 2018-12-17

## 2018-12-17 RX ORDER — CLONIDINE HYDROCHLORIDE 0.1 MG/1
TABLET ORAL
Qty: 60 TAB | Refills: 1 | Status: SHIPPED | OUTPATIENT
Start: 2018-12-17

## 2018-12-17 RX ORDER — NORTRIPTYLINE HYDROCHLORIDE 50 MG/1
50 CAPSULE ORAL
Qty: 60 CAP | Refills: 5 | Status: SHIPPED | OUTPATIENT
Start: 2018-12-17 | End: 2019-06-17 | Stop reason: DRUGHIGH

## 2018-12-17 RX ORDER — SPIRONOLACTONE 25 MG/1
25 TABLET ORAL 2 TIMES DAILY
Qty: 60 TAB | Refills: 1 | Status: SHIPPED | OUTPATIENT
Start: 2018-12-17 | End: 2018-12-21 | Stop reason: SDUPTHER

## 2018-12-17 NOTE — PROGRESS NOTES
Chief Complaint   Patient presents with    Neurologic Problem     neuritis of BLE   patient reports has not taken BP meds in about a month.

## 2018-12-17 NOTE — PROGRESS NOTES
Re:  Pooja Rangel,Follow up visit     12/17/2018 1:37 PM      SSN: [id number]    Subjective:   Graciela Anderson returns today. She's been treated by the pain clinic for the last year with only marginal improvement. She still has a lot of pain in the back and down the right leg. He has been falling because of weakness of the right leg. She complains of terrible headaches. She has daily headaches, the medications do not seem to be working at all. She's complaining about lose of her doctors over at pain management. She's complaining about not getting her medications at the pain clinic. She complains about her primary doctor's office closing. She's supposed to be seeing the new doctors at the pain clinic. She's going to try to get a hold of Dr Ana Monaco. Medications:    Current Outpatient Medications   Medication Sig Dispense Refill    oxyCODONE IR (ROXICODONE) 10 mg tab immediate release tablet Take 1 tablet up to 3 times daily as needed for pain with a total of 80 tablets of budgeted for 30 days. 80 Tab 0    naproxen EC (EC-NAPROSYN) 375 mg TbEC Take 1 Tab by mouth two (2) times daily as needed. 60 Tab 1    carvedilol (COREG) 25 mg tablet TAKE ONE TABLET BY MOUTH TWICE DAILY. 60 Tab 0    cloNIDine HCl (CATAPRES) 0.1 mg tablet TAKE ONE TABLET BY MOUTH TWICE DAILY 60 Tab 1    nortriptyline (PAMELOR) 50 mg capsule Take 1 Cap by mouth nightly. (Patient taking differently: Take 50 mg by mouth ACB/HS. ) 60 Cap 6    TENS Units (TENS 504) gee Please provide patient with a TENS unit to use on her lumbosacral region and her cervicothoracic region for chronic pain issues. Indications: For chronic lumbar and cervical pain. Indications: Chronic Pain 1 Device 0    tens unit electrodes (TENS UNITS ELECTRODES) 2X2 \" pads For chronic lumbar and cervical pain. Indications: Chronic Pain 1 Each 0    metFORMIN (GLUCOPHAGE) 1,000 mg tablet Take 1 Tab by mouth two (2) times daily (with meals).  180 Tab 1    spironolactone (ALDACTONE) 25 mg tablet TAKE TWO TABLETS BY MOUTH ONCE DAILY (Patient taking differently: 25 mg. TAKE TWO TABLETS BY MOUTH ONCE DAILY) 180 Tab 1    allopurinol (ZYLOPRIM) 100 mg tablet Take 1 Tab by mouth two (2) times a day. 180 Tab 1    lovastatin (MEVACOR) 20 mg tablet Take 1 Tab by mouth nightly. 30 Tab 5    naloxone 4 mg/actuation spry 4 mg by Nasal route as needed for up to 2 doses. Indications: OPIOID TOXICITY 1 Box 1    oxybutynin (DITROPAN) 5 mg tablet Take 1 Tab by mouth two (2) times a day. 60 Tab 3    glucose blood VI test strips (RELION PRIME TEST STRIPS) strip Use as directed 50 Strip 2    albuterol (PROVENTIL HFA, VENTOLIN HFA, PROAIR HFA) 90 mcg/actuation inhaler Take 2 Puffs by inhalation every four (4) hours as needed for Wheezing. 1 Inhaler 0    OTHER Motorized Wheelchair 1 Device prn    multivitamin (ONE A DAY) tablet Take 1 tablet by mouth daily. Vital signs:    Visit Vitals  /80 (BP 1 Location: Left arm, BP Patient Position: Sitting)   Pulse (!) 116   Temp 98.3 °F (36.8 °C)   Resp 18   Ht 5' 11\" (1.803 m)   Wt 111.1 kg (245 lb)   SpO2 98%   BMI 34.17 kg/m²       Review of Systems: does get some dizziness. Has some pain in the left ear.   As above otherwise 11 point review of systems negative including;   Constitutional no fever or chills  Skin denies rash or itching  HEENT  Denies tinnitus, hearing lose  Eyes denies diplopia vision lose  Respiratory denies sortness of breath  Cardiovascular denies chest pain, dyspnea on exertion  Gastrointestinal denies nausea, vomiting, diarrhea, constipation  Genitourinary denies incontinence  Musculoskeletal denies joint pain or swelling  Endocrine denies weight change  Hematology denies easy bruising or bleeding   Neurological as above in HPI      Patient Active Problem List   Diagnosis Code    Hypertension I10    Diabetes mellitus type II, controlled ([facility] [address] E11.9    Coronary atherosclerosis of native coronary artery(aka CAD) I25.10    Headache R51    GERD (gastroesophageal reflux disease) K21.9    Gout M10.9    Endometriosis N80.9    Abnormal Pap smear [id number]    Contusion T14. 8XXA    Post-concussion headache G44.309    Chronic pain G89.29    Back pain M54.9    Multiple contusions T07. Gallegos Brochure    Fall W19. Gallegos Brochure    Chest pain R07.9    Neck pain M54.2    Disturbance of skin sensation R20.9    Carpal tunnel syndrome G56.00    Bilateral carpal tunnel syndrome G56.03    Cervicalgia M54.2    Brachial neuritis M54.12    Degeneration of cervical intervertebral disc M50.30    Degeneration of lumbar or lumbosacral intervertebral disc M51.37    Encounter for long-term opiate analgesic use Z79.891    Chronic pain syndrome G89.4    Neuritis of lower extremity G57.90    Chronic midline low back pain M54.5, G89.29         Objective: The patient is awake, alert, and oriented x 3, knows its March 1, not sure of the day of the week.  Speech is dysfluent, slow and memory is intact.  She is psychomotor slow. Cranial Nerves: II  Visual fields are full to confrontation. III, IV, VI  Extraocular movements are intact. There is no nystagmus. V  Facial sensation is intact to pinprick. VII  Face is symmetrical.  VIII - Hearing is present. IX, X, XII  Palate is symmetrical.   XI - Shoulder shrugging and head turning intact  Motor: The patient has generalized weakness of both legs, worse on the right than the left. She's no better than 2+/5 on the right. Tone is normal. Reflexes are 2+ and symmetrical. Plantars are down going. Gait is abnormal, she limps off of the right leg, uses a cane in the right hand.     CBC:   Lab Results   Component Value Date/Time    WBC 6.6 07/13/2017 10:40 AM    RBC 5.50 (H) 07/13/2017 10:40 AM    HGB 13.3 07/13/2017 10:40 AM    HCT 40.3 07/13/2017 10:40 AM    PLATELET 182 68/68/0777 10:40 AM     BMP:   Lab Results   Component Value Date/Time    Glucose 85 05/17/2017 01:09 PM    Sodium 139 05/17/2017 01:09 PM    Potassium 4.9 05/17/2017 01:09 PM    Chloride 105 05/17/2017 01:09 PM    CO2 26 05/17/2017 01:09 PM    BUN 9 05/17/2017 01:09 PM    Creatinine 0.80 05/17/2017 01:09 PM    Calcium 9.3 05/17/2017 01:09 PM     CMP:   Lab Results   Component Value Date/Time    Glucose 85 05/17/2017 01:09 PM    Sodium 139 05/17/2017 01:09 PM    Potassium 4.9 05/17/2017 01:09 PM    Chloride 105 05/17/2017 01:09 PM    CO2 26 05/17/2017 01:09 PM    BUN 9 05/17/2017 01:09 PM    Creatinine 0.80 05/17/2017 01:09 PM    Calcium 9.3 05/17/2017 01:09 PM    Anion gap 8 05/17/2017 01:09 PM    BUN/Creatinine ratio 11 (L) 05/17/2017 01:09 PM    Alk. 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 Continued weakness of the right leg, no clearcut etiology established. Slow and slurred speech with no other cognitive deficits, unclear etiology. Plan: Will renew her medications I'm providing, which is only the Pamelor 50 mg BID for now. I also gave her 2 motnhs worth of he ranti-hypertensive medications in that she's recently lost her doctors and has no one to refill them. I really have very little to offer her at this time in reference to therapy and hope pain management can help her. She seems to have some unclear pain syndrome, possibly fibromyalgia. Will see back here in 6 months. Sincerely,        Toan Caldwell.  Anisa Traore M.D.

## 2018-12-18 ENCOUNTER — TELEPHONE (OUTPATIENT)
Dept: NEUROLOGY | Age: 53
End: 2018-12-18

## 2018-12-18 DIAGNOSIS — R60.9 EDEMA, UNSPECIFIED TYPE: ICD-10-CM

## 2018-12-21 RX ORDER — SPIRONOLACTONE 25 MG/1
25 TABLET ORAL 2 TIMES DAILY
Qty: 60 TAB | Refills: 1 | Status: SHIPPED | OUTPATIENT
Start: 2018-12-21

## 2018-12-21 NOTE — TELEPHONE ENCOUNTER
Spoke with pharmacy. They need clarification on the directions for Aldactone. Staff message forwarded to provider, awaiting response.

## 2018-12-21 NOTE — TELEPHONE ENCOUNTER
Patient called to get clarification on medication refills. She states she went to  meds and the pharmacy told her they were waiting on a response form the office before filling the prescriptions. Patient also states her blood pressure and heart rate are elevated and she needs the medications filled before the weekend and holidays.  Please advise

## 2018-12-28 DIAGNOSIS — M54.2 NECK PAIN: ICD-10-CM

## 2018-12-28 DIAGNOSIS — G89.4 CHRONIC PAIN SYNDROME: ICD-10-CM

## 2018-12-28 DIAGNOSIS — G89.29 CHRONIC BACK PAIN, UNSPECIFIED BACK LOCATION, UNSPECIFIED BACK PAIN LATERALITY: ICD-10-CM

## 2018-12-28 DIAGNOSIS — M50.30 OTHER CERVICAL DISC DEGENERATION, UNSPECIFIED CERVICAL REGION: ICD-10-CM

## 2018-12-28 DIAGNOSIS — M54.9 CHRONIC BACK PAIN, UNSPECIFIED BACK LOCATION, UNSPECIFIED BACK PAIN LATERALITY: ICD-10-CM

## 2018-12-28 NOTE — TELEPHONE ENCOUNTER
[facility] has called requesting a refill of their controlled medication, oxycodone, for the management of back pain. Last office visit date: 11/1/18 with Sinan Cintron, next 1/21/19 with Dagmar Walker  Last opioid care agreement 12/4/18  Last UDS was done 7/13/18    Date last  was pulled and reviewed : 12/28/18 last filled 12/4/18    Was the patient compliant when the above report was pulled? yes    Analgesia: 50%    Aberrancies: none    ADL's: yes    Adverse Reaction: no    Provider's last note and plan of care reviewed? yes  Request forwarded to provider for review.

## 2019-01-02 RX ORDER — OXYCODONE HYDROCHLORIDE 10 MG/1
TABLET ORAL
Qty: 80 TAB | Refills: 0 | OUTPATIENT
Start: 2019-01-02

## 2019-01-02 RX ORDER — OXYCODONE HYDROCHLORIDE 10 MG/1
TABLET ORAL
Qty: 70 TAB | Refills: 0 | Status: SHIPPED | OUTPATIENT
Start: 2019-01-02 | End: 2019-01-21 | Stop reason: SDUPTHER

## 2019-01-07 ENCOUNTER — TELEPHONE (OUTPATIENT)
Dept: NEUROLOGY | Age: 54
End: 2019-01-07

## 2019-01-07 RX ORDER — NORTRIPTYLINE HYDROCHLORIDE 25 MG/1
25 CAPSULE ORAL
Qty: 30 CAP | Refills: 5 | Status: SHIPPED | OUTPATIENT
Start: 2019-01-07 | End: 2019-06-17 | Stop reason: DRUGHIGH

## 2019-01-07 NOTE — TELEPHONE ENCOUNTER
Spoke with [facility] pharmacist, Cathren Apley, request for Rx per Patient for 75 mg nortriptyline. Rx written for 50 mg in capsules. MD to advise.

## 2019-01-07 NOTE — TELEPHONE ENCOUNTER
A prescription was sent to the pharmacy for 25 mg Pamelor to be taken at night. She is already taking 50 mg twice a day.

## 2019-01-07 NOTE — TELEPHONE ENCOUNTER
Spoke with Gladys with [facility] pharmacy, informed of confirmed 75 mg pamelor at night, and 50 mg in AM.

## 2019-01-21 ENCOUNTER — OFFICE VISIT (OUTPATIENT)
Dept: PAIN MANAGEMENT | Age: 54
End: 2019-01-21

## 2019-01-21 VITALS
RESPIRATION RATE: 22 BRPM | DIASTOLIC BLOOD PRESSURE: 104 MMHG | BODY MASS INDEX: 34.17 KG/M2 | HEIGHT: 71 IN | OXYGEN SATURATION: 98 % | TEMPERATURE: 96.1 F | HEART RATE: 91 BPM | SYSTOLIC BLOOD PRESSURE: 150 MMHG

## 2019-01-21 DIAGNOSIS — M54.9 SPINE PAIN: ICD-10-CM

## 2019-01-21 DIAGNOSIS — G89.4 CHRONIC PAIN SYNDROME: ICD-10-CM

## 2019-01-21 DIAGNOSIS — Z79.899 ENCOUNTER FOR LONG-TERM (CURRENT) USE OF HIGH-RISK MEDICATION: ICD-10-CM

## 2019-01-21 DIAGNOSIS — M50.30 DDD (DEGENERATIVE DISC DISEASE), CERVICAL: Primary | ICD-10-CM

## 2019-01-21 LAB
ALCOHOL UR POC: NORMAL
AMPHETAMINES UR POC: NEGATIVE
BARBITURATES UR POC: NORMAL
BENZODIAZEPINES UR POC: NEGATIVE
BUPRENORPHINE UR POC: NEGATIVE
CANNABINOIDS UR POC: NEGATIVE
CARISOPRODOL UR POC: NORMAL
COCAINE UR POC: NEGATIVE
FENTANYL UR POC: NORMAL
MDMA/ECSTASY UR POC: NORMAL
METHADONE UR POC: NEGATIVE
METHAMPHETAMINE UR POC: NORMAL
METHYLPHENIDATE UR POC: NORMAL
OPIATES UR POC: NEGATIVE
OXYCODONE UR POC: NORMAL
PHENCYCLIDINE UR POC: NORMAL
PROPOXYPHENE UR POC: NORMAL
TRAMADOL UR POC: NORMAL
TRICYCLICS UR POC: NORMAL

## 2019-01-21 RX ORDER — CYCLOBENZAPRINE HCL 5 MG
5 TABLET ORAL
Qty: 90 TAB | Refills: 3 | Status: SHIPPED | OUTPATIENT
Start: 2019-01-21 | End: 2019-01-22 | Stop reason: SDUPTHER

## 2019-01-21 RX ORDER — OXYCODONE HYDROCHLORIDE 10 MG/1
TABLET ORAL
Qty: 70 TAB | Refills: 0 | Status: SHIPPED | OUTPATIENT
Start: 2019-02-01 | End: 2019-02-26 | Stop reason: SDUPTHER

## 2019-01-21 RX ORDER — ACETAMINOPHEN 500 MG
TABLET ORAL
Qty: 200 TAB | Refills: 0 | Status: SHIPPED | OUTPATIENT
Start: 2019-01-21 | End: 2019-01-22 | Stop reason: SDUPTHER

## 2019-01-21 NOTE — PATIENT INSTRUCTIONS
Preventing Falls: Care Instructions Your Care Instructions Getting around your home safely can be a challenge if you have injuries or health problems that make it easy for you to fall. Loose rugs and furniture in walkways are among the dangers for many older people who have problems walking or who have poor eyesight. People who have conditions such as arthritis, osteoporosis, or dementia also have to be careful not to fall. You can make your home safer with a few simple measures. Follow-up care is a key part of your treatment and safety. Be sure to make and go to all appointments, and call your doctor if you are having problems. It's also a good idea to know your test results and keep a list of the medicines you take. How can you care for yourself at home? Taking care of yourself · You may get dizzy if you do not drink enough water. To prevent dehydration, drink plenty of fluids, enough so that your urine is light yellow or clear like water. Choose water and other caffeine-free clear liquids. If you have kidney, heart, or liver disease and have to limit fluids, talk with your doctor before you increase the amount of fluids you drink. · Exercise regularly to improve your strength, muscle tone, and balance. Walk if you can. Swimming may be a good choice if you cannot walk easily. · Have your vision and hearing checked each year or any time you notice a change. If you have trouble seeing and hearing, you might not be able to avoid objects and could lose your balance. · Know the side effects of the medicines you take. Ask your doctor or pharmacist whether the medicines you take can affect your balance. Sleeping pills or sedatives can affect your balance. · Limit the amount of alcohol you drink. Alcohol can impair your balance and other senses. · Ask your doctor whether calluses or corns on your feet need to be removed.  If you wear loose-fitting shoes because of calluses or corns, you can lose your balance and fall. · Talk to your doctor if you have numbness in your feet. Preventing falls at home · Remove raised doorway thresholds, throw rugs, and clutter. Repair loose carpet or raised areas in the floor. · Move furniture and electrical cords to keep them out of walking paths. · Use nonskid floor wax, and wipe up spills right away, especially on ceramic tile floors. · If you use a walker or cane, put rubber tips on it. If you use crutches, clean the bottoms of them regularly with an abrasive pad, such as steel wool. · Keep your house well lit, especially [employer], and outside walkways. Use night-lights in areas such as hallways and bathrooms. Add extra light switches or use remote switches (such as switches that go on or off when you clap your hands) to make it easier to turn lights on if you have to get up during the night. · Install sturdy handrails on stairways. · Move items in your cabinets so that the things you use a lot are on the lower shelves (about waist level). · Keep a cordless phone and a flashlight with new batteries by your bed. If possible, put a phone in each of the main rooms of your house, or carry a cell phone in case you fall and cannot reach a phone. Or, you can wear a device around your neck or wrist. You push a button that sends a signal for help. · Wear low-heeled shoes that fit well and give your feet good support. Use footwear with nonskid soles. Check the heels and soles of your shoes for wear. Repair or replace worn heels or soles. · Do not wear socks without shoes on wood floors. · Walk on the grass when the sidewalks are slippery. If you live in an area that gets snow and ice in the winter, sprinkle salt on slippery steps and sidewalks. Preventing falls in the bath · Install grab bars and nonskid mats inside and outside your shower or tub and near the toilet and sinks. · Use shower chairs and bath benches. · Use a hand-held shower head that will allow you to sit while showering. · Get into a tub or shower by putting the weaker leg in first. Get out of a tub or shower with your strong side first. 
· Repair loose toilet seats and consider installing a raised toilet seat to make getting on and off the toilet easier. · Keep your bathroom door unlocked while you are in the shower. Where can you learn more? Go to http://uriel-percy.info/. Enter [id number] in the search box to learn more about \"Preventing Falls: Care Instructions. \" Current as of: March 15, 2018 Content Version: 11.9 © 3604-5336 [employer]. Care instructions adapted under license by [employer] (which disclaims liability or warranty for this information). If you have questions about a medical condition or this instruction, always ask your healthcare professional. [place] [address] any warranty or liability for your use of this information. Safe Use of Opioid Pain Medicine: Care Instructions Your Care Instructions Pain is your body's way of warning you that something is wrong. Pain feels different for everybody. Only you can describe your pain. A doctor can suggest or prescribe many types of medicines for pain. These range from over-the-counter medicines like acetaminophen (Tylenol) to powerful medicines called opioids. Examples of opioids are fentanyl, hydrocodone, morphine, and oxycodone. Heroin is an illegal opioid Opioids are strong medicines. They can help you manage pain when you use them the right way. But if you misuse them, they can cause serious harm and even death. For these reasons, doctors are very careful about how they prescribe opioids. If you decide to take opioids, here are some things to remember. · Keep your doctor informed. You can get addicted to opioids. The risk is higher if you have a history of substance use.  Your doctor will monitor you closely for signs of misuse and addiction and to figure out when you no longer need to take opioids. · Make a treatment plan. The goal of your plan is to be able to function and do the things you need to do, even if you still have some pain. You might be able to manage your pain with other non-opioid options like physical therapy, relaxation, or over-the-counter pain medicines. · Be aware of the side effects. Opioids can cause serious side effects, such as constipation, dry mouth, and nausea. And over time, you may need a higher dose to get pain relief. This is called tolerance. Your body also gets used to opioids. This is called physical dependence. If you suddenly stop taking them, you may have withdrawal symptoms. The doctor carefully considered what pain medicine is right for you. You may not have received opioids if your doctor was concerned about drug interactions or your safety, or if he or she had other concerns. It is best to have one doctor or clinic treat your pain. This way you will get the pain medicine that will help you the most. And a doctor will be able to watch for any problems that the medicine might cause. The doctor has checked you carefully, but problems can develop later. If you notice any problems or new symptoms,  get medical treatment right away. Follow-up care is a key part of your treatment and safety. Be sure to make and go to all appointments, and call your doctor if you are having problems. It's also a good idea to know your test results and keep a list of the medicines you take. How can you care for yourself at home? If you need to take opioids to manage your pain, remember these safety tips. · Follow directions carefully. It's easy to misuse opioids if you take a dose other than what's prescribed by your doctor. This can lead to overdose and even death. Even sharing them with someone they weren't meant for is misuse. · Be cautious. Opioids may affect your judgment and decision making. Do not drive or operate machinery until you can think clearly. Talk with your doctor about when it is safe to drive. · Reduce the risk of drug interactions. Opioids can be dangerous if you take them with alcohol or with certain drugs like sleeping pills and muscle relaxers. Make sure your doctor knows about all the other medicines you take, including over-the-counter medicines. Don't start any new medicines before you talk to your doctor or pharmacist. 
· Safely store and dispose of opioids. Store opioids in a safe and secure place. Make sure that pets, children, friends, and family can't get to them. When you're done using opioids, make sure to dispose of them safely and as quickly as possible. The U[place]. Food and Drug Administration (FDA) recommends these disposal options. ? The best option is to take your medicine to a drop-off box or take-back program that is authorized by the [facility] ([facility]). ? If these programs aren't available in your area and your medicine doesn't have specific disposal instructions (such as flushing), you can throw them into your household trash if you follow the FDA's instructions. Visit fda.gov and search for \"unused medicine disposal.\" 
? If you have opioid patches (used or unused), your options are to take them to a [facility]-authorized site or flush them down the toilet. Do not throw them in the trash. ? Only flush your medicine down the toilet if you can't get to a [facility]-approved site or your medicine instructions state clearly to flush them. · Reduce the risk of overdose. Misuse of opioids can be very dangerous. Protect yourself by asking your doctor about a naloxone rescue kit. It can help youand even save your lifeif you take too much of an opioid. Try other ways to reduce pain. · Relax, and reduce stress. Relaxation techniques such as deep breathing or meditation can help. · Keep moving. Gentle, daily exercise can help reduce pain over the long run. Try low- or no-impact exercises such as walking, swimming, and stationary biking. Do stretches to stay flexible. · Try heat, cold packs, and massage. · Get enough sleep. Pain can make you tired and drain your energy. Talk with your doctor if you have trouble sleeping because of pain. · Think positive. Your thoughts can affect your pain level. Do things that you enjoy to distract yourself when you have pain instead of focusing on the pain. See a movie, read a book, listen to music, or spend time with a friend. If you are not taking a prescription pain medicine, ask your doctor if you can take an over-the-counter medicine. When should you call for help? Call your doctor now or seek immediate medical care if: 
  · You have a new kind of pain.  
  · You have new symptoms, such as a fever or rash, along with the pain.  
 Watch closely for changes in your health, and be sure to contact your doctor if: 
  · You think you might be using too much pain medicine, and you need help to use less or stop.  
  · Your pain gets worse.  
  · You would like a referral to a doctor or clinic that specializes in pain management. Where can you learn more? Go to http://uriel-percy.info/. Enter R108 in the search box to learn more about \"Safe Use of Opioid Pain Medicine: Care Instructions. \" Current as of: Laura 3, 2018 Content Version: 11.9 © 9223-7885 [employer]. Care instructions adapted under license by [employer] (which disclaims liability or warranty for this information). If you have questions about a medical condition or this instruction, always ask your healthcare professional. [place] [address] any warranty or liability for your use of this information.

## 2019-01-21 NOTE — PROGRESS NOTES
Social History Socioeconomic History  Marital status:  Spouse name: Not on file  Number of children: Not on file  Years of education: Not on file  Highest education level: Not on file Social Needs  Financial resource strain: Not on file  Food insecurity - worry: Not on file  Food insecurity - inability: Not on file  Transportation needs - medical: Not on file  Transportation needs - non-medical: Not on file Occupational History  Occupation: nurse assistant Tobacco Use  Smoking status: Former Smoker Types: Cigarettes  Smokeless tobacco: Never Used  Tobacco comment: Smoke for 26 years Substance and Sexual Activity  Alcohol use: No  
 Drug use: No  
 Sexual activity: Yes  
  Partners: Male Birth control/protection: None Other Topics Concern  Not on file Social History Narrative  Not on file Family History Problem Relation Age of Onset  Heart Attack Mother  Heart Disease Mother  Diabetes Mother  Pacemaker Mother  Stroke Father  Cancer Father  Hypertension Maternal Grandmother  Heart Attack Maternal Grandmother  Diabetes Maternal Grandmother  Cancer Maternal Grandmother  Hypertension Maternal Grandfather  Diabetes Maternal Grandfather  Hypertension Paternal Grandmother  Diabetes Paternal Grandmother  Breast Cancer Paternal Grandmother  Hypertension Paternal Grandfather  Diabetes Paternal Grandfather  Heart Attack Sister  Breast Cancer Maternal Aunt  Cancer Maternal Aunt  Breast Cancer Paternal Aunt  Cancer Maternal Uncle  Cancer Niece Allergies Allergen Reactions  Latex Rash  Lisinopril Cough, Swelling and Shortness of Breath Ace Inhibitor - cough Past Medical History:  
Diagnosis Date  Abnormal nuclear cardiac imaging test 07/10/2012 Mod mid to distal anterior septal ischemia. EF 47%.   Mid to distal anterior septal hypk. Normal EKG portion of stress test.  Intermediate high risk.  Asthma  Back pain   
 injury at work June 2014  Coronary atherosclerosis of native coronary artery   
 angiographically normal coronaries with dLAD bridging (july 2012)  Diabetes mellitus type II, controlled ([facility] [address]  Forgetfulness   
 since injury June 2014  Fracture of left humerus  GERD (gastroesophageal reflux disease)  Gout  Headache(784.0)  Hypertension  Lack of coordination   
 since injury June 2014  Neck pain   
 injury at work June 2014  Obesity  S/P cardiac cath 07/10/2012 dLAD w/mild to mod bridging. Otherwise patent coronaries. At least mod LVH. EF 65%. Past Surgical History:  
Procedure Laterality Date  HX APPENDECTOMY  HX GI    
 Gallbladder  HX HEART CATHETERIZATION  7/10/2012  HX ORTHOPAEDIC    
 left arm Current Outpatient Medications on File Prior to Visit Medication Sig  
 nortriptyline (PAMELOR) 25 mg capsule Take 1 Cap by mouth nightly. (Patient taking differently: Take 75 mg by mouth nightly.)  spironolactone (ALDACTONE) 25 mg tablet Take 1 Tab by mouth two (2) times a day.  cloNIDine HCl (CATAPRES) 0.1 mg tablet TAKE ONE TABLET BY MOUTH TWICE DAILY  carvedilol (COREG) 25 mg tablet Take 1 Tab by mouth two (2) times daily (with meals).  nortriptyline (PAMELOR) 50 mg capsule Take 1 Cap by mouth ACB/HS.  naproxen EC (EC-NAPROSYN) 375 mg TbEC Take 1 Tab by mouth two (2) times daily as needed.  TENS Units (TENS 504) gee Please provide patient with a TENS unit to use on her lumbosacral region and her cervicothoracic region for chronic pain issues. Indications: For chronic lumbar and cervical pain. Indications: Chronic Pain  tens unit electrodes (TENS UNITS ELECTRODES) 2X2 \" pads For chronic lumbar and cervical pain. Indications: Chronic Pain  metFORMIN (GLUCOPHAGE) 1,000 mg tablet Take 1 Tab by mouth two (2) times daily (with meals).  allopurinol (ZYLOPRIM) 100 mg tablet Take 1 Tab by mouth two (2) times a day.  lovastatin (MEVACOR) 20 mg tablet Take 1 Tab by mouth nightly.  oxybutynin (DITROPAN) 5 mg tablet Take 1 Tab by mouth two (2) times a day.  glucose blood VI test strips (RELION PRIME TEST STRIPS) strip Use as directed  albuterol (PROVENTIL HFA, VENTOLIN HFA, PROAIR HFA) 90 mcg/actuation inhaler Take 2 Puffs by inhalation every four (4) hours as needed for Wheezing. [facility] Wheelchair  multivitamin (ONE A DAY) tablet Take 1 tablet by mouth daily.  oxyCODONE IR (ROXICODONE) 10 mg tab immediate release tablet Take 1 tablet up to 3 times daily as needed for pain with a total of 80 tablets of budgeted for 30 days.  naloxone 4 mg/actuation spry 4 mg by Nasal route as needed for up to 2 doses. Indications: OPIOID TOXICITY No current facility-administered medications on file prior to visit. Referred by Worker's Compensation for low back pain and neck pain Pt was last seen here on November 1, 2018 Calculated MEQ -up to 45 Naloxone rescue - Yes Prophylactic bowel program -no Date of last OCA December 2018. Last UDS July 18, 2018. UDS repeated today and point-of-care findings were consistent. , date checked  today, findings were consistent. GIC-1 and 8 FLORENTINO -80% COMM-4 
 
HPI: 
Jurgen Guy is a 48 y.o. female here for f/u visit for ongoing evaluation of low back pain and neck pain. Pt denies interval changes in the character or distribution of pain. Pain is located bilaterally across the lumbosacral band with radiation down the right lower extremity to the plantar surface of the foot and all 5 toes. Her cervical pain is across bilateral upper trapezius region bilateral cervical paraspinals and up into her head.   She describes all of the pain as aching, burning, numbness, pins and needles, and stabbing. She is reporting that her pain ranges from 7-10/10. She reports that the pain is worsened by any kind of movement or activity. She also states the only thing that helps her pain is her opioid medications. She states that she has had increased intensity of her pain symptoms during the current opioid reduction. --Home health referral was denied.  Referral to pain management placed last visit has not been executed.  Naproxen 375 mg twice daily as needed has not provided noticeable change. Requested x-rays not obtained, states they were instructed to,\"not worry about them\" so they did not have it done. Home exercise program for piriformis stretching has not been done.  Topicals Biofreeze and Aspercreme are mildly helpful. --lidocaine patches were not helpful TENS unit has not been used due to need for electrodes. Tylenol up to 3000 mg daily as needed for pain has not been taken. Pamelor 75 mg nightly helps with HA and sleep 
--Oxycodone immediate release 10 mg up to 3 times daily with 70 tablets budgeted over 30 days. Pt reports partial but incomplete analgesia with the current opioid regimen which helps to improve activity tolerance and function. She states that activity tolerance and function have declined as the opioid reduction has progressed. Pt denies aberrant behaviors or any adverse effects. Reports difficulty with bathing, grooming, balance in the shower, food preparation,  home care. She also reports multiple accidents in the kitchen which have led to injury. ROS:Review of systems is negative for fever, chills, nausea, vomiting, diarrhea, constipation, chest pain, shortness of breath, abdominal pain, trouble swallowing, acute changes in vision, acute changes in hearing,  anxiety, suicidal ideation, homicidal ideation, alcohol use.  
Review of systems positive for weakness, falls, dizziness, bowel accidents, bladder accidents, depression. Opioid specific risk:Subjective reports memory deficits, obesity, asthma, polypharmacy,, long-term opioid therapy, depression, TBI, falls Vitals:  
 01/21/19 1445 BP: (!) 150/104 Pulse: 91  
Resp: 22 Temp: 96.1 °F (35.6 °C) TempSrc: Oral  
SpO2: 98% Height: 5' 11\" (1.803 m) PainSc:   8 LMP: 12/25/2018 PE: 
AFVSS except elevated blood pressure, no acute distress, endomorphic body habitus. A&OXs 3. Conjugate gaze, clear sclerae. Speech is dysarthric with decreased frances and prosody but otherwise appropriate. Mood is pleasant and appropriate. Patient is somewhat cooperative. Gait is modified independent with [place] cane and antalgia and ataxia Balance modified independent with furniture or [place] cane. Primary Care Physician Unknown, Provider Patient not available to ask None PHQ -- . PHQ over the last two weeks 1/21/2019 Little interest or pleasure in doing things Nearly every day Feeling down, depressed, irritable, or hopeless Not at all Total Score PHQ 2 3 Trouble falling or staying asleep, or sleeping too much Nearly every day Feeling tired or having little energy Nearly every day Poor appetite, weight loss, or overeating Not at all Feeling bad about yourself - or that you are a failure or have let yourself or your family down Not at all Trouble concentrating on things such as school, work, reading, or watching TV Not at all Moving or speaking so slowly that other people could have noticed; or the opposite being so fidgety that others notice Not at all Thoughts of being better off dead, or hurting yourself in some way Not at all PHQ 9 Score 9 How difficult have these problems made it for you to do your work, take care of your home and get along with others Extremely difficult Assessment/Plan: ICD-10-CM ICD-9-CM 1.  DDD (degenerative disc disease), cervical M50.30 722.4 acetaminophen (TYLENOL) 500 mg tablet  
   cyclobenzaprine (FLEXERIL) 5 mg tablet  
   oxyCODONE IR (ROXICODONE) 10 mg tab immediate release tablet REFERRAL TO PAIN CLINIC 2. Encounter for long-term (current) use of high-risk medication Z79.899 V58.69 DRUG SCREEN  
   AMB POC DRUG SCREEN () REFERRAL TO PAIN CLINIC 3. Spine pain M54.9 724.5 acetaminophen (TYLENOL) 500 mg tablet  
   cyclobenzaprine (FLEXERIL) 5 mg tablet  
   oxyCODONE IR (ROXICODONE) 10 mg tab immediate release tablet REFERRAL TO PAIN CLINIC 4. Chronic pain syndrome G89.4 338.4 acetaminophen (TYLENOL) 500 mg tablet  
   cyclobenzaprine (FLEXERIL) 5 mg tablet  
   oxyCODONE IR (ROXICODONE) 10 mg tab immediate release tablet REFERRAL TO PAIN CLINIC  
  
--F/u with PCP regarding HTN. Patient to follow-up as needed in urgent care or emergency department until she establishes with a new primary care provider. She is to establish with a new primary care provider as soon as possible. --I do not recommend long-term opioid therapy for this person's chronic pain. I believe the risks outweigh any potential benefits. We will pause the gradual opioid wean. Patient was educated on signs and symptoms of opioid withdrawal and advised to call the clinic should these symptoms arise so that we may provide support as needed. --oxyconone IR 10 mg tablets up to 3 times daily with 70 tablets budgeted over 30 days. Maintain this rate until next follow-up visit 
--Tylenol up to 1000mg TID as needed for pain. --We will provide a trial of Flexeril 5 mg tablets up to 3 times daily as needed for pain and spasm. --d/c Naproxen as she was reporting absolutely no benefit. --Patient wishes to transfer her care to her previous provider Dr. Pao Valentino and I see no reason why she should be denied this continuity of care as she has a long-standing relationship with this provider.   I recommend the transfer of care as the patient repeatedly reports dissatisfaction with our current model for pain care. --If patient remains with our practice she will be referred for evaluation for implantable pain pump. --Continue to follow with neurology for headaches. --Patient will be referred for pain psychology for training in cognitive behavioral therapy, acceptance commitment therapy, biofeedback, mindfulness meditation and other modalities as indicated to help improve her resilience and self coping mechanisms so that she can better self manage her chronic pain. --We will request home health aide to assist with activities of daily living as patient has reported incidences where she has dropped things on the stove while attempting to cook leading to burns, patient has difficulty showering and bathing and needs assistance from family members, patient has difficulty with grooming and is unable to care for her home. --A 2 and 1 shower chair has been requested GOALS: 
To establish complementary and integrative plan of care to address chronic pain issues while minimizing pharmaceuticals to maximize patient's function improve quality of life. Education: 
Patient again educated on the importance of strict compliance with the opioid care agreement while on opioid therapy. Patient also again educated that they should avoid driving while on chronic opioid therapy. Also advised to avoid alcohol and to avoid benzodiazepines while on opioid therapy. F/u:. Follow-up Disposition: 
Return in about 3 months (around 4/21/2019) for 30 min.

## 2019-01-21 NOTE — PROGRESS NOTES
Nursing Notes Patient presents to the office today in follow-up. Patient rates her pain at 8/10 on the numerical pain scale. Reviewed medications with counts as follows:   
Rx Date filled Qty Dispensed Pill Count Last Dose Short Oxycodone 10 mg 01/03/19 70 15 today Last opioid agreement 12/03/18 Last urine drug screen 07/18/18 Comments: POC UDS was performed in office today. Any new labs or imaging since last appointment? NO Have you been to an emergency room (ER) or urgent care clinic since your last visit? NO Have you been hospitalized since your last visit? NO If yes, where, when, and reason for visit? Have you seen or consulted any other health care providers outside of the [address] [place] [address]  since your last visit? YES If yes, where, when, and reason for visit? neurology Ms. Rangel has a reminder for a \"due or due soon\" health maintenance. I have asked that she contact her primary care provider for follow-up on this health maintenance. PHQ over the last two weeks 1/21/2019 Little interest or pleasure in doing things Nearly every day Feeling down, depressed, irritable, or hopeless Not at all Total Score PHQ 2 3 Trouble falling or staying asleep, or sleeping too much Nearly every day Feeling tired or having little energy Nearly every day Poor appetite, weight loss, or overeating Not at all Feeling bad about yourself - or that you are a failure or have let yourself or your family down Not at all Trouble concentrating on things such as school, work, reading, or watching TV Not at all Moving or speaking so slowly that other people could have noticed; or the opposite being so fidgety that others notice Not at all Thoughts of being better off dead, or hurting yourself in some way Not at all PHQ 9 Score 9 How difficult have these problems made it for you to do your work, take care of your home and get along with others Extremely difficult Pt is not currently being treated for her depression. Provider made aware of above score.

## 2019-01-22 RX ORDER — CYCLOBENZAPRINE HCL 5 MG
5 TABLET ORAL
Qty: 90 TAB | Refills: 3 | Status: SHIPPED | OUTPATIENT
Start: 2019-01-22 | End: 2019-02-21

## 2019-01-22 RX ORDER — ACETAMINOPHEN 500 MG
TABLET ORAL
Qty: 200 TAB | Refills: 0 | Status: SHIPPED | OUTPATIENT
Start: 2019-01-22 | End: 2019-04-22 | Stop reason: SDUPTHER

## 2019-01-23 ENCOUNTER — TELEPHONE (OUTPATIENT)
Dept: PAIN MANAGEMENT | Age: 54
End: 2019-01-23

## 2019-01-23 ENCOUNTER — DOCUMENTATION ONLY (OUTPATIENT)
Dept: PAIN MANAGEMENT | Age: 54
End: 2019-01-23

## 2019-01-23 NOTE — TELEPHONE ENCOUNTER
The pt was in the office on 01/21/19 for an OV. She stated that the tens unit company has been trying to contact the office and has sent over several faxes over the last couple of months for a request for more electrodes for her machine. Ms. Arjun Aguilar states that our office has not responded to any of them. I called and spoke to Remberto Avelar at [facility]. This is the tens unit company that the pt's order was faxed over to back in May 2018. She did verify that they sent the pt a tens unit. I asked her about the supplies order and how they obtain this. She stated that they never reach out to the ordering office for more supplies. Once the tens unit is ordered it is an automatic. She states that the pt usually contacts them for more and they ship this to the pt. Ms. Shakri Hook states that she will contact the pt to explain this to her and get these electrodes out to her. She states that there was nothing in the pt's file about requesting more. Pt's number was confirmed with rep.

## 2019-01-23 NOTE — PROGRESS NOTES
All of the orders/referrals that were ordered for the pt on 01/21/19 were faxed over to the pt's workers comp  on file, [employer]. The referrals for pain management, home health, and pain psychology as well as the order for durable medical equipment. The last office note and demographic was also sent over with the orders. They were faxed to [phone]. A fax confirmation was received and they should handle the referrals and DME order. Pt and her sister have a copy of all of these referrals and orders for their records.

## 2019-02-26 DIAGNOSIS — M50.30 DDD (DEGENERATIVE DISC DISEASE), CERVICAL: ICD-10-CM

## 2019-02-26 DIAGNOSIS — G89.4 CHRONIC PAIN SYNDROME: ICD-10-CM

## 2019-02-26 DIAGNOSIS — M54.9 SPINE PAIN: ICD-10-CM

## 2019-02-26 NOTE — TELEPHONE ENCOUNTER
Prakash Pagan has called requesting a refill of their controlled medication, oxycodone, for the management of DDD pain. Last office visit date: 1/21/19 with Simona Shearer, next 4/22/19 with Monik Sam  Last opioid care agreement 12/4/18  Last UDS was done 1/21/19    Date last  was pulled and reviewed : 2/26/19  Last fill date for medication was 1/31/19    Was the patient compliant when the above report was pulled? yes    Analgesia: 30%    Aberrancies: none    ADL's: no, needs family's help    Adverse Reaction: no    Provider's last note and plan of care reviewed? yes  Request forwarded to provider for review.

## 2019-02-27 ENCOUNTER — DOCUMENTATION ONLY (OUTPATIENT)
Dept: PAIN MANAGEMENT | Age: 54
End: 2019-02-27
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
## 2019-02-27 DIAGNOSIS — M51.37 DEGENERATION OF LUMBAR OR LUMBOSACRAL INTERVERTEBRAL DISC: ICD-10-CM

## 2019-02-27 DIAGNOSIS — G89.21 CHRONIC PAIN DUE TO TRAUMA: ICD-10-CM

## 2019-02-27 DIAGNOSIS — M50.30 DEGENERATION OF CERVICAL INTERVERTEBRAL DISC: Primary | ICD-10-CM

## 2019-02-27 DIAGNOSIS — M54.12 BRACHIAL NEURITIS: ICD-10-CM

## 2019-02-27 NOTE — PROGRESS NOTES
The pt's new updated referral, last office note, and demographics were faxed over to the pt's workers' comp  on file, [employer]. A fax confirmation was received. Attempted to contact the pt to make her aware and she was not able to be reached. A message was not left for the pt because there was no pt identification on the voice mail. No other number listed for the pt.

## 2019-02-28 RX ORDER — OXYCODONE HYDROCHLORIDE 10 MG/1
10 TABLET ORAL
Qty: 70 TAB | Refills: 0 | Status: SHIPPED | OUTPATIENT
Start: 2019-03-02 | End: 2019-03-20 | Stop reason: SDUPTHER

## 2019-02-28 NOTE — TELEPHONE ENCOUNTER
Called patient number,  Freddie Stoll on HIPAA  patient identified using two patient identifiers (name and ); patient advised prescriptions are ready for pick-up.  Reminded that patient needs to establish care with PCP

## 2019-02-28 NOTE — TELEPHONE ENCOUNTER
Please remind patient to establish care with a primary care provider. This is a requirement for being treated in our clinic. Reviewed. OK to distribute prescription.

## 2019-03-20 DIAGNOSIS — M50.30 DDD (DEGENERATIVE DISC DISEASE), CERVICAL: ICD-10-CM

## 2019-03-20 DIAGNOSIS — M54.9 SPINE PAIN: ICD-10-CM

## 2019-03-20 NOTE — TELEPHONE ENCOUNTER
Bette Galvez has called requesting a refill of their controlled medication, Roxicodone 10 mg tab, for the management of low back pain and neck pain. Last office visit date: 1/21/19 with Key Garzon and has a f/u appt on 4/22/19 with DEANA Griffith. Last opioid care agreement 12/3/18  Last UDS was done 1/21/19    Date last  was pulled and reviewed : 3/20/19  Last fill date for medication was 3/1/19    Was the patient compliant when the above report was pulled? yes    Analgesia: Patient reports 25% pain relief on current regimen. Aberrancies: No aberrancies noted in the last 30 days. ADL's: Patient states they require assistance with their ADL's on current regimen. Adverse Reaction: Patient reports no adverse reactions at this time. Provider's last note and plan of care reviewed? yes  Request forwarded to provider for review.

## 2019-03-21 ENCOUNTER — HOSPITAL ENCOUNTER (OUTPATIENT)
Dept: LAB | Age: 54
Discharge: HOME | End: 2019-03-21
Payer: MEDICARE

## 2019-03-21 LAB
25(OH)D3 SERPL-MCNC: 24.5 NG/ML (ref 30–100)
ALBUMIN SERPL-MCNC: 4.3 G/DL (ref 3.4–5)
ALBUMIN/GLOB SERPL: 1.3 {RATIO} (ref 0.8–1.7)
ALP SERPL-CCNC: 64 U/L (ref 45–117)
ALT SERPL-CCNC: 31 U/L (ref 13–56)
ANION GAP SERPL CALC-SCNC: 5 MMOL/L (ref 3–18)
AST SERPL-CCNC: 17 U/L (ref 15–37)
BASOPHILS # BLD: 0 K/UL (ref 0–0.1)
BASOPHILS NFR BLD: 0 % (ref 0–2)
BILIRUB SERPL-MCNC: 0.3 MG/DL (ref 0.2–1)
BUN SERPL-MCNC: 13 MG/DL (ref 7–18)
BUN/CREAT SERPL: 15 (ref 12–20)
CALCIUM SERPL-MCNC: 9.4 MG/DL (ref 8.5–10.1)
CHLORIDE SERPL-SCNC: 106 MMOL/L (ref 100–108)
CHOLEST SERPL-MCNC: 165 MG/DL
CO2 SERPL-SCNC: 29 MMOL/L (ref 21–32)
CREAT SERPL-MCNC: 0.89 MG/DL (ref 0.6–1.3)
CREAT UR-MCNC: 275 MG/DL (ref 30–125)
DIFFERENTIAL METHOD BLD: ABNORMAL
EOSINOPHIL # BLD: 0.2 K/UL (ref 0–0.4)
EOSINOPHIL NFR BLD: 3 % (ref 0–5)
ERYTHROCYTE [DISTWIDTH] IN BLOOD BY AUTOMATED COUNT: 14.1 % (ref 11.6–14.5)
GLOBULIN SER CALC-MCNC: 3.2 G/DL (ref 2–4)
GLUCOSE SERPL-MCNC: 82 MG/DL (ref 74–99)
HCT VFR BLD AUTO: 39.7 % (ref 35–45)
HDLC SERPL-MCNC: 41 MG/DL (ref 40–60)
HDLC SERPL: 4 {RATIO} (ref 0–5)
HGB BLD-MCNC: 13.2 G/DL (ref 12–16)
LDLC SERPL CALC-MCNC: 99.8 MG/DL (ref 0–100)
LIPID PROFILE,FLP: NORMAL
LYMPHOCYTES # BLD: 0.9 K/UL (ref 0.9–3.6)
LYMPHOCYTES NFR BLD: 14 % (ref 21–52)
MCH RBC QN AUTO: 24.4 PG (ref 24–34)
MCHC RBC AUTO-ENTMCNC: 33.2 G/DL (ref 31–37)
MCV RBC AUTO: 73.4 FL (ref 74–97)
MICROALBUMIN UR-MCNC: 2.77 MG/DL (ref 0–3)
MICROALBUMIN/CREAT UR-RTO: 10 MG/G (ref 0–30)
MONOCYTES # BLD: 0.7 K/UL (ref 0.05–1.2)
MONOCYTES NFR BLD: 10 % (ref 3–10)
NEUTS SEG # BLD: 4.9 K/UL (ref 1.8–8)
NEUTS SEG NFR BLD: 73 % (ref 40–73)
PLATELET # BLD AUTO: 226 K/UL (ref 135–420)
PLATELET COMMENTS,PCOM: ABNORMAL
PMV BLD AUTO: 11.3 FL (ref 9.2–11.8)
POTASSIUM SERPL-SCNC: 4.6 MMOL/L (ref 3.5–5.5)
PROT SERPL-MCNC: 7.5 G/DL (ref 6.4–8.2)
RBC # BLD AUTO: 5.41 M/UL (ref 4.2–5.3)
RBC MORPH BLD: ABNORMAL
SODIUM SERPL-SCNC: 140 MMOL/L (ref 136–145)
TRIGL SERPL-MCNC: 121 MG/DL (ref ?–150)
VLDLC SERPL CALC-MCNC: 24.2 MG/DL
WBC # BLD AUTO: 6.7 K/UL (ref 4.6–13.2)

## 2019-03-21 PROCEDURE — 85025 COMPLETE CBC W/AUTO DIFF WBC: CPT

## 2019-03-21 PROCEDURE — 80053 COMPREHEN METABOLIC PANEL: CPT

## 2019-03-21 PROCEDURE — 84436 ASSAY OF TOTAL THYROXINE: CPT

## 2019-03-21 PROCEDURE — 36415 COLL VENOUS BLD VENIPUNCTURE: CPT

## 2019-03-21 PROCEDURE — 82306 VITAMIN D 25 HYDROXY: CPT

## 2019-03-21 PROCEDURE — 82043 UR ALBUMIN QUANTITATIVE: CPT

## 2019-03-21 PROCEDURE — 80061 LIPID PANEL: CPT

## 2019-03-21 RX ORDER — OXYCODONE HYDROCHLORIDE 10 MG/1
10 TABLET ORAL
Qty: 70 TAB | Refills: 0 | Status: SHIPPED | OUTPATIENT
Start: 2019-03-30 | End: 2019-04-22 | Stop reason: SDUPTHER

## 2019-03-21 NOTE — TELEPHONE ENCOUNTER
10:56 am I LVM for the patient to call back, phone number was provided. This call was to notify the patient that her Rx for Oxycodone is ready for .

## 2019-03-22 ENCOUNTER — TELEPHONE (OUTPATIENT)
Dept: PAIN MANAGEMENT | Age: 54
End: 2019-03-22

## 2019-03-22 LAB
T4 SERPL-MCNC: 8.1 UG/DL (ref 4.7–13.3)
TSH SERPL DL<=0.05 MIU/L-ACNC: 0.96 UIU/ML (ref 0.36–3.74)

## 2019-03-22 NOTE — TELEPHONE ENCOUNTER
09:19 am The patient had LVM during my PTO time, I returned her call upon my return and I LVM for the patient to call back. Phone number was provided.

## 2019-04-22 ENCOUNTER — OFFICE VISIT (OUTPATIENT)
Dept: PAIN MANAGEMENT | Age: 54
End: 2019-04-22

## 2019-04-22 VITALS
RESPIRATION RATE: 24 BRPM | TEMPERATURE: 97 F | HEART RATE: 76 BPM | OXYGEN SATURATION: 99 % | DIASTOLIC BLOOD PRESSURE: 84 MMHG | BODY MASS INDEX: 34.3 KG/M2 | WEIGHT: 245 LBS | SYSTOLIC BLOOD PRESSURE: 124 MMHG | HEIGHT: 71 IN

## 2019-04-22 DIAGNOSIS — G89.4 CHRONIC PAIN SYNDROME: ICD-10-CM

## 2019-04-22 DIAGNOSIS — M50.30 DDD (DEGENERATIVE DISC DISEASE), CERVICAL: Primary | ICD-10-CM

## 2019-04-22 DIAGNOSIS — Z79.899 ENCOUNTER FOR LONG-TERM (CURRENT) USE OF HIGH-RISK MEDICATION: ICD-10-CM

## 2019-04-22 DIAGNOSIS — M54.9 SPINE PAIN: ICD-10-CM

## 2019-04-22 RX ORDER — SENNOSIDES 8.6 MG/1
1 TABLET ORAL
Qty: 30 TAB | Refills: 2 | Status: SHIPPED | OUTPATIENT
Start: 2019-04-22 | End: 2019-11-27 | Stop reason: SDUPTHER

## 2019-04-22 RX ORDER — OXYCODONE HYDROCHLORIDE 10 MG/1
10 TABLET ORAL
Qty: 70 TAB | Refills: 0 | Status: SHIPPED | OUTPATIENT
Start: 2019-04-30 | End: 2019-05-23 | Stop reason: SDUPTHER

## 2019-04-22 RX ORDER — DOCUSATE SODIUM 100 MG/1
100 CAPSULE, LIQUID FILLED ORAL
Qty: 60 CAP | Refills: 2 | Status: SHIPPED | OUTPATIENT
Start: 2019-04-22 | End: 2019-11-27 | Stop reason: SDUPTHER

## 2019-04-22 RX ORDER — ACETAMINOPHEN 500 MG
TABLET ORAL
Qty: 200 TAB | Refills: 1 | Status: SHIPPED | OUTPATIENT
Start: 2019-04-22 | End: 2019-07-18 | Stop reason: SDUPTHER

## 2019-04-22 NOTE — PROGRESS NOTES
Referred by Worker's Compensation for low back pain and neck pain. Calculated MEQ - 45  Naloxone rescue - yes  Prophylactic bowel program - ordered  Date of last OCA 12/4/2018  Last UDS 1/21/2019, consistent    date checked today, findings consistent      Today   Last Visit  Prior Visit(s)  ORT -       [facility] -1 and 10 1 and 8  1 and 9  [facility] -76%  80%   82%  COMM -6  4   7    HPI:  Isai Holland is a 48 y.o. female here for f/u visit for ongoing evaluation of low back and neck pain. Pt was last seen here on 1/21/2019. Pt denies interval changes on the character or distribution of pain. Pain is located posterior neck bilateral trapezius, across lower back and right side of back with radiation down right lower extremity. The pain is described as aching, burning, numbness, pins-and-needles, stabbing. Pain at its best is 7/10. Pain at its worse is 10/10. The pain is worsened by any kind of movement or activity. Symptoms are improved by only opioid medications. Pt has tried naproxen, lidocaine patches with no perceived benefit. Pt has never tried implantable pain pump, spinal cord stimulator. Per Dr. Tommy Joseph note\"\"  --Home health referral was denied. - Referral to pain management placed last visit has not been executed. -Requested x-rays not obtained, states they were instructed to,\"not worry about them\" so they did not have it done.  -Home exercise program for piriformis stretching has not been done. - Topicals Biofreeze and Aspercreme are mildly helpful. -TENS unit has not been used due to need for electrodes. -Tylenol up to 3000 mg daily as needed for pain has not been taken.   -Pamelor 75 mg nightly helps with HA and sleep\"\"\"    Since last visit, reports she is unable to follow-up with Dr. Artem Davis for pain management as her Workmen's Comp. will not covered the  fee.     ROS:  Positive findings include bowel and bladder accidents chronic not new  Denies fever, chills, nausea, vomiting, diarrhea, constipation, abdominal pain, chest pain, shortness or breath/trouble breathing, weakness, trouble swallowing, changes in vision, changes in hearing, falls, dizziness,  depression, anxiety, suicidal ideations, homicidal ideations or alcohol use. Opioid specific risk: Subjective reports memory deficits, obesity, asthma, polypharmacy,, long-term opioid therapy, depression, TBI, falls. Vitals:    04/22/19 1355   BP: 124/84   Pulse: 76   Resp: 24   Temp: 97 °F (36.1 °C)   TempSrc: Oral   SpO2: 99%   Weight: 111.1 kg (245 lb)   Height: 5' 11\" (1.803 m)   PainSc:   8   PainLoc: Back        Imaging:  Ordered previously never obtained    Labs:   BUN/Cr: \"\" 7/0.6  On 10/29/2018\"\"  AST/ALT: \"\" 26/23  On 10/29/2018\"\"    Physical exam:  AFVSS, no acute distress, endomorphic body habitus. A&OXs 3. Normocephalic, atraumatic. Conjugate gaze, clear sclerae. EOM intact. Speech is clear and appropriate. Mood is appropriate and patient is cooperative. Antalgic gait with decreased frances with assistive right-handed mono cane for gait and balance. Non-labored breathing. Even rise of chest wall.       Primary Care Physician  Sahil Casey Alfonso Pedras [address] [phone]      [facility] -- .  3 most recent PHQ Screens 4/22/2019   Little interest or pleasure in doing things Not at all   Feeling down, depressed, irritable, or hopeless Not at all   Total Score PHQ 2 0   Trouble falling or staying asleep, or sleeping too much -   Feeling tired or having little energy -   Poor appetite, weight loss, or overeating -   Feeling bad about yourself - or that you are a failure or have let yourself or your family down -   Trouble concentrating on things such as school, work, reading, or watching TV -   Moving or speaking so slowly that other people could have noticed; or the opposite being so fidgety that others notice -   Thoughts of being better off dead, or hurting yourself in some way -   PHQ 9 Score -   How difficult have these problems made it for you to do your work, take care of your home and get along with others -       Assessment/Plan:     ICD-10-CM ICD-9-CM    1. DDD (degenerative disc disease), cervical M50.30 722.4 oxyCODONE IR (ROXICODONE) 10 mg tab immediate release tablet   2. Spine pain M54.9 724.5    3. Chronic pain syndrome G89.4 338.4 REFERRAL TO NEUROPSYCHOLOGY      oxyCODONE IR (ROXICODONE) 10 mg tab immediate release tablet      CANCELED: REFERRAL TO NEUROPSYCHOLOGY   4. Encounter for long-term (current) use of high-risk medication Z79.899 V58.69 senna (SENNA) 8.6 mg tablet      docusate sodium (COLACE) 100 mg capsule      --Reprinted pain management referral, neuropsych referral, home health aide and faxed over to [facility] patient's  for her Workmen's Comp. per patient's request to [phone]. Patient and her sister also given copies of the referral sent over. --Patient given handout of local pain management providers in the area which still providing opioid therapy    --Patient to continue regular follow-ups with PCP    --Patient given referral for neuropsych evaluation for reports of cognitive decline evaluate and treat    --Refilled Tylenol up to 1000mg TID as needed for pain. --Per Dr. Yessy Gilman note \"patient wishes to transfer her care to her previous provider Dr. Pantera Salmeron and I see no reason why she should be denied this continuity of care as she has a long-standing relationship with this provider. I recommend the transfer of care as the patient repeatedly reports dissatisfaction with our current model for pain care\" patient reports Workmen's Comp. will not cover the  fee by Dr. Beti Recio or even though Dr. Timmy Madera will see her as a patient. --If patient remains with our practice she will be referred for evaluation for implantable pain pump, patient is not interested in investigating the implantable pain pump further.      --Patient to continue regular follow-ups with neurology for her headaches    --Previous visit Dr. Larry Alcantar  referred patient for \"\"pain psychology for training in cognitive behavioral therapy, acceptance commitment therapy, biofeedback, mindfulness meditation and other modalities as indicated to help improve her resilience and self coping mechanisms so that she can better self manage her chronic pain. \"\"    --Previous visit Dr. Larry Alcantar referred patient for home health aide to \"\"assist with activities of daily living as patient has reported incidences where she has dropped things on the stove while attempting to cook leading to burns, patient has difficulty showering and bathing and needs assistance from family members, patient has difficulty with grooming and is unable to care for her home as well as a  2 and 1 shower chair has been requested\"\"    Do not recommend long term opioid therapy for this patient at this time for their chronic pain; the risks outweigh the potential benefits. Pt currently taking oxycodone IR 10 mg tablets 1 tablet up to 3 times daily with total of 70 tablets of budgeted over 30 days. Their MME is 39. Today, we will hold the weaning of patients opioid medication with a goal of being opioid free, pending safety and compliance. Pt instructed to call if they experience any signs of withdrawal (diarrhea, nausea, vomiting, sweating or chills, agitation, itching). Today, pt given prescription for oxycodone IR 10 mg tablets 1 tablet up to 3 times daily with total of 70 tablets of budgeted over 30 days. Their new MME is 39. At next office visit, the plan is to provide patient with oxycodone IR 10 mg dose 1 tablet up to 3 times daily as needed with a total of 65 tablets provided over 30 days. Their new MME will be 45. If patient has difficulty with the wean or difficulty with cravings we will consider referral to mental health for ongoing assessment and treatment for opioid use disorder.       Follow up ongoing assessment and ongoing development of integrative and comprehensive plan of care for chronic pain. Goals: To establish complementary and integrative plan of care to address chronic pain issues while minimizing pharmaceuticals to maximize patient's function improve quality of life. Education:  Patient again educated on the importance of strict compliance with the opioid care agreement while on opioid therapy. Patient also again educated that they should avoid driving while on chronic opioid therapy. Also advised to avoid alcohol and to avoid benzodiazepines while on opioid therapy. Handouts given regarding opioid safety and sleep health. Follow-up and Dispositions    · Return in about 3 months (around 7/22/2019). Over 20 minutes were spent with the patient of which more than half the time was spent counseling and/or coordinating care.     Social History     Socioeconomic History    Marital status:      Spouse name: Not on file    Number of children: Not on file    Years of education: Not on file    Highest education level: Not on file   Occupational History    Occupation: nurse assistant   Social Needs    Financial resource strain: Not on file    Food insecurity:     Worry: Not on file     Inability: Not on file    Transportation needs:     Medical: Not on file     Non-medical: Not on file   Tobacco Use    Smoking status: Former Smoker     Types: Cigarettes    Smokeless tobacco: Never Used    Tobacco comment: Smoke for 26 years   Substance and Sexual Activity    Alcohol use: No    Drug use: No    Sexual activity: Yes     Partners: Male     Birth control/protection: None   Lifestyle    Physical activity:     Days per week: Not on file     Minutes per session: Not on file    Stress: Not on file   Relationships    Social connections:     Talks on phone: Not on file     Gets together: Not on file     Attends Christian service: Not on file     Active member of club or organization: Not on file     Attends meetings of clubs or organizations: Not on file     Relationship status: Not on file    Intimate partner violence:     Fear of current or ex partner: Not on file     Emotionally abused: Not on file     Physically abused: Not on file     Forced sexual activity: Not on file   Other Topics Concern    Not on file   Social History Narrative    Not on file     Family History   Problem Relation Age of Onset    Heart Attack Mother     Heart Disease Mother     Diabetes Mother     Pacemaker Mother     Stroke Father     Cancer Father     Hypertension Maternal Grandmother     Heart Attack Maternal Grandmother     Diabetes Maternal Grandmother     Cancer Maternal Grandmother     Hypertension Maternal Grandfather     Diabetes Maternal Grandfather     Hypertension Paternal Grandmother     Diabetes Paternal Grandmother     Breast Cancer Paternal Grandmother     Hypertension Paternal Grandfather     Diabetes Paternal Grandfather     Heart Attack Sister     Breast Cancer Maternal Aunt     Cancer Maternal Aunt     Breast Cancer Paternal Aunt     Cancer Maternal Uncle     Cancer Niece      Allergies   Allergen Reactions    Latex Rash    Lisinopril Cough, Swelling and Shortness of Breath     Ace Inhibitor - cough     Past Medical History:   Diagnosis Date    Abnormal nuclear cardiac imaging test 07/10/2012    Mod mid to distal anterior septal ischemia. EF 47%. Mid to distal anterior septal hypk. Normal EKG portion of stress test.  Intermediate high risk.     Asthma     Back pain     injury at work June 2014    Coronary atherosclerosis of native coronary artery     angiographically normal coronaries with dLAD bridging (july 2012)    Diabetes mellitus type II, controlled ([facility] [address]     Forgetfulness     since injury June 2014    Fracture of left humerus     GERD (gastroesophageal reflux disease)     Gout     Headache(784.0)     Hypertension     Lack of coordination     since injury June 2014    Neck pain     injury at work June 2014    Obesity     S/P cardiac cath 07/10/2012    dLAD w/mild to mod bridging. Otherwise patent coronaries. At least mod LVH. EF 65%. Past Surgical History:   Procedure Laterality Date    HX APPENDECTOMY      HX GI      Gallbladder    HX HEART CATHETERIZATION  7/10/2012    HX ORTHOPAEDIC      left arm     Current Outpatient Medications on File Prior to Visit   Medication Sig    acetaminophen (TYLENOL) 500 mg tablet Up to 1000mg TID as needed for pain. Do not exceed 3000mg daily.  nortriptyline (PAMELOR) 25 mg capsule Take 1 Cap by mouth nightly. (Patient taking differently: Take 75 mg by mouth nightly.)    spironolactone (ALDACTONE) 25 mg tablet Take 1 Tab by mouth two (2) times a day.  cloNIDine HCl (CATAPRES) 0.1 mg tablet TAKE ONE TABLET BY MOUTH TWICE DAILY    carvedilol (COREG) 25 mg tablet Take 1 Tab by mouth two (2) times daily (with meals).  nortriptyline (PAMELOR) 50 mg capsule Take 1 Cap by mouth ACB/HS.  naproxen EC (EC-NAPROSYN) 375 mg TbEC Take 1 Tab by mouth two (2) times daily as needed.  TENS Units (TENS 504) gee Please provide patient with a TENS unit to use on her lumbosacral region and her cervicothoracic region for chronic pain issues. Indications: For chronic lumbar and cervical pain. Indications: Chronic Pain    tens unit electrodes (TENS UNITS ELECTRODES) 2X2 \" pads For chronic lumbar and cervical pain. Indications: Chronic Pain    metFORMIN (GLUCOPHAGE) 1,000 mg tablet Take 1 Tab by mouth two (2) times daily (with meals).  allopurinol (ZYLOPRIM) 100 mg tablet Take 1 Tab by mouth two (2) times a day.  naloxone 4 mg/actuation spry 4 mg by Nasal route as needed for up to 2 doses. Indications: OPIOID TOXICITY    oxybutynin (DITROPAN) 5 mg tablet Take 1 Tab by mouth two (2) times a day.     glucose blood VI test strips (RELION PRIME TEST STRIPS) strip Use as directed    albuterol (PROVENTIL HFA, VENTOLIN HFA, PROAIR HFA) 90 mcg/actuation inhaler Take 2 Puffs by inhalation every four (4) hours as needed for Wheezing.  OTHER Motorized Wheelchair    multivitamin (ONE A DAY) tablet Take 1 tablet by mouth daily. No current facility-administered medications on file prior to visit. [address] was used for portions of this report. Unintended errors may occur.

## 2019-04-22 NOTE — PROGRESS NOTES
Nursing Notes    Patient presents to the office today in follow-up. Patient rates her pain at 8 6 /10 on the numerical pain scale. Reviewed medications with counts as follows:    Rx Date filled Qty Dispensed Pill Count Last Dose Short   Oxycodone 10mg 04/01/19 70 6 Today  Patient given #70 tabs to last 30 days                                        reviewed YES  Any aberrancies noted on  NO  Last opioid agreement 12/2018  Last urine drug screen 01/2019    Comments:     POC UDS was not performed in office today    Any new labs or imaging since last appointment? NO    Have you been to an emergency room (ER) or urgent care clinic since your last visit? NO            Have you been hospitalized since your last visit? NO     If yes, where, when, and reason for visit? Have you seen or consulted any other health care providers outside of the [address] [place] [address]  since your last visit? NO     If yes, where, when, and reason for visit? Ms. Lucas Poole has a reminder for a \"due or due soon\" health maintenance. I have asked that she contact her primary care provider for follow-up on this health maintenance.

## 2019-05-21 ENCOUNTER — TELEPHONE (OUTPATIENT)
Dept: PAIN MANAGEMENT | Age: 54
End: 2019-05-21

## 2019-05-21 NOTE — TELEPHONE ENCOUNTER
Name:VERIFIED  Name of medication:OXYCODONE  Phone #:[phone]  % of pain relief:25%  Daily Task (ex: personal hygiene, cleaning, cooking):NO, THE PATIENT HAS SOMEONE THAT HELPS HER FOR EVERYTHING  Side effects:NO

## 2019-05-22 DIAGNOSIS — G89.4 CHRONIC PAIN SYNDROME: ICD-10-CM

## 2019-05-22 DIAGNOSIS — M50.30 DDD (DEGENERATIVE DISC DISEASE), CERVICAL: ICD-10-CM

## 2019-05-23 NOTE — TELEPHONE ENCOUNTER
Maria Gmanish Rendon has called requesting a refill of their controlled medication, Roxicodone 10 mg tab, for the management of chronic pain. Last office visit date: 4/22/19 with Gladis, and has a f/u appt on 7/18/19. Last opioid care agreement 12/3/18  Last UDS was done 1/21/19    Date last  was pulled and reviewed : 5/23/19  Last fill date for medication was 4/30/19    Was the patient compliant when the above report was pulled? yes    Analgesia:Per Ron, Patient reports 25% pain relief on current regimen. Aberrancies: No aberrancies noted in the last 30 days. ADL's:Per Ron,Patient states they not are able to perform ADL's on current regimen, and requires assistance from others. Adverse Reaction:Per Ron, Patient reports no adverse reactions at this time. Provider's last note and plan of care reviewed? yes  Request forwarded to provider for review.

## 2019-05-24 RX ORDER — OXYCODONE HYDROCHLORIDE 10 MG/1
10 TABLET ORAL
Qty: 70 TAB | Refills: 0 | Status: SHIPPED | OUTPATIENT
Start: 2019-05-29 | End: 2019-06-28

## 2019-05-24 NOTE — TELEPHONE ENCOUNTER
11:53 am I LM with EMELIA OBRIEN - EVERYTHING, to let her know that her Rx for Oxycodone is ready for  no later that 03:45 pm

## 2019-06-17 ENCOUNTER — OFFICE VISIT (OUTPATIENT)
Dept: NEUROLOGY | Age: 54
End: 2019-06-17

## 2019-06-17 VITALS
OXYGEN SATURATION: 97 % | SYSTOLIC BLOOD PRESSURE: 156 MMHG | RESPIRATION RATE: 22 BRPM | BODY MASS INDEX: 33.99 KG/M2 | TEMPERATURE: 98.9 F | WEIGHT: 242.8 LBS | HEART RATE: 91 BPM | DIASTOLIC BLOOD PRESSURE: 110 MMHG | HEIGHT: 71 IN

## 2019-06-17 DIAGNOSIS — G44.309 POST-CONCUSSION HEADACHE: ICD-10-CM

## 2019-06-17 DIAGNOSIS — G44.329 CHRONIC POST-TRAUMATIC HEADACHE, NOT INTRACTABLE: ICD-10-CM

## 2019-06-17 RX ORDER — NORTRIPTYLINE HYDROCHLORIDE 75 MG/1
75 CAPSULE ORAL
Qty: 90 CAP | Refills: 3 | Status: SHIPPED | OUTPATIENT
Start: 2019-06-17

## 2019-06-17 RX ORDER — NORTRIPTYLINE HYDROCHLORIDE 50 MG/1
50 CAPSULE ORAL DAILY
Qty: 90 CAP | Refills: 3 | Status: SHIPPED | OUTPATIENT
Start: 2019-06-17

## 2019-06-17 NOTE — PROGRESS NOTES
Re:  Michelle Rangel,Follow up visit     6/17/2019 4:12 PM    SSN: [id number]    Subjective:   Jayro Brody returns today. She's been treated by the pain clinic for the 2 years with only marginal improvement, if anything she is getting worse. She's being taken off of her narcotics. She still has a lot of pain in the back and down the right leg. He has been falling because of weakness of the right leg. She complains of terrible headaches. She has daily headaches, the medications do not seem to be working at all. She's complaining about lose of her doctors over at pain management. She's complaining about not getting her medications at the pain clinic. She's supposed to be seeing the new doctors at the pain clinic. She's going to try to get a hold of Dr Jonas Simmons, but he's  and the insurance compant won't let her see him. Medications:    Current Outpatient Medications   Medication Sig Dispense Refill    oxyCODONE IR (ROXICODONE) 10 mg tab immediate release tablet Take 1 Tab by mouth three (3) times daily as needed for Pain (#70 tabs to be budgeted over 30 days.) for up to 30 days. Max Daily Amount: 30 mg. Indications: Pain 70 Tab 0    senna (SENNA) 8.6 mg tablet Take 1 Tab by mouth daily as needed for Constipation. 30 Tab 2    docusate sodium (COLACE) 100 mg capsule Take 1 Cap by mouth two (2) times daily as needed for Constipation. 60 Cap 2    acetaminophen (TYLENOL) 500 mg tablet Up to 1000mg TID as needed for pain. Do not exceed 3000mg daily. 200 Tab 1    nortriptyline (PAMELOR) 25 mg capsule Take 1 Cap by mouth nightly. (Patient taking differently: Take 75 mg by mouth nightly.) 30 Cap 5    spironolactone (ALDACTONE) 25 mg tablet Take 1 Tab by mouth two (2) times a day. 60 Tab 1    cloNIDine HCl (CATAPRES) 0.1 mg tablet TAKE ONE TABLET BY MOUTH TWICE DAILY 60 Tab 1    carvedilol (COREG) 25 mg tablet Take 1 Tab by mouth two (2) times daily (with meals).  60 Tab 1    TENS Units (TENS 504) gee Please provide patient with a TENS unit to use on her lumbosacral region and her cervicothoracic region for chronic pain issues. Indications: For chronic lumbar and cervical pain. Indications: Chronic Pain 1 Device 0    metFORMIN (GLUCOPHAGE) 1,000 mg tablet Take 1 Tab by mouth two (2) times daily (with meals). 180 Tab 1    allopurinol (ZYLOPRIM) 100 mg tablet Take 1 Tab by mouth two (2) times a day. 180 Tab 1    naloxone 4 mg/actuation spry 4 mg by Nasal route as needed for up to 2 doses. Indications: OPIOID TOXICITY 1 Box 1    oxybutynin (DITROPAN) 5 mg tablet Take 1 Tab by mouth two (2) times a day. 60 Tab 3    albuterol (PROVENTIL HFA, VENTOLIN HFA, PROAIR HFA) 90 mcg/actuation inhaler Take 2 Puffs by inhalation every four (4) hours as needed for Wheezing. 1 Inhaler 0    multivitamin (ONE A DAY) tablet Take 1 tablet by mouth daily.  nortriptyline (PAMELOR) 50 mg capsule Take 1 Cap by mouth ACB/HS. 60 Cap 5    naproxen EC (EC-NAPROSYN) 375 mg TbEC Take 1 Tab by mouth two (2) times daily as needed. 60 Tab 1    tens unit electrodes (TENS UNITS ELECTRODES) 2X2 \" pads For chronic lumbar and cervical pain. Indications: Chronic Pain 1 Each 0    glucose blood VI test strips (RELION PRIME TEST STRIPS) strip Use as directed 50 Strip 2    OTHER Motorized Wheelchair 1 Device prn       Vital signs:    Visit Vitals  BP (!) 156/110 (BP 1 Location: Left arm, BP Patient Position: Sitting)   Pulse 91   Temp 98.9 °F (37.2 °C) (Oral)   Resp 22   Ht 5' 11\" (1.803 m)   Wt 110.1 kg (242 lb 12.8 oz)   LMP 06/06/2019 (Exact Date)   SpO2 97%   BMI 33.86 kg/m²       Review of Systems: does get some dizziness. Has some pain in the left ear.   As above otherwise 11 point review of systems negative including;   Constitutional no fever or chills  Skin denies rash or itching  HEENT  Denies tinnitus, hearing lose  Eyes denies diplopia vision lose  Respiratory denies sortness of breath  Cardiovascular denies chest pain, dyspnea on exertion  Gastrointestinal denies nausea, vomiting, diarrhea, constipation  Genitourinary denies incontinence  Musculoskeletal denies joint pain or swelling  Endocrine denies weight change  Hematology denies easy bruising or bleeding   Neurological as above in HPI      Patient Active Problem List   Diagnosis Code    Hypertension I10    Diabetes mellitus type II, controlled ([facility] [address] E11.9    Coronary atherosclerosis of native coronary artery(aka CAD) I25.10    Headache R51    GERD (gastroesophageal reflux disease) K21.9    Gout M10.9    Endometriosis N80.9    Abnormal Pap smear [id number]    Contusion T14. 8XXA    Post-concussion headache G44.309    Chronic pain G89.29    Back pain M54.9    Multiple contusions T07. Galileo Gigi    Fall W19. Galileo Gigi    Chest pain R07.9    Neck pain M54.2    Disturbance of skin sensation R20.9    Carpal tunnel syndrome G56.00    Bilateral carpal tunnel syndrome G56.03    Cervicalgia M54.2    Brachial neuritis M54.12    Degeneration of cervical intervertebral disc M50.30    Degeneration of lumbar or lumbosacral intervertebral disc M51.37    Encounter for long-term opiate analgesic use Z79.891    Chronic pain syndrome G89.4    Neuritis of lower extremity G57.90    Chronic midline low back pain M54.5, G89.29         Objective: The patient is awake, alert, and oriented x 3, knows its March 1, not sure of the day of the week.  Speech is dysfluent, slow and memory is intact.  She is psychomotor slow. Cranial Nerves: II - Visual fields are full to confrontation. III, IV, VI - Extraocular movements are intact. There is no nystagmus. V - Facial sensation is intact to pinprick. VII - Face is symmetrical.  VIII - Hearing is present. IX, X, XII - Palate is symmetrical.   XI - Shoulder shrugging and head turning intact  Motor: The patient has generalized weakness of both legs, worse on the right than the left. She's no better than 2+/5 on the right.  Tone is normal. Reflexes are 2+ and symmetrical. Plantars are down going. Gait is abnormal, she limps off of the right leg, uses a cane in the right hand. CBC:   Lab Results   Component Value Date/Time    WBC 6.7 03/21/2019 01:34 PM    RBC 5.41 (H) 03/21/2019 01:34 PM    HGB 13.2 03/21/2019 01:34 PM    HCT 39.7 03/21/2019 01:34 PM    PLATELET 787 32/28/8822 01:34 PM     BMP:   Lab Results   Component Value Date/Time    Glucose 82 03/21/2019 01:34 PM    Sodium 140 03/21/2019 01:34 PM    Potassium 4.6 03/21/2019 01:34 PM    Chloride 106 03/21/2019 01:34 PM    CO2 29 03/21/2019 01:34 PM    BUN 13 03/21/2019 01:34 PM    Creatinine 0.89 03/21/2019 01:34 PM    Calcium 9.4 03/21/2019 01:34 PM     CMP:   Lab Results   Component Value Date/Time    Glucose 82 03/21/2019 01:34 PM    Sodium 140 03/21/2019 01:34 PM    Potassium 4.6 03/21/2019 01:34 PM    Chloride 106 03/21/2019 01:34 PM    CO2 29 03/21/2019 01:34 PM    BUN 13 03/21/2019 01:34 PM    Creatinine 0.89 03/21/2019 01:34 PM    Calcium 9.4 03/21/2019 01:34 PM    Anion gap 5 03/21/2019 01:34 PM    BUN/Creatinine ratio 15 03/21/2019 01:34 PM    Alk. phosphatase 64 03/21/2019 01:34 PM    Protein, total 7.5 03/21/2019 01:34 PM    Albumin 4.3 03/21/2019 01:34 PM    Globulin 3.2 03/21/2019 01:34 PM    A-G Ratio 1.3 03/21/2019 01:34 PM     Coagulation:   Lab Results   Component Value Date/Time    Prothrombin time 12.6 07/10/2012 12:18 PM    INR 1.0 07/10/2012 12:18 PM    aPTT 29.4 07/10/2012 12:18 PM     Cardiac markers:   Lab Results   Component Value Date/Time    CK 63 07/03/2016 09:59 PM    CK-MB Index 1.6 07/03/2016 09:59 PM       Assessment:  Chronic pain syndrome after trauma, etiology uncertain. Continued weakness of the right leg, no clearcut etiology established. Slow and slurred speech with no other cognitive deficits, unclear etiology. Plan: Will renew her medications I'm providing, which is only the Pamelor 50 mg in the morning and 75 in the evening for now. I really have very little to offer her at this time in reference to therapy and hope pain management can help her. She seems to have some unclear pain syndrome, possibly fibromyalgia. Will see back here in 12 months. Sincerely,        Jaycee Her.  Kerry Landis M.D.

## 2019-06-18 DIAGNOSIS — G89.4 CHRONIC PAIN SYNDROME: ICD-10-CM

## 2019-06-18 DIAGNOSIS — M50.30 DDD (DEGENERATIVE DISC DISEASE), CERVICAL: ICD-10-CM

## 2019-06-18 NOTE — TELEPHONE ENCOUNTER
Name:VERIFIED  Name of medication:OXYCODONE  Phone #[phone]  % of pain relief:15% (I CONFIRMED IT TWICE).    Daily Task (ex: personal hygiene, cleaning, cooking):NO, SHE GET ASSISTANCE TO EVERYTHING  Side effects:NO

## 2019-06-19 RX ORDER — OXYCODONE HYDROCHLORIDE 15 MG/1
15 TABLET ORAL 3 TIMES DAILY
Qty: 70 TAB | Refills: 0 | Status: SHIPPED | OUTPATIENT
Start: 2019-06-28 | End: 2019-07-28

## 2019-06-19 NOTE — TELEPHONE ENCOUNTER
Apoorva Farooq has called requesting a refill of their controlled medication, oxycodone, for the management of DDD. Last office visit date: 4/22/19 with Caroline Christian, next 7/18/19 with Caroline Christian  Last opioid care agreement 12/4/18  Last UDS was done 1/23/19    Date last  was pulled and reviewed : 6/19/19  Last fill date for medication was 5/29/19    Was the patient compliant when the above report was pulled? yes    Analgesia: 15%    Aberrancies: none    ADL's: no, she get assistance to everything    Adverse Reaction: no    Provider's last note and plan of care reviewed? yes  Request forwarded to provider for review.

## 2019-06-19 NOTE — TELEPHONE ENCOUNTER
Patient identified using two patient identifiers (name and ); patient advised prescriptions are ready for pick-up. Patient also informed  hours are Mon-Fri [id number]. Patient verbalized understanding. Patient reports she is sending her son, Marilynn Hung, to  prescription, [facility]  Shows Marilynn Hung on list for everything.

## 2019-07-18 ENCOUNTER — OFFICE VISIT (OUTPATIENT)
Dept: PAIN MANAGEMENT | Age: 54
End: 2019-07-18

## 2019-07-18 VITALS
HEART RATE: 82 BPM | DIASTOLIC BLOOD PRESSURE: 90 MMHG | BODY MASS INDEX: 33.88 KG/M2 | RESPIRATION RATE: 16 BRPM | HEIGHT: 71 IN | OXYGEN SATURATION: 97 % | TEMPERATURE: 97.2 F | WEIGHT: 242 LBS | SYSTOLIC BLOOD PRESSURE: 125 MMHG

## 2019-07-18 DIAGNOSIS — M62.838 MUSCLE SPASM: ICD-10-CM

## 2019-07-18 DIAGNOSIS — M50.30 DDD (DEGENERATIVE DISC DISEASE), CERVICAL: Primary | ICD-10-CM

## 2019-07-18 DIAGNOSIS — Z79.899 ENCOUNTER FOR LONG-TERM (CURRENT) USE OF HIGH-RISK MEDICATION: ICD-10-CM

## 2019-07-18 DIAGNOSIS — G89.4 CHRONIC PAIN SYNDROME: ICD-10-CM

## 2019-07-18 DIAGNOSIS — M54.9 SPINE PAIN: ICD-10-CM

## 2019-07-18 RX ORDER — NAPROXEN 375 MG/1
375 TABLET, DELAYED RELEASE ORAL
Qty: 60 TAB | Refills: 2 | Status: SHIPPED | OUTPATIENT
Start: 2019-07-18 | End: 2019-10-17 | Stop reason: SDUPTHER

## 2019-07-18 RX ORDER — OXYCODONE HYDROCHLORIDE 10 MG/1
10 TABLET ORAL
Qty: 70 TAB | Refills: 0 | Status: SHIPPED | OUTPATIENT
Start: 2019-07-27 | End: 2019-08-22 | Stop reason: SDUPTHER

## 2019-07-18 RX ORDER — ACETAMINOPHEN 500 MG
TABLET ORAL
Qty: 200 TAB | Refills: 2 | Status: SHIPPED | OUTPATIENT
Start: 2019-07-18 | End: 2019-10-17 | Stop reason: SDUPTHER

## 2019-07-18 RX ORDER — CYCLOBENZAPRINE HCL 5 MG
5 TABLET ORAL
Qty: 90 TAB | Refills: 2 | Status: SHIPPED | OUTPATIENT
Start: 2019-07-18 | End: 2019-10-17 | Stop reason: SDUPTHER

## 2019-07-18 NOTE — PROGRESS NOTES
Referred by Worker's Compensation for low back pain and neck pain. Calculated MEQ - 67.5  Naloxone rescue - yes  Prophylactic bowel program - ordered  Date of last OCA 12/3/2018  Last UDS 07/18/19, consistent POC, awaiting confirmatory testing   date checked today, findings consistent      Today   Last Visit  Prior Visit(s)  ORT -       [facility] - 1 and 8  1 and 10 1 and 8   [facility] -76%  80%     COMM -6  4       HPI:  Amelia Sinclair is a 48 y.o. female here for f/u visit for ongoing evaluation of low back and neck pain. Pt was last seen here on 04/22/2019. Pt denies interval changes on the character or distribution of pain. Pain is located posterior neck bilateral trapezius, across lower back and right side of back with radiation down right lower extremity. The pain is described as throbbing,aching, burning, pins-and-needles, stabbing. Pain at its best is 8/10. Pain at its worse is 10/10. The pain is worsened by any kind of movement or activity. Symptoms are improved by only opioid medications. Pt has tried naproxen, lidocaine patches with no perceived benefit. Pt has never tried implantable pain pump, spinal cord stimulator. Occupational Therapy referral has not been obtained, pain psychology referral has not been completed, neuropsych eval has not been completed. Per Dr. Gene Sosa note\"\"  --Home health referral was denied. - Referral to pain management placed last visit has not been executed. -Requested x-rays not obtained, states they were instructed to,\"not worry about them\" so they did not have it done.  -Home exercise program for piriformis stretching has not been done. - Topicals Biofreeze and Aspercreme are mildly helpful.   -Pamelor 75 mg nightly helps with HA and sleep\"\"\"    Since last visit,Patient reports she has been unable to have follow up with pscyology due to [employer] and she is working with her  to try and have the follow-up with neuropsych.     ROS:  Positive findings include bowel and bladder accidents chronic not new  Denies fever, chills, nausea, vomiting, diarrhea, constipation, abdominal pain, chest pain, shortness or breath/trouble breathing, weakness, trouble swallowing, changes in vision, changes in hearing, falls, dizziness,  depression, anxiety, suicidal ideations, homicidal ideations or alcohol use. Opioid specific risk: Subjective reports memory deficits, obesity, asthma, polypharmacy, long-term opioid therapy, depression, TBI, falls. Vitals:    07/18/19 1326   BP: 125/90   Pulse: 82   Resp: 16   Temp: 97.2 °F (36.2 °C)   TempSrc: Oral   SpO2: 97%   Weight: 109.8 kg (242 lb)   Height: 5' 11\" (1.803 m)   PainSc:   8   LMP: 07/08/2019        Physical exam:  AFVS elevated diastolic, patient asymptomatic, no acute distress, endomorphic body habitus. A&OXs 3. Normocephalic, atraumatic. Conjugate gaze, clear sclerae. EOM intact. Bilateral rounded shoulders with forward head posture. Speech is clear and appropriate. Mood is appropriate and patient is cooperative. Antalgic gait with decreased frances with assistive right-handed mono cane for gait and balance. Non-labored breathing. Even rise of chest wall. Right SIJ and right piriformis TTP.       Primary Care Physician  Erendira Hardin  [address] [place] [address] -- .  3 most recent PHQ Screens 7/18/2019   Little interest or pleasure in doing things Nearly every day   Feeling down, depressed, irritable, or hopeless Not at all   Total Score PHQ 2 3   Trouble falling or staying asleep, or sleeping too much Nearly every day   Feeling tired or having little energy Several days   Poor appetite, weight loss, or overeating Not at all   Feeling bad about yourself - or that you are a failure or have let yourself or your family down Not at all   Trouble concentrating on things such as school, work, reading, or watching TV Several days   Moving or speaking so slowly that other people could have noticed; or the opposite being so fidgety that others notice Not at all   Thoughts of being better off dead, or hurting yourself in some way Not at all   PHQ 9 Score 8   How difficult have these problems made it for you to do your work, take care of your home and get along with others Very difficult       Assessment/Plan:     ICD-10-CM ICD-9-CM    1. DDD (degenerative disc disease), cervical M50.30 722.4 acetaminophen (TYLENOL) 500 mg tablet      oxyCODONE IR (ROXICODONE) 10 mg tab immediate release tablet   2. Spine pain M54.9 724.5 acetaminophen (TYLENOL) 500 mg tablet   3. Chronic pain syndrome G89.4 338.4 naproxen EC (EC-NAPROSYN) 375 mg TbEC      acetaminophen (TYLENOL) 500 mg tablet      oxyCODONE IR (ROXICODONE) 10 mg tab immediate release tablet   4. Muscle spasm M62.838 728.85 cyclobenzaprine (FLEXERIL) 5 mg tablet   5. Encounter for long-term (current) use of high-risk medication Z79.899 V58.69 DRUG SCREEN      AMB POC DRUG SCREEN ()      --UDS today    --Patient given printed prescription for occupational therapy and in-home evaluation that was ordered previously and has not been obtained. --Patient given handout of local pain management providers in the area which still providing opioid therapy    --Patient request referral outside of clinic for interventional injections    --Patient to continue regular follow-ups with PCP regarding elevated blood pressure and positive findings on review of systems. --Patient to continue TENS unit as needed     --Ordered BREGG back brace #627, to improve function and improve quality life    --Refill flexeril 5 mg 1 tab up TID as needed     --Ordered H wave to be used for home use no substitute. --Patient working with her  and Work[employer]. to obtain neuropsych evaluation for reports of cognitive decline which was ordered previously. --Refilled Tylenol up to 1000mg TID as needed for pain.     --Refilled naproxen 375 mg BID as needed     --Patient was previously requesting to transfer her care to her previous provider Dr. Curtis Allan or but reports she is unable to pay the  fee to see her previous pain management doctor. --If patient remains with our practice she will be referred for evaluation for implantable pain pump, patient declines this option at this time. --Patient to continue regular follow-ups with neurology for her headaches, last visit was lat month. Do not recommend long term opioid therapy for this patient at this time for their chronic pain; the risks outweigh the potential benefits. Pt currently taking oxycodone IR 15 mg tablets 1 tablet up to 3 times daily with total of 70 tablets of budgeted over 30 days. Their MME is 67.5. Today, we will hold the weaning of patients opioid medication with a goal of being opioid free, pending safety and compliance. Pt instructed to call if they experience any signs of withdrawal (diarrhea, nausea, vomiting, sweating or chills, agitation, itching). Today, pt given prescription for oxycodone IR 10 mg tablets 1 tablet up to 3 times daily with total of 70 tablets of budgeted over 30 days. Their new MME is 39. At next office visit, the plan is to provide patient with oxycodone IR 10 mg dose 1 tablet up to 3 times daily as needed with a total of 65 tablets provided over 30 days. Their new MME will be 45. If patient has difficulty with the wean or difficulty with cravings we will consider referral to mental health for ongoing assessment and treatment for opioid use disorder. Follow up ongoing assessment and ongoing development of integrative and comprehensive plan of care for chronic pain. Goals: To establish complementary and integrative plan of care to address chronic pain issues while minimizing pharmaceuticals to maximize patient's function improve quality of life.     Education:  Patient again educated on the importance of strict compliance with the opioid care agreement while on opioid therapy. Patient also again educated that they should avoid driving while on chronic opioid therapy. Also advised to avoid alcohol and to avoid benzodiazepines while on opioid therapy. Handouts given regarding opioid safety and sleep health. Follow-up and Dispositions    · Return in about 3 months (around 10/18/2019). Over 20 minutes were spent with the patient of which more than half the time was spent counseling and/or coordinating care.     Social History     Socioeconomic History    Marital status:      Spouse name: Not on file    Number of children: Not on file    Years of education: Not on file    Highest education level: Not on file   Occupational History    Occupation: nurse assistant   Social Needs    Financial resource strain: Not on file    Food insecurity:     Worry: Not on file     Inability: Not on file    Transportation needs:     Medical: Not on file     Non-medical: Not on file   Tobacco Use    Smoking status: Former Smoker     Types: Cigarettes    Smokeless tobacco: Never Used    Tobacco comment: Smoke for 26 years   Substance and Sexual Activity    Alcohol use: No    Drug use: No    Sexual activity: Yes     Partners: Male     Birth control/protection: None   Lifestyle    Physical activity:     Days per week: Not on file     Minutes per session: Not on file    Stress: Not on file   Relationships    Social connections:     Talks on phone: Not on file     Gets together: Not on file     Attends Rastafari service: Not on file     Active member of club or organization: Not on file     Attends meetings of clubs or organizations: Not on file     Relationship status: Not on file    Intimate partner violence:     Fear of current or ex partner: Not on file     Emotionally abused: Not on file     Physically abused: Not on file     Forced sexual activity: Not on file   Other Topics Concern    Not on file   Social History Narrative    Not on file     Family History   Problem Relation Age of Onset    Heart Attack Mother     Heart Disease Mother     Diabetes Mother     Pacemaker Mother     Stroke Father     Cancer Father     Hypertension Maternal Grandmother     Heart Attack Maternal Grandmother     Diabetes Maternal Grandmother     Cancer Maternal Grandmother     Hypertension Maternal Grandfather     Diabetes Maternal Grandfather     Hypertension Paternal Grandmother     Diabetes Paternal Grandmother     Breast Cancer Paternal Grandmother     Hypertension Paternal Grandfather     Diabetes Paternal Grandfather     Heart Attack Sister     Breast Cancer Maternal Aunt     Cancer Maternal Aunt     Breast Cancer Paternal Aunt     Cancer Maternal Uncle     Cancer Niece      Allergies   Allergen Reactions    Latex Rash    Lisinopril Cough, Swelling and Shortness of Breath     Ace Inhibitor - cough     Past Medical History:   Diagnosis Date    Abnormal nuclear cardiac imaging test 07/10/2012    Mod mid to distal anterior septal ischemia. EF 47%. Mid to distal anterior septal hypk. Normal EKG portion of stress test.  Intermediate high risk.  Asthma     Back pain     injury at work June 2014    Coronary atherosclerosis of native coronary artery     angiographically normal coronaries with dLAD bridging (july 2012)    Diabetes mellitus type II, controlled ([address]     Forgetfulness     since injury June 2014    Fracture of left humerus     GERD (gastroesophageal reflux disease)     Gout     Headache(784.0)     Hypertension     Lack of coordination     since injury June 2014    Neck pain     injury at work June 2014    Obesity     S/P cardiac cath 07/10/2012    dLAD w/mild to mod bridging. Otherwise patent coronaries. At least mod LVH. EF 65%.        Past Surgical History:   Procedure Laterality Date    HX APPENDECTOMY      HX GI      Gallbladder    HX HEART CATHETERIZATION  7/10/2012    HX ORTHOPAEDIC      left arm Current Outpatient Medications on File Prior to Visit   Medication Sig    oxyCODONE IR (OXY-IR) 15 mg immediate release tablet Take 1 Tab by mouth three (3) times daily for 30 days. Max Daily Amount: 45 mg. Indications: to be budgeted over 30 days    nortriptyline (PAMELOR) 50 mg capsule Take 1 Cap by mouth daily.  nortriptyline (PAMELOR) 75 mg capsule Take 1 Cap by mouth nightly.  senna (SENNA) 8.6 mg tablet Take 1 Tab by mouth daily as needed for Constipation.  docusate sodium (COLACE) 100 mg capsule Take 1 Cap by mouth two (2) times daily as needed for Constipation.  spironolactone (ALDACTONE) 25 mg tablet Take 1 Tab by mouth two (2) times a day.  cloNIDine HCl (CATAPRES) 0.1 mg tablet TAKE ONE TABLET BY MOUTH TWICE DAILY    carvedilol (COREG) 25 mg tablet Take 1 Tab by mouth two (2) times daily (with meals).  TENS Units (TENS 504) gee Please provide patient with a TENS unit to use on her lumbosacral region and her cervicothoracic region for chronic pain issues. Indications: For chronic lumbar and cervical pain. Indications: Chronic Pain    tens unit electrodes (TENS UNITS ELECTRODES) 2X2 \" pads For chronic lumbar and cervical pain. Indications: Chronic Pain    metFORMIN (GLUCOPHAGE) 1,000 mg tablet Take 1 Tab by mouth two (2) times daily (with meals).  allopurinol (ZYLOPRIM) 100 mg tablet Take 1 Tab by mouth two (2) times a day.  oxybutynin (DITROPAN) 5 mg tablet Take 1 Tab by mouth two (2) times a day.  glucose blood VI test strips (RELION PRIME TEST STRIPS) strip Use as directed    albuterol (PROVENTIL HFA, VENTOLIN HFA, PROAIR HFA) 90 mcg/actuation inhaler Take 2 Puffs by inhalation every four (4) hours as needed for Wheezing.  OTHER Motorized Wheelchair    multivitamin (ONE A DAY) tablet Take 1 tablet by mouth daily.  naloxone 4 mg/actuation spry 4 mg by Nasal route as needed for up to 2 doses.  Indications: OPIOID TOXICITY     No current facility-administered medications on file prior to visit. [address] was used for portions of this report. Unintended errors may occur.

## 2019-07-18 NOTE — PROGRESS NOTES
Nursing Notes    Patient presents to the office today in follow-up. Patient rates her pain at 8/10 on the numerical pain scale. Reviewed medications with counts as follows:    Rx Date filled Qty Dispensed Pill Count Last Dose Short   Oxycodone 15 mg  06/28/19 70 8 today                                        reviewed YES  Any aberrancies noted on  NO  Last opioid agreement 12/13/18  Last urine drug screen 01/02/19- due today    Comments:     POC UDS was performed in office today. Any new labs or imaging since last appointment? NO    Have you been to an emergency room (ER) or urgent care clinic since your last visit? NO            Have you been hospitalized since your last visit? NO     If yes, where, when, and reason for visit? Have you seen or consulted any other health care providers outside of the [address] [place] [address]  since your last visit? NO     If yes, where, when, and reason for visit? Ms. Rere Hayes has a reminder for a \"due or due soon\" health maintenance. I have asked that she contact her primary care provider for follow-up on this health maintenance.     3 most recent PHQ Screens 7/18/2019   Little interest or pleasure in doing things Nearly every day   Feeling down, depressed, irritable, or hopeless Not at all   Total Score PHQ 2 3   Trouble falling or staying asleep, or sleeping too much Nearly every day   Feeling tired or having little energy Several days   Poor appetite, weight loss, or overeating Not at all   Feeling bad about yourself - or that you are a failure or have let yourself or your family down Not at all   Trouble concentrating on things such as school, work, reading, or watching TV Several days   Moving or speaking so slowly that other people could have noticed; or the opposite being so fidgety that others notice Not at all   Thoughts of being better off dead, or hurting yourself in some way Not at all   PHQ 9 Score 8   How difficult have these problems made it for you to do your work, take care of your home and get along with others Very difficult     Pt reports that she is still trying to get into psychology because she is waiting for workers' comp. Provider made aware of the above score. Abuse Screening Questionnaire 7/18/2019   Do you ever feel afraid of your partner? N   Are you in a relationship with someone who physically or mentally threatens you? N   Is it safe for you to go home? Y     The pt does not have an advanced medical directive, POA, or living will.

## 2019-07-18 NOTE — PATIENT INSTRUCTIONS
Learning About Sleeping Well  What does sleeping well mean? Sleeping well means getting enough sleep. How much sleep is enough varies among people. The number of hours you sleep is not as important as how you feel when you wake up. If you do not feel refreshed, you probably need more sleep. Another sign of not getting enough sleep is feeling tired during the day. The average total nightly sleep time is 7½ to 8 hours. Healthy adults may need a little more or a little less than this. Why is getting enough sleep important? Getting enough quality sleep is a basic part of good health. When your sleep suffers, your mood and your thoughts can suffer too. You may find yourself feeling more grumpy or stressed. Not getting enough sleep also can lead to serious problems, including injury, accidents, anxiety, and depression. What might cause poor sleeping? Many things can cause sleep problems, including:  · Stress. Stress can be caused by fear about a single event, such as giving a speech. Or you may have ongoing stress, such as worry about work or school. · Depression, anxiety, and other mental or emotional conditions. · Changes in your sleep habits or surroundings. This includes changes that happen where you sleep, such as noise, light, or sleeping in a different bed. It also includes changes in your sleep pattern, such as having jet lag or working a late shift. · Health problems, such as pain, breathing problems, and restless legs syndrome. · Lack of regular exercise. How can you help yourself? Here are some tips that may help you sleep more soundly and wake up feeling more refreshed. Your sleeping area  · Use your bedroom only for sleeping and sex. A bit of light reading may help you fall asleep. But if it doesn't, do your reading elsewhere in the house. Don't watch TV in bed. · Be sure your bed is big enough to stretch out comfortably, especially if you have a sleep partner.   · Keep your bedroom quiet, dark, and cool. Use curtains, blinds, or a sleep mask to block out light. To block out noise, use earplugs, soothing music, or a \"white noise\" machine. Your evening and bedtime routine  · Create a relaxing bedtime routine. You might want to take a warm shower or bath, listen to soothing music, or drink a cup of noncaffeinated tea. · Go to bed at the same time every night. And get up at the same time every morning, even if you feel tired. What to avoid  · Limit caffeine (coffee, tea, caffeinated sodas) during the day, and don't have any for at least 4 to 6 hours before bedtime. · Don't drink alcohol before bedtime. Alcohol can cause you to wake up more often during the night. · Don't smoke or use tobacco, especially in the evening. Nicotine can keep you awake. · Don't take naps during the day, especially close to bedtime. · Don't lie in bed awake for too long. If you can't fall asleep, or if you wake up in the middle of the night and can't get back to sleep within 15 minutes or so, get out of bed and go to another room until you feel sleepy. · Don't take medicine right before bed that may keep you awake or make you feel hyper or energized. Your doctor can tell you if your medicine may do this and if you can take it earlier in the day. If you can't sleep  · Imagine yourself in a peaceful, pleasant scene. Focus on the details and feelings of being in a place that is relaxing. · Get up and do a quiet or boring activity until you feel sleepy. · Don't drink any liquids after 6 p.m. if you wake up often because you have to go to the bathroom. Where can you learn more? Go to http://uriel-percy.info/. Enter S659 in the search box to learn more about \"Learning About Sleeping Well. \"  Current as of: September 11, 2018  Content Version: 11.9  © 5913-9508 [employer]  Care instructions adapted under license by [employer] (which disclaims liability or warranty for this information). If you have questions about a medical condition or this instruction, always ask your healthcare professional. [address] any warranty or liability for your use of this information. Safe Use of Opioid Pain Medicine: Care Instructions  Your Care Instructions  Pain is your body's way of warning you that something is wrong. Pain feels different for everybody. Only you can describe your pain. A doctor can suggest or prescribe many types of medicines for pain. These range from over-the-counter medicines like acetaminophen (Tylenol) to powerful medicines called opioids. Examples of opioids are fentanyl, hydrocodone, morphine, and oxycodone. Heroin is an illegal opioid  Opioids are strong medicines. They can help you manage pain when you use them the right way. But if you misuse them, they can cause serious harm and even death. For these reasons, doctors are very careful about how they prescribe opioids. If you decide to take opioids, here are some things to remember. · Keep your doctor informed. You can get addicted to opioids. The risk is higher if you have a history of substance use. Your doctor will monitor you closely for signs of misuse and addiction and to figure out when you no longer need to take opioids. · Make a treatment plan. The goal of your plan is to be able to function and do the things you need to do, even if you still have some pain. You might be able to manage your pain with other non-opioid options like physical therapy, relaxation, or over-the-counter pain medicines. · Be aware of the side effects. Opioids can cause serious side effects, such as constipation, dry mouth, and nausea. And over time, you may need a higher dose to get pain relief. This is called tolerance. Your body also gets used to opioids. This is called physical dependence. If you suddenly stop taking them, you may have withdrawal symptoms.   The doctor carefully considered what pain medicine is right for you. You may not have received opioids if your doctor was concerned about drug interactions or your safety, or if he or she had other concerns. It is best to have one doctor or clinic treat your pain. This way you will get the pain medicine that will help you the most. And a doctor will be able to watch for any problems that the medicine might cause. The doctor has checked you carefully, but problems can develop later. If you notice any problems or new symptoms,  get medical treatment right away. Follow-up care is a key part of your treatment and safety. Be sure to make and go to all appointments, and call your doctor if you are having problems. It's also a good idea to know your test results and keep a list of the medicines you take. How can you care for yourself at home? If you need to take opioids to manage your pain, remember these safety tips. · Follow directions carefully. It's easy to misuse opioids if you take a dose other than what's prescribed by your doctor. This can lead to overdose and even death. Even sharing them with someone they weren't meant for is misuse. · Be cautious. Opioids may affect your judgment and decision making. Do not drive or operate machinery until you can think clearly. Talk with your doctor about when it is safe to drive. · Reduce the risk of drug interactions. Opioids can be dangerous if you take them with alcohol or with certain drugs like sleeping pills and muscle relaxers. Make sure your doctor knows about all the other medicines you take, including over-the-counter medicines. Don't start any new medicines before you talk to your doctor or pharmacist.  · Safely store and dispose of opioids. Store opioids in a safe and secure place. Make sure that pets, children, friends, and family can't get to them. When you're done using opioids, make sure to dispose of them safely and as quickly as possible. The [place]  Food and Drug Administration (FDA) recommends these disposal options. ? The best option is to take your medicine to a drop-off box or take-back program that is authorized by the [address] ([facility]). ? If these programs aren't available in your area and your medicine doesn't have specific disposal instructions (such as flushing), you can throw them into your household trash if you follow the FDA's instructions. Visit fda.gov and search for \"unused medicine disposal.\"  ? If you have opioid patches (used or unused), your options are to take them to a [facility]-authorized site or flush them down the toilet. Do not throw them in the trash. ? Only flush your medicine down the toilet if you can't get to a [facility]-approved site or your medicine instructions state clearly to flush them. · Reduce the risk of overdose. Misuse of opioids can be very dangerous. Protect yourself by asking your doctor about a naloxone rescue kit. It can help you--and even save your life--if you take too much of an opioid. Try other ways to reduce pain. · Relax, and reduce stress. Relaxation techniques such as deep breathing or meditation can help. · Keep moving. Gentle, daily exercise can help reduce pain over the long run. Try low- or no-impact exercises such as walking, swimming, and stationary biking. Do stretches to stay flexible. · Try heat, cold packs, and massage. · Get enough sleep. Pain can make you tired and drain your energy. Talk with your doctor if you have trouble sleeping because of pain. · Think positive. Your thoughts can affect your pain level. Do things that you enjoy to distract yourself when you have pain instead of focusing on the pain. See a movie, read a book, listen to music, or spend time with a friend. If you are not taking a prescription pain medicine, ask your doctor if you can take an over-the-counter medicine. When should you call for help?   Call your doctor now or seek immediate medical care if:    · You have a new kind of pain.     · You have new symptoms, such as a fever or rash, along with the pain.    Watch closely for changes in your health, and be sure to contact your doctor if:    · You think you might be using too much pain medicine, and you need help to use less or stop.     · Your pain gets worse.     · You would like a referral to a doctor or clinic that specializes in pain management. Where can you learn more? Go to http://uriel-percy.info/. Enter R108 in the search box to learn more about \"Safe Use of Opioid Pain Medicine: Care Instructions. \"  Current as of: Laura 3, 2018  Content Version: 11.9  © 9353-3917 [employer]. Care instructions adapted under license by [employer] (which disclaims liability or warranty for this information). If you have questions about a medical condition or this instruction, always ask your healthcare professional. [place] [address] any warranty or liability for your use of this information.

## 2019-07-24 ENCOUNTER — TELEPHONE (OUTPATIENT)
Dept: PAIN MANAGEMENT | Age: 54
End: 2019-07-24

## 2019-07-24 NOTE — TELEPHONE ENCOUNTER
The pt's order for a back brace, last office note, demographics, and workers' comp information were faxed over to [facility] for review. A fax confirmation was received and they will contact the pt's workers' comp and then contact the pt.

## 2019-07-25 ENCOUNTER — DOCUMENTATION ONLY (OUTPATIENT)
Dept: PAIN MANAGEMENT | Age: 54
End: 2019-07-25

## 2019-07-25 NOTE — PROGRESS NOTES
The pt's order for [employer], last office note and demographics were picked up by the [employer] rep. This is due to issues they had with their fax. They will work with the pt's workers' comp and call the to schedule an appt.

## 2019-08-21 ENCOUNTER — DOCUMENTATION ONLY (OUTPATIENT)
Dept: PAIN MANAGEMENT | Age: 54
End: 2019-08-21

## 2019-08-21 NOTE — PROGRESS NOTES
[employer]  called today alexandrea. Which stated that Dr. Law Zamora reports that the Flexeril the patient is taking is not medically necessary. I questioned the [employer].  asking who was Dr. Law Zamora? She informed me he is a Workmen's Comp. doctor. [facility]  the patient is taken Flexeril for her muscle spasms and I will discuss with patient next office visit if she feels that taking Flexeril twice a day is feasible.

## 2019-08-22 DIAGNOSIS — G89.4 CHRONIC PAIN SYNDROME: ICD-10-CM

## 2019-08-22 DIAGNOSIS — M50.30 DDD (DEGENERATIVE DISC DISEASE), CERVICAL: ICD-10-CM

## 2019-08-22 NOTE — TELEPHONE ENCOUNTER
[facility] has called requesting a refill of their controlled medication Oxycodone for the management of chronic pain syndrome     Last office visit date: 07/18/19  Last opioid care agreement 12/03/18  Last UDS was done 07/18/19    Date last  was pulled and reviewed : 08/22/19  Last fill date for medication was 07/29/19    Was the patient compliant when the above report was pulled? yes    Analgesia: 20%    Aberrancies: no    ADL's: patient has assistance from her family to complete her adls     Adverse Reaction: no    Provider's last note and plan of care reviewed? yes  Request forwarded to provider for review.

## 2019-08-23 RX ORDER — OXYCODONE HYDROCHLORIDE 10 MG/1
10 TABLET ORAL
Qty: 70 TAB | Refills: 0 | Status: SHIPPED | OUTPATIENT
Start: 2019-08-28 | End: 2019-09-19 | Stop reason: SDUPTHER

## 2019-09-19 DIAGNOSIS — M50.30 DDD (DEGENERATIVE DISC DISEASE), CERVICAL: ICD-10-CM

## 2019-09-19 DIAGNOSIS — G89.4 CHRONIC PAIN SYNDROME: ICD-10-CM

## 2019-09-19 NOTE — TELEPHONE ENCOUNTER
Name and : Verified  Phone number: [phone]  % of pain relief: 15-20%  Can't perform daily activities without help at all times  Side effects: No.

## 2019-09-20 NOTE — TELEPHONE ENCOUNTER
[facility] has called requesting a refill of their controlled medication, Oxycodone, for the management of chronic pain. Last office visit date: 7/18/19  Last opioid care agreement 12/3/19  Last UDS was done 7/18/19    Date last  was pulled and reviewed : 9/20/19  Last fill date for medication was 8/28/19    Was the patient compliant when the above report was pulled? yes    Analgesia: 15-20%    Aberrancies: none    ADL's: Has to have help with all ADL's    Adverse Reaction: none    Provider's last note and plan of care reviewed? yes  Request forwarded to provider for review.

## 2019-09-23 RX ORDER — OXYCODONE HYDROCHLORIDE 10 MG/1
10 TABLET ORAL
Qty: 70 TAB | Refills: 0 | Status: SHIPPED | OUTPATIENT
Start: 2019-09-27 | End: 2019-10-17 | Stop reason: SDUPTHER

## 2019-10-17 ENCOUNTER — OFFICE VISIT (OUTPATIENT)
Dept: PAIN MANAGEMENT | Age: 54
End: 2019-10-17

## 2019-10-17 VITALS
TEMPERATURE: 97.1 F | DIASTOLIC BLOOD PRESSURE: 93 MMHG | SYSTOLIC BLOOD PRESSURE: 148 MMHG | BODY MASS INDEX: 33.88 KG/M2 | HEART RATE: 76 BPM | RESPIRATION RATE: 16 BRPM | HEIGHT: 71 IN | WEIGHT: 242 LBS | OXYGEN SATURATION: 98 %

## 2019-10-17 DIAGNOSIS — Z79.899 ENCOUNTER FOR LONG-TERM (CURRENT) USE OF HIGH-RISK MEDICATION: ICD-10-CM

## 2019-10-17 DIAGNOSIS — G89.4 CHRONIC PAIN SYNDROME: ICD-10-CM

## 2019-10-17 DIAGNOSIS — M50.30 DDD (DEGENERATIVE DISC DISEASE), CERVICAL: Primary | ICD-10-CM

## 2019-10-17 DIAGNOSIS — M54.9 SPINE PAIN: ICD-10-CM

## 2019-10-17 DIAGNOSIS — M62.838 MUSCLE SPASM: ICD-10-CM

## 2019-10-17 RX ORDER — NAPROXEN 375 MG/1
375 TABLET, DELAYED RELEASE ORAL
Qty: 60 TAB | Refills: 2 | Status: SHIPPED | OUTPATIENT
Start: 2019-10-17 | End: 2019-11-27 | Stop reason: SDUPTHER

## 2019-10-17 RX ORDER — CYCLOBENZAPRINE HCL 5 MG
5 TABLET ORAL
Qty: 90 TAB | Refills: 2 | Status: SHIPPED | OUTPATIENT
Start: 2019-10-17 | End: 2019-11-27 | Stop reason: SDUPTHER

## 2019-10-17 RX ORDER — OXYCODONE HYDROCHLORIDE 10 MG/1
10 TABLET ORAL
Qty: 70 TAB | Refills: 0 | Status: SHIPPED | OUTPATIENT
Start: 2019-10-27 | End: 2019-11-26 | Stop reason: SDUPTHER

## 2019-10-17 RX ORDER — ACETAMINOPHEN 500 MG
TABLET ORAL
Qty: 200 TAB | Refills: 2 | Status: SHIPPED | OUTPATIENT
Start: 2019-10-17 | End: 2019-11-27 | Stop reason: SDUPTHER

## 2019-10-17 NOTE — PATIENT INSTRUCTIONS
Preventing Falls: Care Instructions Your Care Instructions Getting around your home safely can be a challenge if you have injuries or health problems that make it easy for you to fall. Loose rugs and furniture in walkways are among the dangers for many older people who have problems walking or who have poor eyesight. People who have conditions such as arthritis, osteoporosis, or dementia also have to be careful not to fall. You can make your home safer with a few simple measures. Follow-up care is a key part of your treatment and safety. Be sure to make and go to all appointments, and call your doctor if you are having problems. It's also a good idea to know your test results and keep a list of the medicines you take. How can you care for yourself at home? Taking care of yourself · You may get dizzy if you do not drink enough water. To prevent dehydration, drink plenty of fluids, enough so that your urine is light yellow or clear like water. Choose water and other caffeine-free clear liquids. If you have kidney, heart, or liver disease and have to limit fluids, talk with your doctor before you increase the amount of fluids you drink. · Exercise regularly to improve your strength, muscle tone, and balance. Walk if you can. Swimming may be a good choice if you cannot walk easily. · Have your vision and hearing checked each year or any time you notice a change. If you have trouble seeing and hearing, you might not be able to avoid objects and could lose your balance. · Know the side effects of the medicines you take. Ask your doctor or pharmacist whether the medicines you take can affect your balance. Sleeping pills or sedatives can affect your balance. · Limit the amount of alcohol you drink. Alcohol can impair your balance and other senses. · Ask your doctor whether calluses or corns on your feet need to be removed.  If you wear loose-fitting shoes because of calluses or corns, you can lose your balance and fall. · Talk to your doctor if you have numbness in your feet. Preventing falls at home · Remove raised doorway thresholds, throw rugs, and clutter. Repair loose carpet or raised areas in the floor. · Move furniture and electrical cords to keep them out of walking paths. · Use nonskid floor wax, and wipe up spills right away, especially on ceramic tile floors. · If you use a walker or cane, put rubber tips on it. If you use crutches, clean the bottoms of them regularly with an abrasive pad, such as steel wool. · Keep your house well lit, especially [place], and outside walkways. Use night-lights in areas such as hallways and bathrooms. Add extra light switches or use remote switches (such as switches that go on or off when you clap your hands) to make it easier to turn lights on if you have to get up during the night. · Install sturdy handrails on stairways. · Move items in your cabinets so that the things you use a lot are on the lower shelves (about waist level). · Keep a cordless phone and a flashlight with new batteries by your bed. If possible, put a phone in each of the main rooms of your house, or carry a cell phone in case you fall and cannot reach a phone. Or, you can wear a device around your neck or wrist. You push a button that sends a signal for help. · Wear low-heeled shoes that fit well and give your feet good support. Use footwear with nonskid soles. Check the heels and soles of your shoes for wear. Repair or replace worn heels or soles. · Do not wear socks without shoes on wood floors. · Walk on the grass when the sidewalks are slippery. If you live in an area that gets snow and ice in the winter, sprinkle salt on slippery steps and sidewalks. Preventing falls in the bath · Install grab bars and nonskid mats inside and outside your shower or tub and near the toilet and sinks. · Use shower chairs and bath benches. · Use a hand-held shower head that will allow you to sit while showering. · Get into a tub or shower by putting the weaker leg in first. Get out of a tub or shower with your strong side first. 
· Repair loose toilet seats and consider installing a raised toilet seat to make getting on and off the toilet easier. · Keep your bathroom door unlocked while you are in the shower. Where can you learn more? Go to http://uriel-percy.info/. Enter [id number] [id number] in the search box to learn more about \"Preventing Falls: Care Instructions. \" Current as of: November 7, 2018 Content Version: 12.2 © 2893-2631 [employer]. Care instructions adapted under license by [employer] (which disclaims liability or warranty for this information). If you have questions about a medical condition or this instruction, always ask your healthcare professional. [place] [address] any warranty or liability for your use of this information. Stopping Smoking: Care Instructions Your Care Instructions Cigarette smokers crave the nicotine in cigarettes. Giving it up is much harder than simply changing a habit. Your body has to stop craving the nicotine. It is hard to quit, but you can do it. There are many tools that people use to quit smoking. You may find that combining tools works best for you. There are several steps to quitting. First you get ready to quit. Then you get support to help you. After that, you learn new skills and behaviors to become a nonsmoker. For many people, a necessary step is getting and using medicine. Your doctor will help you set up the plan that best meets your needs. You may want to attend a smoking cessation program to help you quit smoking. When you choose a program, look for one that has proven success. Ask your doctor for ideas.  You will greatly increase your chances of success if you take medicine as well as get counseling or join a cessation program. 
 Some of the changes you feel when you first quit tobacco are uncomfortable. Your body will miss the nicotine at first, and you may feel short-tempered and grumpy. You may have trouble sleeping or concentrating. Medicine can help you deal with these symptoms. You may struggle with changing your smoking habits and rituals. The last step is the tricky one: Be prepared for the smoking urge to continue for a time. This is a lot to deal with, but keep at it. You will feel better. Follow-up care is a key part of your treatment and safety. Be sure to make and go to all appointments, and call your doctor if you are having problems. It's also a good idea to know your test results and keep a list of the medicines you take. How can you care for yourself at home? · Ask your family, friends, and coworkers for support. You have a better chance of quitting if you have help and support. · Join a support group, such as Nicotine Anonymous, for people who are trying to quit smoking. · Consider signing up for a smoking cessation program, such as the American [employer] Association's Freedom from Smoking program. 
· Get text messaging support. Go to the website at www.smokefree. gov to sign up for the [facility] program. 
· Set a quit date. Pick your date carefully so that it is not right in the middle of a big deadline or stressful time. Once you quit, do not even take a puff. Get rid of all ashtrays and lighters after your last cigarette. Clean your house and your clothes so that they do not smell of smoke. · Learn how to be a nonsmoker. Think about ways you can avoid those things that make you reach for a cigarette. ? Avoid situations that put you at greatest risk for smoking. For some people, it is hard to have a drink with friends without smoking. For others, they might skip a coffee break with coworkers who smoke. ? Change your daily routine. Take a different route to work or eat a meal in a different place. · Cut down on stress. Calm yourself or release tension by doing an activity you enjoy, such as reading a book, taking a hot bath, or gardening. · Talk to your doctor or pharmacist about nicotine replacement therapy, which replaces the nicotine in your body. You still get nicotine but you do not use tobacco. Nicotine replacement products help you slowly reduce the amount of nicotine you need. These products come in several forms, many of them available over-the-counter: ? Nicotine patches ? Nicotine gum and lozenges ? Nicotine inhaler · Ask your doctor about bupropion (Wellbutrin) or varenicline (Chantix), which are prescription medicines. They do not contain nicotine. They help you by reducing withdrawal symptoms, such as stress and anxiety. · Some people find hypnosis, acupuncture, and massage helpful for ending the smoking habit. · Eat a healthy diet and get regular exercise. Having healthy habits will help your body move past its craving for nicotine. · Be prepared to keep trying. Most people are not successful the first few times they try to quit. Do not get mad at yourself if you smoke again. Make a list of things you learned and think about when you want to try again, such as next week, next month, or next year. Where can you learn more? Go to http://uriel-percy.info/. Enter U830 in the search box to learn more about \"Stopping Smoking: Care Instructions. \" Current as of: September 26, 2018 Content Version: 12.2 © 1926-9546 [employer]. Care instructions adapted under license by [employer] (which disclaims liability or warranty for this information). If you have questions about a medical condition or this instruction, always ask your healthcare professional. [address] any warranty or liability for your use of this information.

## 2019-10-17 NOTE — PROGRESS NOTES
Nursing Notes    Patient presents to the office today in follow-up. Patient rates her pain at 9/10 on the numerical pain scale. Reviewed medications with counts as follows:    Rx Date filled Qty Dispensed Pill Count Last Dose Short   Oxycodone 10 mg tab 9/27/19 70 6 today no                                       reviewed YES  Any aberrancies noted on  NO  Last opioid agreement 1/2/13/18  Last urine drug screen 7/18/19    Comments:     POC UDS was not performed in office today    Any new labs or imaging since last appointment? NO    Have you been to an emergency room (ER) or urgent care clinic since your last visit? NO            Have you been hospitalized since your last visit? NO     If yes, where, when, and reason for visit? Have you seen or consulted any other health care providers outside of the [address] [place] [address]  since your last visit? YES,      If yes, where, when, and reason for visit? Ms. Robert Melendez has a reminder for a \"due or due soon\" health maintenance. I have asked that she contact her primary care provider for follow-up on this health maintenance.       3 most recent PHQ Screens 10/17/2019   Little interest or pleasure in doing things Not at all   Feeling down, depressed, irritable, or hopeless Not at all   Total Score PHQ 2 0   Trouble falling or staying asleep, or sleeping too much Nearly every day   Feeling tired or having little energy Nearly every day   Poor appetite, weight loss, or overeating Not at all   Feeling bad about yourself - or that you are a failure or have let yourself or your family down Not at all   Trouble concentrating on things such as school, work, reading, or watching TV Not at all   Moving or speaking so slowly that other people could have noticed; or the opposite being so fidgety that others notice Not at all   Thoughts of being better off dead, or hurting yourself in some way Not at all   PHQ 9 Score 6   How difficult have these problems made it for you to do your work, take care of your home and get along with others Very difficult

## 2019-10-17 NOTE — PROGRESS NOTES
Referred by Worker's Compensation for low back pain and neck pain. Calculated MEQ - 67.5  Naloxone rescue - yes  Prophylactic bowel program - yes  Date of last OCA 12/13/2018  Last UDS 07/18/19, consistent POC, awaiting confirmatory testing   date checked today, findings consistent      Today   Last Visit  Prior Visit(s)  ORT -       [facility] - 1 an 9/10 1 and 8  1 and 10    [facility] -73%  76%  80%     COMM -9  6  4       HPI:  Yudy Philippe is a 48 y.o. female here for f/u visit for ongoing evaluation of low back and neck pain. Pt was last seen here on 07/18/2019. Pt denies interval changes on the character or distribution of pain. Pain is located posterior neck bilateral trapezius, across lower back and right side of back with radiation down right lower extremity. The pain is described as throbbing,aching, burning, pins-and-needles, stabbing. Pain at its best is 7/10. Pain at its worse is 10/10. The pain is worsened by any kind of movement or activity. Symptoms are improved by only opioid medications. Pt has tried naproxen, lidocaine patches with no perceived benefit. Pt has never tried implantable pain pump, spinal cord stimulator. Occupational Therapy referral has not been obtained, pain psychology referral has not been completed, neuropsych eval has not been completed. Per Dr. Hess Foil note\"\"  --Home health referral was denied.  Referral to pain management placed last visit has not been executed. Requested x-rays not obtained, states they were instructed to,\"not worry about them\" so they did not have it done. Home exercise program for piriformis stretching has not been done.  Topicals Biofreeze and Aspercreme are mildly helpful. Pamelor 75 mg nightly helps with HA and sleep\"\"\"    Since last visit,Patient reports she has been unable to have follow up with pain psychology, neuropsych, occupational therapy or H wave due to her Workmen's Comp not approving them.     ROS:.  Denies fever, chills, nausea, vomiting, diarrhea, constipation, abdominal pain, chest pain, shortness or breath/trouble breathing, weakness, trouble swallowing, changes in vision, changes in hearing, falls, dizziness, bladder incontinence, bowel incontinence, depression, anxiety, suicidal ideations, homicidal ideations or alcohol use. Opioid specific risk: Subjective reports memory deficits, obesity, asthma, polypharmacy, long-term opioid therapy, depression, TBI, falls. Vitals:    10/17/19 1138   BP: (!) 148/93   Pulse: 76   Resp: 16   Temp: 97.1 °F (36.2 °C)   TempSrc: Oral   SpO2: 98%   Weight: 109.8 kg (242 lb)   Height: 5' 11\" (1.803 m)   PainSc:   9   PainLoc: Generalized        Physical exam:  AFVS elevated diastolic, patient asymptomatic, no acute distress, endomorphic body habitus. A&OXs 3. Normocephalic, atraumatic. Conjugate gaze, clear sclerae. EOM intact. Bilateral rounded shoulders with forward head posture. Slowed and chronic slurred speech. Mood is appropriate and patient is cooperative. Antalgic gait with decreased frances with assistive right-handed mono cane for gait and balance. Non-labored breathing. Even rise of chest wall.         Primary Care Physician  [facility] [address] [address] [phone]      [place] [id number] -- .  3 most recent PHQ Screens 10/17/2019   Little interest or pleasure in doing things Not at all   Feeling down, depressed, irritable, or hopeless Not at all   Total Score PHQ 2 0   Trouble falling or staying asleep, or sleeping too much Nearly every day   Feeling tired or having little energy Nearly every day   Poor appetite, weight loss, or overeating Not at all   Feeling bad about yourself - or that you are a failure or have let yourself or your family down Not at all   Trouble concentrating on things such as school, work, reading, or watching TV Not at all   Moving or speaking so slowly that other people could have noticed; or the opposite being so fidgety that others notice Not at all   Thoughts of being better off dead, or hurting yourself in some way Not at all   PHQ 9 Score 6   How difficult have these problems made it for you to do your work, take care of your home and get along with others Very difficult       Assessment/Plan:     ICD-10-CM ICD-9-CM    1. DDD (degenerative disc disease), cervical M50.30 722.4 REFERRAL TO PHYSIATRY      REFERRAL TO OCCUPATIONAL THERAPY      acetaminophen (TYLENOL) 500 mg tablet      oxyCODONE IR (ROXICODONE) 10 mg tab immediate release tablet   2. Spine pain M54.9 724.5 REFERRAL TO PHYSIATRY      REFERRAL TO OCCUPATIONAL THERAPY      acetaminophen (TYLENOL) 500 mg tablet   3. Chronic pain syndrome G89.4 338.4 REFERRAL TO PHYSIATRY      REFERRAL TO OCCUPATIONAL THERAPY      naproxen EC (EC-NAPROSYN) 375 mg TbEC      acetaminophen (TYLENOL) 500 mg tablet      oxyCODONE IR (ROXICODONE) 10 mg tab immediate release tablet   4. Encounter for long-term (current) use of high-risk medication Z79.899 V58.69    5. Muscle spasm M62.838 728.85 cyclobenzaprine (FLEXERIL) 5 mg tablet      --Patient follow-up with PCP regarding elevated blood pressure. --Patient given printed prescription for occupational therapy and in-home evaluation that was ordered previously and has not been obtained. Reordered Referral     --Referral for Dr Paul Del Castillo for evaluation for in home assistance      --Patient to continue regular follow-ups with neurology for her headaches, last visit was lat month. 1) Medications (opiod and non-opiod)  --I do not recommend long term opioid therapy for this patient at this time for their chronic pain; the risks outweigh the potential benefits. Pt currently taking oxycodone IR 10 mg tablets 1 tablet up to 3 times daily with total of 70 tablets of budgeted over 30 days. Their MME is 39. --Today, we will hold the weaning of patients opioid medication with a goal of being opioid free, pending safety and compliance.  Pt was educated on signs and symptoms of opioid withdrawal and advised to call the clinic should these symptoms arise so that we may provide support as needed. Pt given prescription for oxycodone IR 10 mg tablets 1 tablet up to 3 times daily with total of 70 tablets of budgeted over 30 days. Their MME is 39. --We will keep prescription the same ans reassess at next office visit. --Refill flexeril 5 mg 1 tab up TID as needed     --Refilled Tylenol up to 1000mg TID as needed for pain. --Refilled naproxen 375 mg BID as needed     2) Restorative Therapies  --Continue TENS unit as needed     --Continue BREGG back brace #627, to improve function and improve quality life    --Ordered H wave to be used for home use no substitute, patient reports the representitive is working on trying to get her the unit. 3) Interventional Procedures  --Patient request referral outside of clinic for interventional injections. --If patient remains with our practice she will be referred for evaluation for implantable pain pump, patient declines this option at this time. 4) Behavorial Health Approaches  --Patient will benefit from neuropsych evaluation pain psychology. 5) Complementary & Integrative Health  --Patient given handout information on smoking cessation. --Follow up ongoing assessment and ongoing development of integrative and comprehensive plan of care for chronic pain. Goals: To establish complementary and integrative plan of care to address chronic pain issues while minimizing pharmaceuticals to maximize patient's function improve quality of life. Education:  Patient again educated on the importance of strict compliance with the opioid care agreement while on opioid therapy. Patient also again educated that they should avoid driving while on chronic opioid therapy. Also advised to avoid alcohol and to avoid benzodiazepines while on opioid therapy. Handouts given regarding opioid safety and sleep health.       Follow-up and Dispositions    · Return in about 3 months (around 1/21/2020).               Social History     Socioeconomic History    Marital status:      Spouse name: Not on file    Number of children: Not on file    Years of education: Not on file    Highest education level: Not on file   Occupational History    Occupation: nurse assistant   Social Needs    Financial resource strain: Not on file    Food insecurity:     Worry: Not on file     Inability: Not on file    Transportation needs:     Medical: Not on file     Non-medical: Not on file   Tobacco Use    Smoking status: Former Smoker     Types: Cigarettes    Smokeless tobacco: Never Used    Tobacco comment: Smoke for 26 years   Substance and Sexual Activity    Alcohol use: No    Drug use: No    Sexual activity: Yes     Partners: Male     Birth control/protection: None   Lifestyle    Physical activity:     Days per week: Not on file     Minutes per session: Not on file    Stress: Not on file   Relationships    Social connections:     Talks on phone: Not on file     Gets together: Not on file     Attends Gnosticist service: Not on file     Active member of club or organization: Not on file     Attends meetings of clubs or organizations: Not on file     Relationship status: Not on file    Intimate partner violence:     Fear of current or ex partner: Not on file     Emotionally abused: Not on file     Physically abused: Not on file     Forced sexual activity: Not on file   Other Topics Concern    Not on file   Social History Narrative    Not on file     Family History   Problem Relation Age of Onset    Heart Attack Mother     Heart Disease Mother     Diabetes Mother     Pacemaker Mother     Stroke Father     Cancer Father     Hypertension Maternal Grandmother     Heart Attack Maternal Grandmother     Diabetes Maternal Grandmother     Cancer Maternal Grandmother     Hypertension Maternal Grandfather     Diabetes Maternal Grandfather     Hypertension Paternal Grandmother     Diabetes Paternal Grandmother     Breast Cancer Paternal Grandmother     Hypertension Paternal Grandfather     Diabetes Paternal Grandfather     Heart Attack Sister     Breast Cancer Maternal Aunt     Cancer Maternal Aunt     Breast Cancer Paternal Aunt     Cancer Maternal Uncle     Cancer Niece      Allergies   Allergen Reactions    Latex Rash    Lisinopril Cough, Swelling and Shortness of Breath     Ace Inhibitor - cough     Past Medical History:   Diagnosis Date    Abnormal nuclear cardiac imaging test 07/10/2012    Mod mid to distal anterior septal ischemia. EF 47%. Mid to distal anterior septal hypk. Normal EKG portion of stress test.  Intermediate high risk.  Asthma     Back pain     injury at work June 2014    Coronary atherosclerosis of native coronary artery     angiographically normal coronaries with dLAD bridging (july 2012)    Diabetes mellitus type II, controlled ([facility] [address]     Forgetfulness     since injury June 2014    Fracture of left humerus     GERD (gastroesophageal reflux disease)     Gout     Headache(784.0)     Hypertension     Lack of coordination     since injury June 2014    Neck pain     injury at work June 2014    Obesity     S/P cardiac cath 07/10/2012    dLAD w/mild to mod bridging. Otherwise patent coronaries. At least mod LVH. EF 65%. Past Surgical History:   Procedure Laterality Date    HX APPENDECTOMY      HX GI      Gallbladder    HX HEART CATHETERIZATION  7/10/2012    HX ORTHOPAEDIC      left arm     Current Outpatient Medications on File Prior to Visit   Medication Sig    nortriptyline (PAMELOR) 50 mg capsule Take 1 Cap by mouth daily.  nortriptyline (PAMELOR) 75 mg capsule Take 1 Cap by mouth nightly.  senna (SENNA) 8.6 mg tablet Take 1 Tab by mouth daily as needed for Constipation.  docusate sodium (COLACE) 100 mg capsule Take 1 Cap by mouth two (2) times daily as needed for Constipation.     spironolactone (ALDACTONE) 25 mg tablet Take 1 Tab by mouth two (2) times a day.  cloNIDine HCl (CATAPRES) 0.1 mg tablet TAKE ONE TABLET BY MOUTH TWICE DAILY    carvedilol (COREG) 25 mg tablet Take 1 Tab by mouth two (2) times daily (with meals).  TENS Units (TENS 504) gee Please provide patient with a TENS unit to use on her lumbosacral region and her cervicothoracic region for chronic pain issues. Indications: For chronic lumbar and cervical pain. Indications: Chronic Pain    tens unit electrodes (TENS UNITS ELECTRODES) 2X2 \" pads For chronic lumbar and cervical pain. Indications: Chronic Pain    metFORMIN (GLUCOPHAGE) 1,000 mg tablet Take 1 Tab by mouth two (2) times daily (with meals).  allopurinol (ZYLOPRIM) 100 mg tablet Take 1 Tab by mouth two (2) times a day.  oxybutynin (DITROPAN) 5 mg tablet Take 1 Tab by mouth two (2) times a day.  glucose blood VI test strips (RELION PRIME TEST STRIPS) strip Use as directed    albuterol (PROVENTIL HFA, VENTOLIN HFA, PROAIR HFA) 90 mcg/actuation inhaler Take 2 Puffs by inhalation every four (4) hours as needed for Wheezing.  OTHER Motorized Wheelchair    multivitamin (ONE A DAY) tablet Take 1 tablet by mouth daily.  [DISCONTINUED] oxyCODONE IR (ROXICODONE) 10 mg tab immediate release tablet Take 1 Tab by mouth every eight (8) hours as needed for Pain (#70 tabs to be budgeted over 30 days) for up to 30 days. Max Daily Amount: 30 mg.    [DISCONTINUED] naproxen EC (EC-NAPROSYN) 375 mg TbEC Take 1 Tab by mouth two (2) times daily as needed for Pain.  [DISCONTINUED] acetaminophen (TYLENOL) 500 mg tablet Up to 1000mg TID as needed for pain. Do not exceed 3000mg daily.  [DISCONTINUED] cyclobenzaprine (FLEXERIL) 5 mg tablet Take 1 Tab by mouth three (3) times daily as needed for Muscle Spasm(s).  naloxone 4 mg/actuation spry 4 mg by Nasal route as needed for up to 2 doses.  Indications: OPIOID TOXICITY     No current facility-administered medications on file prior to visit. [address] was used for portions of this report. Unintended errors may occur.

## 2019-11-08 ENCOUNTER — DOCUMENTATION ONLY (OUTPATIENT)
Dept: PAIN MANAGEMENT | Age: 54
End: 2019-11-08

## 2019-11-26 ENCOUNTER — TELEPHONE (OUTPATIENT)
Dept: PAIN MANAGEMENT | Age: 54
End: 2019-11-26

## 2019-11-26 ENCOUNTER — DOCUMENTATION ONLY (OUTPATIENT)
Dept: PAIN MANAGEMENT | Age: 54
End: 2019-11-26

## 2019-11-26 DIAGNOSIS — G89.4 CHRONIC PAIN SYNDROME: ICD-10-CM

## 2019-11-26 DIAGNOSIS — Z79.899 ENCOUNTER FOR LONG-TERM (CURRENT) USE OF HIGH-RISK MEDICATION: Primary | ICD-10-CM

## 2019-11-26 DIAGNOSIS — M50.30 DDD (DEGENERATIVE DISC DISEASE), CERVICAL: ICD-10-CM

## 2019-11-26 RX ORDER — PROMETHAZINE HYDROCHLORIDE 12.5 MG/1
12.5 TABLET ORAL
Qty: 12 TAB | Refills: 0 | Status: SHIPPED | OUTPATIENT
Start: 2019-11-26

## 2019-11-26 RX ORDER — OXYCODONE HYDROCHLORIDE 10 MG/1
10 TABLET ORAL
Qty: 70 TAB | Refills: 0 | Status: SHIPPED | OUTPATIENT
Start: 2019-11-27 | End: 2019-12-27

## 2019-11-26 RX ORDER — HYDROXYZINE 25 MG/1
25 TABLET, FILM COATED ORAL
Qty: 12 TAB | Refills: 0 | Status: SHIPPED | OUTPATIENT
Start: 2019-11-26

## 2019-11-26 RX ORDER — CLONIDINE HYDROCHLORIDE 0.1 MG/1
0.1 TABLET ORAL
Qty: 8 TAB | Refills: 0 | Status: SHIPPED | OUTPATIENT
Start: 2019-11-26

## 2019-11-26 NOTE — TELEPHONE ENCOUNTER
Lou Ding has called requesting a refill of their controlled medication, oxycodone, for the management of her chronic back pain. Last office visit date: 10/17/19  Last opioid care agreement 12/03/18  Last UDS was done 07/18/19    Date last  was pulled and reviewed : 11/26/19  Last fill date for medication was 10/28/19 for oxycodone 5 mg #70 tabs    Was the patient compliant when the above report was pulled? yes    Analgesia: pt reports a 50% pain relief with her current opioid medication regimen     Aberrancies: none noted     ADL's: pt reports that she is not able to perform any of her normal daily tasks due to pain and her disability. She requires help with everything from her family    Adverse Reaction: none reported by the pt at this time. Provider's last note and plan of care reviewed? yes  Request forwarded to provider for review.

## 2019-11-27 DIAGNOSIS — G89.4 CHRONIC PAIN SYNDROME: ICD-10-CM

## 2019-11-27 DIAGNOSIS — M62.838 MUSCLE SPASM: ICD-10-CM

## 2019-11-27 DIAGNOSIS — Z79.899 ENCOUNTER FOR LONG-TERM (CURRENT) USE OF HIGH-RISK MEDICATION: ICD-10-CM

## 2019-11-27 DIAGNOSIS — M50.30 DDD (DEGENERATIVE DISC DISEASE), CERVICAL: ICD-10-CM

## 2019-11-27 DIAGNOSIS — M54.9 SPINE PAIN: ICD-10-CM

## 2019-11-27 RX ORDER — NAPROXEN 375 MG/1
375 TABLET, DELAYED RELEASE ORAL
Qty: 60 TAB | Refills: 2 | Status: SHIPPED | OUTPATIENT
Start: 2019-11-27

## 2019-11-27 RX ORDER — DOCUSATE SODIUM 100 MG/1
100 CAPSULE, LIQUID FILLED ORAL
Qty: 60 CAP | Refills: 2 | Status: SHIPPED | OUTPATIENT
Start: 2019-11-27

## 2019-11-27 RX ORDER — CYCLOBENZAPRINE HCL 5 MG
5 TABLET ORAL
Qty: 90 TAB | Refills: 2 | Status: SHIPPED | OUTPATIENT
Start: 2019-11-27

## 2019-11-27 RX ORDER — ACETAMINOPHEN 500 MG
TABLET ORAL
Qty: 200 TAB | Refills: 2 | Status: SHIPPED | OUTPATIENT
Start: 2019-11-27

## 2019-11-27 RX ORDER — SENNOSIDES 8.6 MG/1
1 TABLET ORAL
Qty: 30 TAB | Refills: 2 | Status: SHIPPED | OUTPATIENT
Start: 2019-11-27

## 2019-11-27 NOTE — PROGRESS NOTES
Patient provided with withdrawal cocktail:  Phenergan 25mg, one PO Q8hrs PRN nausea, vomiting #12  Atarax 25mg, one PO Q8hrs PRN anxiety, pruritus, irritation #12  Clonidine 0.1mg, one PO Q12hrs PRN sweating, restlessness, hot flashes #12     \"\"Faxed [facility] Closure Letter Faxed to [facility] with case# and Confirmation Received. \"\"11/26/19    Today patient provided with refills for:  - Colace  -Senna  -Tylenol  -Flexeril  - Naproxen    Today patient is provided with oxycodone IR 10 mg tablets up to 3 times daily with 70 tablets provided for 30 days. They were informed of the planned clinic closure of 12/12/19 and that this will be the last prescription of narcotic provided from this clinic. Patient was educated on signs and symptoms of opioid withdrawal and a withdrawal cocktail was provided to be used if needed. They were also advised they can go to the nearest ED for further withdrawal support if needed. She will work with w/dc  to determine where to go next and then will call to inform [facility] if referral is needed. They were advised to call their primary care provider today to inform them of the planned clinic closure so that they may anticipate providing support for her pain while awaiting establishment at the new clinic. Patient and her son Josh Steven expressed understanding and agreement with the plan.

## 2019-11-29 NOTE — PROGRESS NOTES
Patient will continue Colace and senna as needed for constipation. Continue naproxen as needed  Continue Tylenol as needed  Continue Flexeril as needed          Today patient and her son, Ryan Fernández, is provided with oxycodone immediate release 10 mg tablets up to 3 times daily with 70 tablets provided for 30 days. They were informed of the planned clinic closure of 12/12/19 and that this will be the last prescription of narcotic provided from this clinic. Patient and her son were  educated on signs and symptoms of opioid withdrawal and a withdrawal cocktail was provided to be used if needed. They were also advised they can go to the nearest ED for further withdrawal support if needed. They were referred to Dr Cierra Araujo at previous visit for continuing pain care. They were advised to call their primary care provider today to inform them of the planned clinic closure so that they may anticipate providing support for her pain while awaiting establishment at the new clinic. Her Worker's Compensation has been contacted about the planned closure on 12/12/2019 may have been asked to help her establish ongoing care as soon as possible to minimize any gaps in her care. Patient and her son expressed understanding and agreement with the plan.     Withdrawal cocktail:  Phenergan 25mg, one PO Q8hrs PRN nausea, vomiting #12  Atarax 25mg, one PO Q8hrs PRN anxiety, pruritus, irritation #12  Clonidine 0.1mg, one PO Q12hrs PRN sweating, restlessness, hot flashes #8

## 2019-12-04 ENCOUNTER — HOSPITAL ENCOUNTER (OUTPATIENT)
Dept: LAB | Age: 54
Discharge: HOME | End: 2019-12-04
Payer: MEDICARE

## 2019-12-04 DIAGNOSIS — E11.9 DIABETES MELLITUS (HCC): ICD-10-CM

## 2019-12-04 DIAGNOSIS — N39.3 FEMALE STRESS INCONTINENCE: ICD-10-CM

## 2019-12-04 DIAGNOSIS — E78.00 PURE HYPERCHOLESTEROLEMIA: ICD-10-CM

## 2019-12-04 DIAGNOSIS — R47.1 DYSARTHROSIS: ICD-10-CM

## 2019-12-04 DIAGNOSIS — Z87.820 HISTORY OF TRAUMATIC BRAIN INJURY: ICD-10-CM

## 2019-12-04 DIAGNOSIS — G89.4 CHRONIC PAIN SYNDROME: ICD-10-CM

## 2019-12-04 DIAGNOSIS — I10 ESSENTIAL HYPERTENSION, BENIGN: ICD-10-CM

## 2019-12-04 LAB
25(OH)D3 SERPL-MCNC: 29.4 NG/ML (ref 30–100)
ALBUMIN SERPL-MCNC: 4.2 G/DL (ref 3.4–5)
ALBUMIN/GLOB SERPL: 1.3 {RATIO} (ref 0.8–1.7)
ALP SERPL-CCNC: 66 U/L (ref 45–117)
ALT SERPL-CCNC: 30 U/L (ref 13–56)
ANION GAP SERPL CALC-SCNC: 1 MMOL/L (ref 3–18)
AST SERPL-CCNC: 13 U/L (ref 10–38)
BASOPHILS # BLD: 0 K/UL (ref 0–0.1)
BASOPHILS NFR BLD: 0 % (ref 0–2)
BILIRUB SERPL-MCNC: 0.2 MG/DL (ref 0.2–1)
BUN SERPL-MCNC: 16 MG/DL (ref 7–18)
BUN/CREAT SERPL: 20 (ref 12–20)
CALCIUM SERPL-MCNC: 9.5 MG/DL (ref 8.5–10.1)
CHLORIDE SERPL-SCNC: 109 MMOL/L (ref 100–111)
CHOLEST SERPL-MCNC: 181 MG/DL
CO2 SERPL-SCNC: 28 MMOL/L (ref 21–32)
CREAT SERPL-MCNC: 0.79 MG/DL (ref 0.6–1.3)
CREAT UR-MCNC: 236 MG/DL (ref 30–125)
DIFFERENTIAL METHOD BLD: ABNORMAL
EOSINOPHIL # BLD: 0.2 K/UL (ref 0–0.4)
EOSINOPHIL NFR BLD: 3 % (ref 0–5)
ERYTHROCYTE [DISTWIDTH] IN BLOOD BY AUTOMATED COUNT: 14.5 % (ref 11.6–14.5)
EST. AVERAGE GLUCOSE BLD GHB EST-MCNC: 120 MG/DL
GLOBULIN SER CALC-MCNC: 3.3 G/DL (ref 2–4)
GLUCOSE SERPL-MCNC: 89 MG/DL (ref 74–99)
HBA1C MFR BLD: 5.8 % (ref 4.2–5.6)
HCT VFR BLD AUTO: 38.7 % (ref 35–45)
HDLC SERPL-MCNC: 47 MG/DL (ref 40–60)
HDLC SERPL: 3.9 {RATIO} (ref 0–5)
HGB BLD-MCNC: 13.2 G/DL (ref 12–16)
LDLC SERPL CALC-MCNC: 112.2 MG/DL (ref 0–100)
LIPID PROFILE,FLP: ABNORMAL
LYMPHOCYTES # BLD: 0.9 K/UL (ref 0.9–3.6)
LYMPHOCYTES NFR BLD: 15 % (ref 21–52)
MCH RBC QN AUTO: 24.9 PG (ref 24–34)
MCHC RBC AUTO-ENTMCNC: 34.1 G/DL (ref 31–37)
MCV RBC AUTO: 72.9 FL (ref 74–97)
MICROALBUMIN UR-MCNC: 3.97 MG/DL (ref 0–3)
MICROALBUMIN/CREAT UR-RTO: 17 MG/G (ref 0–30)
MONOCYTES # BLD: 0.6 K/UL (ref 0.05–1.2)
MONOCYTES NFR BLD: 10 % (ref 3–10)
NEUTS SEG # BLD: 4.3 K/UL (ref 1.8–8)
NEUTS SEG NFR BLD: 72 % (ref 40–73)
PLATELET # BLD AUTO: 218 K/UL (ref 135–420)
PMV BLD AUTO: 11.7 FL (ref 9.2–11.8)
POTASSIUM SERPL-SCNC: 4.8 MMOL/L (ref 3.5–5.5)
PROT SERPL-MCNC: 7.5 G/DL (ref 6.4–8.2)
RBC # BLD AUTO: 5.31 M/UL (ref 4.2–5.3)
SODIUM SERPL-SCNC: 138 MMOL/L (ref 136–145)
TRIGL SERPL-MCNC: 109 MG/DL (ref ?–150)
TSH SERPL DL<=0.05 MIU/L-ACNC: 1.39 UIU/ML (ref 0.36–3.74)
VLDLC SERPL CALC-MCNC: 21.8 MG/DL
WBC # BLD AUTO: 6.1 K/UL (ref 4.6–13.2)

## 2019-12-04 PROCEDURE — 83036 HEMOGLOBIN GLYCOSYLATED A1C: CPT

## 2019-12-04 PROCEDURE — 80053 COMPREHEN METABOLIC PANEL: CPT

## 2019-12-04 PROCEDURE — 36415 COLL VENOUS BLD VENIPUNCTURE: CPT

## 2019-12-04 PROCEDURE — 82043 UR ALBUMIN QUANTITATIVE: CPT

## 2019-12-04 PROCEDURE — 80061 LIPID PANEL: CPT

## 2019-12-04 PROCEDURE — 84443 ASSAY THYROID STIM HORMONE: CPT

## 2019-12-04 PROCEDURE — 85025 COMPLETE CBC W/AUTO DIFF WBC: CPT

## 2019-12-04 PROCEDURE — 82306 VITAMIN D 25 HYDROXY: CPT

## 2019-12-04 PROCEDURE — 84436 ASSAY OF TOTAL THYROXINE: CPT

## 2019-12-05 DIAGNOSIS — G89.4 CHRONIC PAIN SYNDROME: ICD-10-CM

## 2019-12-05 DIAGNOSIS — M54.9 SPINE PAIN: ICD-10-CM

## 2019-12-05 DIAGNOSIS — M50.30 DDD (DEGENERATIVE DISC DISEASE), CERVICAL: Primary | ICD-10-CM

## 2019-12-05 LAB — T4 SERPL-MCNC: 9.3 UG/DL (ref 4.8–13.9)

## 2019-12-06 ENCOUNTER — DOCUMENTATION ONLY (OUTPATIENT)
Dept: PAIN MANAGEMENT | Age: 54
End: 2019-12-06

## 2019-12-06 NOTE — PROGRESS NOTES
Order for referral , demographic sheet and last office notes faxed to Dr. Tess Fan per request and confirmation received.

## 2019-12-10 ENCOUNTER — TELEPHONE (OUTPATIENT)
Dept: ORTHOPEDIC SURGERY | Age: 54
End: 2019-12-10

## 2019-12-10 NOTE — TELEPHONE ENCOUNTER
Ms. Diego Hagen is a current patient at pain management in [facility] that will be closing 12/12/19. She stated she was given a referral from them to resume care with [facility].  I informed her that we do not have a pain management group but would check to see if there is another group we could refer her to. Please advise.     Thomas # [phone]

## 2019-12-10 NOTE — TELEPHONE ENCOUNTER
[facility] has already placed a referral to Dr. Soledad Torres for PM. Should I inform patient to await their call? Please advise.

## 2019-12-11 ENCOUNTER — TELEPHONE (OUTPATIENT)
Dept: ORTHOPEDIC SURGERY | Age: 54
End: 2019-12-11

## 2019-12-11 NOTE — TELEPHONE ENCOUNTER
Pt came in today to inform Dr Arturo Gaines that she got a letter from pain mgnt stating that they will be closing down. She is asking to be referred to someone who is taking new patients asap. Pt also signed a release form for her records to be sent to the new person that she will be referred to. .. Yaniv Self I have scanned it into her chart. Please advise.

## 2019-12-11 NOTE — TELEPHONE ENCOUNTER
Spoke with patient, informed of NP Sweede message below. Patient stated understanding, no further actions needed at this time.

## 2019-12-11 NOTE — TELEPHONE ENCOUNTER
Returned to call patient, Reached unidentified voicemail, left message, identified myself/facility/callback number, requested return call to facility. If patient returns call to facility, please inform her of message below as per CELIA Lion.

## 2019-12-11 NOTE — TELEPHONE ENCOUNTER
Pt should have been referred to a different PM provider by her previous PM, we are not referring people

## 2020-01-30 ENCOUNTER — DOCUMENTATION ONLY (OUTPATIENT)
Dept: NEUROLOGY | Age: 55
End: 2020-01-30

## 2020-01-30 NOTE — PROGRESS NOTES
[employer]y [employer] claims request fax to [employer]  and fax confirmation received. Docs sent to [employer] to be scanned.

## 2020-02-07 ENCOUNTER — DOCUMENTATION ONLY (OUTPATIENT)
Dept: NEUROLOGY | Age: 55
End: 2020-02-07

## 2020-02-07 NOTE — PROGRESS NOTES
[facility] request for medical documentation received and faxed to [facility]. Fax confirmation sent to [employer] to be scanned.

## 2020-11-20 ENCOUNTER — HOSPITAL ENCOUNTER (OUTPATIENT)
Dept: LAB | Age: 55
Discharge: HOME | End: 2020-11-20
Payer: MEDICARE

## 2020-11-20 LAB
25(OH)D3 SERPL-MCNC: 32.4 NG/ML (ref 30–100)
ALBUMIN SERPL-MCNC: 4.3 G/DL (ref 3.4–5)
ALBUMIN/GLOB SERPL: 1.3 {RATIO} (ref 0.8–1.7)
ALP SERPL-CCNC: 68 U/L (ref 45–117)
ALT SERPL-CCNC: 28 U/L (ref 13–56)
ANION GAP SERPL CALC-SCNC: 5 MMOL/L (ref 3–18)
AST SERPL-CCNC: 13 U/L (ref 10–38)
BASOPHILS # BLD: 0 K/UL (ref 0–0.1)
BASOPHILS NFR BLD: 0 % (ref 0–2)
BILIRUB SERPL-MCNC: 0.3 MG/DL (ref 0.2–1)
BUN SERPL-MCNC: 14 MG/DL (ref 7–18)
BUN/CREAT SERPL: 19 (ref 12–20)
CALCIUM SERPL-MCNC: 9.8 MG/DL (ref 8.5–10.1)
CHLORIDE SERPL-SCNC: 108 MMOL/L (ref 100–111)
CHOLEST SERPL-MCNC: 197 MG/DL
CO2 SERPL-SCNC: 29 MMOL/L (ref 21–32)
CREAT SERPL-MCNC: 0.74 MG/DL (ref 0.6–1.3)
CREAT UR-MCNC: 157 MG/DL (ref 30–125)
DIFFERENTIAL METHOD BLD: ABNORMAL
EOSINOPHIL # BLD: 0.2 K/UL (ref 0–0.4)
EOSINOPHIL NFR BLD: 3 % (ref 0–5)
ERYTHROCYTE [DISTWIDTH] IN BLOOD BY AUTOMATED COUNT: 14.3 % (ref 11.6–14.5)
GLOBULIN SER CALC-MCNC: 3.4 G/DL (ref 2–4)
GLUCOSE SERPL-MCNC: 77 MG/DL (ref 74–99)
HBA1C MFR BLD: 5.8 % (ref 4.2–5.6)
HCT VFR BLD AUTO: 40.7 % (ref 35–45)
HDLC SERPL-MCNC: 53 MG/DL (ref 40–60)
HDLC SERPL: 3.7 {RATIO} (ref 0–5)
HGB BLD-MCNC: 13.9 G/DL (ref 12–16)
LDLC SERPL CALC-MCNC: 122.8 MG/DL (ref 0–100)
LIPID PROFILE,FLP: ABNORMAL
LYMPHOCYTES # BLD: 0.9 K/UL (ref 0.9–3.6)
LYMPHOCYTES NFR BLD: 16 % (ref 21–52)
MCH RBC QN AUTO: 25 PG (ref 24–34)
MCHC RBC AUTO-ENTMCNC: 34.2 G/DL (ref 31–37)
MCV RBC AUTO: 73.3 FL (ref 74–97)
MICROALBUMIN UR-MCNC: 2.6 MG/DL (ref 0–3)
MICROALBUMIN/CREAT UR-RTO: 17 MG/G (ref 0–30)
MONOCYTES # BLD: 0.6 K/UL (ref 0.05–1.2)
MONOCYTES NFR BLD: 10 % (ref 3–10)
NEUTS SEG # BLD: 4 K/UL (ref 1.8–8)
NEUTS SEG NFR BLD: 71 % (ref 40–73)
PLATELET # BLD AUTO: 208 K/UL (ref 135–420)
PMV BLD AUTO: 11.6 FL (ref 9.2–11.8)
POTASSIUM SERPL-SCNC: 4.2 MMOL/L (ref 3.5–5.5)
PROT SERPL-MCNC: 7.7 G/DL (ref 6.4–8.2)
RBC # BLD AUTO: 5.55 M/UL (ref 4.2–5.3)
SODIUM SERPL-SCNC: 142 MMOL/L (ref 136–145)
TRIGL SERPL-MCNC: 106 MG/DL (ref ?–150)
TSH SERPL DL<=0.05 MIU/L-ACNC: 1.01 UIU/ML (ref 0.36–3.74)
VLDLC SERPL CALC-MCNC: 21.2 MG/DL
WBC # BLD AUTO: 5.6 K/UL (ref 4.6–13.2)

## 2020-11-20 PROCEDURE — 84436 ASSAY OF TOTAL THYROXINE: CPT

## 2020-11-20 PROCEDURE — 82306 VITAMIN D 25 HYDROXY: CPT

## 2020-11-20 PROCEDURE — 85025 COMPLETE CBC W/AUTO DIFF WBC: CPT

## 2020-11-20 PROCEDURE — 82043 UR ALBUMIN QUANTITATIVE: CPT

## 2020-11-20 PROCEDURE — 80053 COMPREHEN METABOLIC PANEL: CPT

## 2020-11-20 PROCEDURE — 84443 ASSAY THYROID STIM HORMONE: CPT

## 2020-11-20 PROCEDURE — 36415 COLL VENOUS BLD VENIPUNCTURE: CPT

## 2020-11-20 PROCEDURE — 83036 HEMOGLOBIN GLYCOSYLATED A1C: CPT

## 2020-11-20 PROCEDURE — 80061 LIPID PANEL: CPT

## 2020-11-21 LAB — T4 SERPL-MCNC: 9 UG/DL (ref 4.8–13.9)

## 2021-05-18 ENCOUNTER — TRANSCRIBE ORDER (OUTPATIENT)
Dept: SCHEDULING | Age: 56
End: 2021-05-18

## 2021-05-18 DIAGNOSIS — Z12.31 VISIT FOR SCREENING MAMMOGRAM: Primary | ICD-10-CM

## 2021-06-09 ENCOUNTER — HOSPITAL ENCOUNTER (OUTPATIENT)
Dept: MAMMOGRAPHY | Age: 56
Discharge: HOME | End: 2021-06-09
Attending: INTERNAL MEDICINE
Payer: MEDICARE

## 2021-06-09 DIAGNOSIS — Z12.31 VISIT FOR SCREENING MAMMOGRAM: ICD-10-CM

## 2021-06-09 PROCEDURE — 77067 SCR MAMMO BI INCL CAD: CPT

## 2021-11-29 ENCOUNTER — TRANSCRIBE ORDER (OUTPATIENT)
Dept: SCHEDULING | Age: 56
End: 2021-11-29

## 2021-11-29 DIAGNOSIS — Z12.31 VISIT FOR SCREENING MAMMOGRAM: Primary | ICD-10-CM

## 2022-01-07 ENCOUNTER — HOSPITAL ENCOUNTER (OUTPATIENT)
Dept: LAB | Age: 57
Discharge: HOME | End: 2022-01-07

## 2022-01-07 LAB — XX-LABCORP SPECIMEN COL,LCBCF: NORMAL

## 2022-01-07 PROCEDURE — 99001 SPECIMEN HANDLING PT-LAB: CPT

## 2022-03-19 PROBLEM — M54.50 CHRONIC MIDLINE LOW BACK PAIN: Status: ACTIVE | Noted: 2017-06-19

## 2022-03-19 PROBLEM — G89.29 CHRONIC MIDLINE LOW BACK PAIN: Status: ACTIVE | Noted: 2017-06-19

## 2022-07-14 NOTE — PROGRESS NOTES
Ajith Mancuso is a 59 year old male presents today for   Chief Complaint   Patient presents with   • Back Pain     About a week ago       Ajith Mancuso is a 59 year old male presenting with patient verbalizes he hurt his back about a week ago. Patient notes he was wrestling with a puppy. Medications taken include ibuprofen and baclofen with gave zero to minimal relief. Patient has some difficulty with sitting, standing, and bending; \"Its too much!\"    Denies Latex allergy or sensitivity      Medications: medications verified and updated    ALLERGIES:  No Known Allergies    PCP: Luz Vazquez, DO    Visit Vitals  /57 (BP Location: LUE - Left upper extremity, Patient Position: Sitting)   Pulse 60   Temp 97.6 °F (36.4 °C) (Temporal)   Resp 16   Ht 5' 10\" (1.778 m)   Wt 80.9 kg (178 lb 4.8 oz)   SpO2 100%   BMI 25.58 kg/m²       Patient here with shanti    Patient would not like their family or friends informed of their status during treatment.   [id number]}    Patient would like communication of their results via:      Cell Phone:   Telephone Information:   Mobile [phone]     Okay to leave a message containing results? Yes    Health Maintenance Due   Topic Date Due   • Pneumococcal Vaccine 0-64 (2 - PCV) 01/05/2019        Nursing Notes    Patient presents to the office today in follow-up. Reviewed medications with counts as follows:    Rx Date filled Qty Dispensed Pill Count Last Dose Short   Hydromorphone er 12 mg 3/1/17 30 2 yesterday no   Oxycodone 15 mg 3/13/17 120 54 today no   Ms. Rangel has a reminder for a \"due or due soon\" health maintenance. I have asked that she contact her primary care provider for follow-up on this health maintenance. POC UDS was not performed in office today    Any new labs or imaging since last appointment? NO    Have you been to an emergency room (ER) or urgent care clinic since your last visit? NO            Have you been hospitalized since your last visit? NO     If yes, where, when, and reason for visit? Have you seen or consulted any other health care providers outside of the [address] [place] [address]  since your last visit? NO     If yes, where, when, and reason for visit?

## 2024-02-07 ENCOUNTER — HOSPITAL ENCOUNTER (OUTPATIENT)
Facility: HOSPITAL | Age: 59
Discharge: HOME | End: 2024-02-10

## 2024-02-07 LAB — LABCORP SPECIMEN COLLECTION: NORMAL

## 2024-03-25 ENCOUNTER — HOSPITAL ENCOUNTER (OUTPATIENT)
Facility: HOSPITAL | Age: 59
Discharge: HOME | End: 2024-03-28
Payer: MEDICARE

## 2024-03-25 DIAGNOSIS — N64.59 INVERTED NIPPLE: ICD-10-CM

## 2024-03-25 PROCEDURE — G0279 TOMOSYNTHESIS, MAMMO: HCPCS

## 2024-05-07 NOTE — PATIENT INSTRUCTIONS
Learning About Sleeping Well  What does sleeping well mean? Sleeping well means getting enough sleep. How much sleep is enough varies among people. The number of hours you sleep is not as important as how you feel when you wake up. If you do not feel refreshed, you probably need more sleep. Another sign of not getting enough sleep is feeling tired during the day. The average total nightly sleep time is 7½ to 8 hours. Healthy adults may need a little more or a little less than this. Why is getting enough sleep important? Getting enough quality sleep is a basic part of good health. When your sleep suffers, your mood and your thoughts can suffer too. You may find yourself feeling more grumpy or stressed. Not getting enough sleep also can lead to serious problems, including injury, accidents, anxiety, and depression. What might cause poor sleeping? Many things can cause sleep problems, including:  · Stress. Stress can be caused by fear about a single event, such as giving a speech. Or you may have ongoing stress, such as worry about work or school. · Depression, anxiety, and other mental or emotional conditions. · Changes in your sleep habits or surroundings. This includes changes that happen where you sleep, such as noise, light, or sleeping in a different bed. It also includes changes in your sleep pattern, such as having jet lag or working a late shift. · Health problems, such as pain, breathing problems, and restless legs syndrome. · Lack of regular exercise. How can you help yourself? Here are some tips that may help you sleep more soundly and wake up feeling more refreshed. Your sleeping area  · Use your bedroom only for sleeping and sex. A bit of light reading may help you fall asleep. But if it doesn't, do your reading elsewhere in the house. Don't watch TV in bed. · Be sure your bed is big enough to stretch out comfortably, especially if you have a sleep partner.   · Keep your bedroom quiet, dark, and cool. Use curtains, blinds, or a sleep mask to block out light. To block out noise, use earplugs, soothing music, or a \"white noise\" machine. Your evening and bedtime routine  · Create a relaxing bedtime routine. You might want to take a warm shower or bath, listen to soothing music, or drink a cup of noncaffeinated tea. · Go to bed at the same time every night. And get up at the same time every morning, even if you feel tired. What to avoid  · Limit caffeine (coffee, tea, caffeinated sodas) during the day, and don't have any for at least 4 to 6 hours before bedtime. · Don't drink alcohol before bedtime. Alcohol can cause you to wake up more often during the night. · Don't smoke or use tobacco, especially in the evening. Nicotine can keep you awake. · Don't take naps during the day, especially close to bedtime. · Don't lie in bed awake for too long. If you can't fall asleep, or if you wake up in the middle of the night and can't get back to sleep within 15 minutes or so, get out of bed and go to another room until you feel sleepy. · Don't take medicine right before bed that may keep you awake or make you feel hyper or energized. Your doctor can tell you if your medicine may do this and if you can take it earlier in the day. If you can't sleep  · Imagine yourself in a peaceful, pleasant scene. Focus on the details and feelings of being in a place that is relaxing. · Get up and do a quiet or boring activity until you feel sleepy. · Don't drink any liquids after 6 p.m. if you wake up often because you have to go to the bathroom. Where can you learn more? Go to http://uriel-percy.info/. Enter P374 in the search box to learn more about \"Learning About Sleeping Well. \"  Current as of: September 11, 2018  Content Version: 11.9  © 7865-4188 [employer], [email or web address].  Care instructions adapted under license by [employer] (which disclaims liability or warranty for this information). If you have questions about a medical condition or this instruction, always ask your healthcare professional. [address] any warranty or liability for your use of this information. Safe Use of Opioid Pain Medicine: Care Instructions  Your Care Instructions  Pain is your body's way of warning you that something is wrong. Pain feels different for everybody. Only you can describe your pain. A doctor can suggest or prescribe many types of medicines for pain. These range from over-the-counter medicines like acetaminophen (Tylenol) to powerful medicines called opioids. Examples of opioids are fentanyl, hydrocodone, morphine, and oxycodone. Heroin is an illegal opioid  Opioids are strong medicines. They can help you manage pain when you use them the right way. But if you misuse them, they can cause serious harm and even death. For these reasons, doctors are very careful about how they prescribe opioids. If you decide to take opioids, here are some things to remember. · Keep your doctor informed. You can get addicted to opioids. The risk is higher if you have a history of substance use. Your doctor will monitor you closely for signs of misuse and addiction and to figure out when you no longer need to take opioids. · Make a treatment plan. The goal of your plan is to be able to function and do the things you need to do, even if you still have some pain. You might be able to manage your pain with other non-opioid options like physical therapy, relaxation, or over-the-counter pain medicines. · Be aware of the side effects. Opioids can cause serious side effects, such as constipation, dry mouth, and nausea. And over time, you may need a higher dose to get pain relief. This is called tolerance. Your body also gets used to opioids. This is called physical dependence. If you suddenly stop taking them, you may have withdrawal symptoms.   The doctor carefully considered what pain medicine is right for you. You may not have received opioids if your doctor was concerned about drug interactions or your safety, or if he or she had other concerns. It is best to have one doctor or clinic treat your pain. This way you will get the pain medicine that will help you the most. And a doctor will be able to watch for any problems that the medicine might cause. The doctor has checked you carefully, but problems can develop later. If you notice any problems or new symptoms,  get medical treatment right away. Follow-up care is a key part of your treatment and safety. Be sure to make and go to all appointments, and call your doctor if you are having problems. It's also a good idea to know your test results and keep a list of the medicines you take. How can you care for yourself at home? If you need to take opioids to manage your pain, remember these safety tips. · Follow directions carefully. It's easy to misuse opioids if you take a dose other than what's prescribed by your doctor. This can lead to overdose and even death. Even sharing them with someone they weren't meant for is misuse. · Be cautious. Opioids may affect your judgment and decision making. Do not drive or operate machinery until you can think clearly. Talk with your doctor about when it is safe to drive. · Reduce the risk of drug interactions. Opioids can be dangerous if you take them with alcohol or with certain drugs like sleeping pills and muscle relaxers. Make sure your doctor knows about all the other medicines you take, including over-the-counter medicines. Don't start any new medicines before you talk to your doctor or pharmacist.  · Safely store and dispose of opioids. Store opioids in a safe and secure place. Make sure that pets, children, friends, and family can't get to them. When you're done using opioids, make sure to dispose of them safely and as quickly as possible. The [place]  Food and Drug Administration (FDA) recommends these disposal options. ? The best option is to take your medicine to a drop-off box or take-back program that is authorized by the [address] (CATHI). ? If these programs aren't available in your area and your medicine doesn't have specific disposal instructions (such as flushing), you can throw them into your household trash if you follow the FDA's instructions. Visit fda.gov and search for \"unused medicine disposal.\"  ? If you have opioid patches (used or unused), your options are to take them to a C[employer]I-authorized site or flush them down the toilet. Do not throw them in the trash. ? Only flush your medicine down the toilet if you can't get to a CATHI-approved site or your medicine instructions state clearly to flush them. · Reduce the risk of overdose. Misuse of opioids can be very dangerous. Protect yourself by asking your doctor about a naloxone rescue kit. It can help you--and even save your life--if you take too much of an opioid. Try other ways to reduce pain. · Relax, and reduce stress. Relaxation techniques such as deep breathing or meditation can help. · Keep moving. Gentle, daily exercise can help reduce pain over the long run. Try low- or no-impact exercises such as walking, swimming, and stationary biking. Do stretches to stay flexible. · Try heat, cold packs, and massage. · Get enough sleep. Pain can make you tired and drain your energy. Talk with your doctor if you have trouble sleeping because of pain. · Think positive. Your thoughts can affect your pain level. Do things that you enjoy to distract yourself when you have pain instead of focusing on the pain. See a movie, read a book, listen to music, or spend time with a friend. If you are not taking a prescription pain medicine, ask your doctor if you can take an over-the-counter medicine. When should you call for help?   Call your doctor now or seek immediate medical care if:    · You have a new kind of pain.     · You have new symptoms, such as a fever or rash, along with the pain.    Watch closely for changes in your health, and be sure to contact your doctor if:    · You think you might be using too much pain medicine, and you need help to use less or stop.     · Your pain gets worse.     · You would like a referral to a doctor or clinic that specializes in pain management. Where can you learn more? Go to http://uriel-percy.info/. Enter R108 in the search box to learn more about \"Safe Use of Opioid Pain Medicine: Care Instructions. \"  Current as of: Laura 3, 2018  Content Version: 11.9  © 5644-3905 [employer]. Care instructions adapted under license by [employer] (which disclaims liability or warranty for this information). If you have questions about a medical condition or this instruction, always ask your healthcare professional. [place] [address] any warranty or liability for your use of this information. Safe Use of Opioid Pain Medicine: Care Instructions  Your Care Instructions  Pain is your body's way of warning you that something is wrong. Pain feels different for everybody. Only you can describe your pain. A doctor can suggest or prescribe many types of medicines for pain. These range from over-the-counter medicines like acetaminophen (Tylenol) to powerful medicines called opioids. Examples of opioids are fentanyl, hydrocodone, morphine, and oxycodone. Heroin is an illegal opioid  Opioids are strong medicines. They can help you manage pain when you use them the right way. But if you misuse them, they can cause serious harm and even death. For these reasons, doctors are very careful about how they prescribe opioids. If you decide to take opioids, here are some things to remember. · Keep your doctor informed. You can get addicted to opioids. The risk is higher if you have a history of substance use.  Your doctor will monitor you closely for signs of misuse and addiction and to figure out when you no longer need to take opioids. · Make a treatment plan. The goal of your plan is to be able to function and do the things you need to do, even if you still have some pain. You might be able to manage your pain with other non-opioid options like physical therapy, relaxation, or over-the-counter pain medicines. · Be aware of the side effects. Opioids can cause serious side effects, such as constipation, dry mouth, and nausea. And over time, you may need a higher dose to get pain relief. This is called tolerance. Your body also gets used to opioids. This is called physical dependence. If you suddenly stop taking them, you may have withdrawal symptoms. The doctor carefully considered what pain medicine is right for you. You may not have received opioids if your doctor was concerned about drug interactions or your safety, or if he or she had other concerns. It is best to have one doctor or clinic treat your pain. This way you will get the pain medicine that will help you the most. And a doctor will be able to watch for any problems that the medicine might cause. The doctor has checked you carefully, but problems can develop later. If you notice any problems or new symptoms,  get medical treatment right away. Follow-up care is a key part of your treatment and safety. Be sure to make and go to all appointments, and call your doctor if you are having problems. It's also a good idea to know your test results and keep a list of the medicines you take. How can you care for yourself at home? If you need to take opioids to manage your pain, remember these safety tips. · Follow directions carefully. It's easy to misuse opioids if you take a dose other than what's prescribed by your doctor. This can lead to overdose and even death. Even sharing them with someone they weren't meant for is misuse. · Be cautious.  Opioids may affect your judgment and decision making. Do not drive or operate machinery until you can think clearly. Talk with your doctor about when it is safe to drive. · Reduce the risk of drug interactions. Opioids can be dangerous if you take them with alcohol or with certain drugs like sleeping pills and muscle relaxers. Make sure your doctor knows about all the other medicines you take, including over-the-counter medicines. Don't start any new medicines before you talk to your doctor or pharmacist.  · Safely store and dispose of opioids. Store opioids in a safe and secure place. Make sure that pets, children, friends, and family can't get to them. When you're done using opioids, make sure to dispose of them safely and as quickly as possible. The U.S. Food and Drug Administration (FDA) recommends these disposal options. ? The best option is to take your medicine to a drop-off box or take-back program that is authorized by the [employer] ([facility]ATHI). ? If these programs aren't available in your area and your medicine doesn't have specific disposal instructions (such as flushing), you can throw them into your household trash if you follow the FDA's instructions. Visit fda.gov and search for \"unused medicine disposal.\"  ? If you have opioid patches (used or unused), your options are to take them to a CATHI-authorized site or flush them down the toilet. Do not throw them in the trash. ? Only flush your medicine down the toilet if you can't get to a CATHI-approved site or your medicine instructions state clearly to flush them. · Reduce the risk of overdose. Misuse of opioids can be very dangerous. Protect yourself by asking your doctor about a naloxone rescue kit. It can help you--and even save your life--if you take too much of an opioid. Try other ways to reduce pain. · Relax, and reduce stress. Relaxation techniques such as deep breathing or meditation can help. · Keep moving.  Gentle, daily exercise can help reduce pain over the long run. Try low- or no-impact exercises such as walking, swimming, and stationary biking. Do stretches to stay flexible. · Try heat, cold packs, and massage. · Get enough sleep. Pain can make you tired and drain your energy. Talk with your doctor if you have trouble sleeping because of pain. · Think positive. Your thoughts can affect your pain level. Do things that you enjoy to distract yourself when you have pain instead of focusing on the pain. See a movie, read a book, listen to music, or spend time with a friend. If you are not taking a prescription pain medicine, ask your doctor if you can take an over-the-counter medicine. When should you call for help? Call your doctor now or seek immediate medical care if:    · You have a new kind of pain.     · You have new symptoms, such as a fever or rash, along with the pain.    Watch closely for changes in your health, and be sure to contact your doctor if:    · You think you might be using too much pain medicine, and you need help to use less or stop.     · Your pain gets worse.     · You would like a referral to a doctor or clinic that specializes in pain management. Where can you learn more? Go to http://uriel-percy.info/. Enter R108 in the search box to learn more about \"Safe Use of Opioid Pain Medicine: Care Instructions. \"  Current as of: Laura 3, 2018  Content Version: 11.9  © 1166-9462 [employer]. Care instructions adapted under license by [employer] (which disclaims liability or warranty for this information). If you have questions about a medical condition or this instruction, always ask your healthcare professional. [address] any warranty or liability for your use of this information. warm and dry/color normal/normal/no rashes/no ulcers

## 2025-03-26 ENCOUNTER — TRANSCRIBE ORDERS (OUTPATIENT)
Facility: HOSPITAL | Age: 60
End: 2025-03-26

## 2025-03-26 ENCOUNTER — HOSPITAL ENCOUNTER (OUTPATIENT)
Facility: HOSPITAL | Age: 60
Discharge: HOME | End: 2025-03-29
Payer: MEDICARE

## 2025-03-26 DIAGNOSIS — N39.3 FEMALE STRESS INCONTINENCE: ICD-10-CM

## 2025-03-26 DIAGNOSIS — J45.30 MILD PERSISTENT ASTHMA, UNSPECIFIED WHETHER COMPLICATED: ICD-10-CM

## 2025-03-26 DIAGNOSIS — E78.5 HYPERLIPIDEMIA, UNSPECIFIED HYPERLIPIDEMIA TYPE: ICD-10-CM

## 2025-03-26 DIAGNOSIS — E11.9 DIABETES MELLITUS WITHOUT COMPLICATION: ICD-10-CM

## 2025-03-26 DIAGNOSIS — Z87.820 HISTORY OF TRAUMATIC BRAIN INJURY: ICD-10-CM

## 2025-03-26 DIAGNOSIS — R47.1 DYSARTHRIA: ICD-10-CM

## 2025-03-26 DIAGNOSIS — M67.873 OTHER SPECIFIED DISORDERS OF TENDON, RIGHT ANKLE AND FOOT: ICD-10-CM

## 2025-03-26 DIAGNOSIS — M10.9 GOUT WITH MANIFESTATIONS: ICD-10-CM

## 2025-03-26 DIAGNOSIS — Z12.4 CERVICAL CANCER SCREENING: ICD-10-CM

## 2025-03-26 DIAGNOSIS — E66.9 OBESITY, UNSPECIFIED CLASS, UNSPECIFIED OBESITY TYPE, UNSPECIFIED WHETHER SERIOUS COMORBIDITY PRESENT: ICD-10-CM

## 2025-03-26 DIAGNOSIS — N64.59 INVERTED NIPPLE: ICD-10-CM

## 2025-03-26 DIAGNOSIS — N64.52 NIPPLE DISCHARGE: ICD-10-CM

## 2025-03-26 DIAGNOSIS — I10 HYPERTENSION, UNSPECIFIED TYPE: ICD-10-CM

## 2025-03-26 DIAGNOSIS — Z12.4 CERVICAL CANCER SCREENING: Primary | ICD-10-CM

## 2025-03-26 LAB
ALBUMIN SERPL-MCNC: 4 G/DL (ref 3.4–5)
ALBUMIN/GLOB SERPL: 1.4 (ref 0.8–1.7)
ALP SERPL-CCNC: 86 U/L (ref 45–117)
ALT SERPL-CCNC: 36 U/L (ref 13–56)
ANION GAP SERPL CALC-SCNC: 6 MMOL/L (ref 3–18)
AST SERPL-CCNC: 21 U/L (ref 10–38)
BASOPHILS # BLD: 0.02 K/UL (ref 0–0.1)
BASOPHILS NFR BLD: 0.4 % (ref 0–2)
BILIRUB SERPL-MCNC: 0.3 MG/DL (ref 0.2–1)
BUN SERPL-MCNC: 13 MG/DL (ref 7–18)
BUN/CREAT SERPL: 14 (ref 12–20)
CALCIUM SERPL-MCNC: 9.4 MG/DL (ref 8.5–10.1)
CHLORIDE SERPL-SCNC: 105 MMOL/L (ref 100–111)
CHOLEST SERPL-MCNC: 167 MG/DL
CO2 SERPL-SCNC: 25 MMOL/L (ref 21–32)
CREAT SERPL-MCNC: 0.96 MG/DL (ref 0.6–1.3)
DIFFERENTIAL METHOD BLD: ABNORMAL
EOSINOPHIL # BLD: 0.1 K/UL (ref 0–0.4)
EOSINOPHIL NFR BLD: 1.9 % (ref 0–5)
ERYTHROCYTE [DISTWIDTH] IN BLOOD BY AUTOMATED COUNT: 14 % (ref 11.6–14.5)
EST. AVERAGE GLUCOSE BLD GHB EST-MCNC: 154 MG/DL
GLOBULIN SER CALC-MCNC: 2.8 G/DL (ref 2–4)
GLUCOSE SERPL-MCNC: 160 MG/DL (ref 74–99)
HBA1C MFR BLD: 7 % (ref 4.2–5.6)
HCT VFR BLD AUTO: 39.1 % (ref 35–45)
HDLC SERPL-MCNC: 43 MG/DL (ref 40–60)
HDLC SERPL: 3.9 (ref 0–5)
HGB BLD-MCNC: 12.7 G/DL (ref 12–16)
IMM GRANULOCYTES # BLD AUTO: 0.01 K/UL (ref 0–0.04)
IMM GRANULOCYTES NFR BLD AUTO: 0.2 % (ref 0–0.5)
LDLC SERPL CALC-MCNC: 83.2 MG/DL (ref 0–100)
LIPID PANEL: ABNORMAL
LYMPHOCYTES # BLD: 0.97 K/UL (ref 0.9–3.6)
LYMPHOCYTES NFR BLD: 18 % (ref 21–52)
MCH RBC QN AUTO: 24.6 PG (ref 24–34)
MCHC RBC AUTO-ENTMCNC: 32.5 G/DL (ref 31–37)
MCV RBC AUTO: 75.6 FL (ref 78–100)
MONOCYTES # BLD: 0.56 K/UL (ref 0.05–1.2)
MONOCYTES NFR BLD: 10.4 % (ref 3–10)
NEUTS SEG # BLD: 3.73 K/UL (ref 1.8–8)
NEUTS SEG NFR BLD: 69.1 % (ref 40–73)
NRBC # BLD: 0 K/UL (ref 0–0.01)
NRBC BLD-RTO: 0 PER 100 WBC
PLATELET # BLD AUTO: 230 K/UL (ref 135–420)
PMV BLD AUTO: 12.7 FL (ref 9.2–11.8)
POTASSIUM SERPL-SCNC: 4.2 MMOL/L (ref 3.5–5.5)
PROT SERPL-MCNC: 6.8 G/DL (ref 6.4–8.2)
RBC # BLD AUTO: 5.17 M/UL (ref 4.2–5.3)
SODIUM SERPL-SCNC: 136 MMOL/L (ref 136–145)
T4 SERPL-MCNC: 8.9 UG/DL (ref 4.8–13.9)
TRIGL SERPL-MCNC: 204 MG/DL
TSH SERPL DL<=0.05 MIU/L-ACNC: 0.91 UIU/ML (ref 0.36–3.74)
VLDLC SERPL CALC-MCNC: 40.8 MG/DL
WBC # BLD AUTO: 5.4 K/UL (ref 4.6–13.2)

## 2025-03-26 PROCEDURE — 80053 COMPREHEN METABOLIC PANEL: CPT

## 2025-03-26 PROCEDURE — 84436 ASSAY OF TOTAL THYROXINE: CPT

## 2025-03-26 PROCEDURE — 85025 COMPLETE CBC W/AUTO DIFF WBC: CPT

## 2025-03-26 PROCEDURE — 83036 HEMOGLOBIN GLYCOSYLATED A1C: CPT

## 2025-03-26 PROCEDURE — 80061 LIPID PANEL: CPT

## 2025-03-26 PROCEDURE — 36415 COLL VENOUS BLD VENIPUNCTURE: CPT

## 2025-03-26 PROCEDURE — 84443 ASSAY THYROID STIM HORMONE: CPT

## 2025-04-24 ENCOUNTER — HOSPITAL ENCOUNTER (EMERGENCY)
Facility: HOSPITAL | Age: 60
Discharge: HOME | End: 2025-04-24
Attending: EMERGENCY MEDICINE
Payer: MEDICARE

## 2025-04-24 VITALS
SYSTOLIC BLOOD PRESSURE: 150 MMHG | BODY MASS INDEX: 32.2 KG/M2 | OXYGEN SATURATION: 98 % | DIASTOLIC BLOOD PRESSURE: 103 MMHG | WEIGHT: 230 LBS | TEMPERATURE: 98.3 F | HEART RATE: 90 BPM | HEIGHT: 71 IN | RESPIRATION RATE: 20 BRPM

## 2025-04-24 DIAGNOSIS — G89.29 ACUTE ON CHRONIC LOW BACK PAIN: Primary | ICD-10-CM

## 2025-04-24 DIAGNOSIS — M54.32 SCIATICA OF LEFT SIDE: ICD-10-CM

## 2025-04-24 DIAGNOSIS — M54.50 ACUTE ON CHRONIC LOW BACK PAIN: Primary | ICD-10-CM

## 2025-04-24 DIAGNOSIS — M48.00 SPINAL STENOSIS, UNSPECIFIED SPINAL REGION: ICD-10-CM

## 2025-04-24 PROCEDURE — 96372 THER/PROPH/DIAG INJ SC/IM: CPT

## 2025-04-24 PROCEDURE — 6360000002 HC RX W HCPCS

## 2025-04-24 PROCEDURE — 99284 EMERGENCY DEPT VISIT MOD MDM: CPT

## 2025-04-24 RX ORDER — KETOROLAC TROMETHAMINE 15 MG/ML
30 INJECTION, SOLUTION INTRAMUSCULAR; INTRAVENOUS ONCE
Status: COMPLETED | OUTPATIENT
Start: 2025-04-24 | End: 2025-04-24

## 2025-04-24 RX ADMIN — KETOROLAC TROMETHAMINE 30 MG: 15 INJECTION, SOLUTION INTRAMUSCULAR; INTRAVENOUS at 03:17

## 2025-04-24 ASSESSMENT — PAIN DESCRIPTION - DESCRIPTORS
DESCRIPTORS: ACHING
DESCRIPTORS: DISCOMFORT

## 2025-04-24 ASSESSMENT — PAIN - FUNCTIONAL ASSESSMENT
PAIN_FUNCTIONAL_ASSESSMENT: 0-10
PAIN_FUNCTIONAL_ASSESSMENT: ACTIVITIES ARE NOT PREVENTED

## 2025-04-24 ASSESSMENT — PAIN DESCRIPTION - ORIENTATION
ORIENTATION: LEFT
ORIENTATION: LEFT

## 2025-04-24 ASSESSMENT — PAIN SCALES - GENERAL
PAINLEVEL_OUTOF10: 10
PAINLEVEL_OUTOF10: 10

## 2025-04-24 ASSESSMENT — PAIN DESCRIPTION - LOCATION
LOCATION: BACK;LEG
LOCATION: BACK;LEG

## 2025-04-24 ASSESSMENT — PAIN DESCRIPTION - FREQUENCY: FREQUENCY: CONTINUOUS

## 2025-04-24 ASSESSMENT — PAIN DESCRIPTION - PAIN TYPE: TYPE: ACUTE PAIN

## 2025-04-24 NOTE — ED PROVIDER NOTES
[facility] EMERGENCY DEPARTMENT  EMERGENCY DEPARTMENT ENCOUNTER      Pt Name: Marta Mcfadden  MRN: [id number]  Birthdate 1965  Date of evaluation: 4/24/2025  Provider: Pascual Blanco MD  3:53 AM    CHIEF COMPLAINT       Chief Complaint   Patient presents with    Back Pain         HISTORY OF PRESENT ILLNESS    Marta Mcfadden is a 59 y.o. female w/ pmhx spinal stenosis w/ chronic low back pain with sciatica, chronic pain syndrome, CAD, HTN, HLD, T2DM, who presents to the emergency department with three days of acute on chronic low back pain.  The past 3 days she has noticed an acute worsening of her low back pain with left-sided sciatica, no changes to the quality of the pain but the pain has been 10 out of 10, normally baseline is 6-7 out of 10.  Denies any fevers or chills.  No history of spinal surgeries and no spinal hardware.  Denies any recent surgeries or procedures.  No lower extremity weakness or numbness, no paresthesias, no bowel or bladder incontinence, feels she is emptying her bladder completely without any difficulty, no saddle anesthesia.  She has been taking her pain management medications as prescribed without improvement.  Last saw her pain management specialist just 2 weeks ago, has appointment to be seen again in 1 week.  She denies any abdominal pain, no nausea or vomiting, no hematochezia or melena, no constipation or diarrhea, no dysuria, no hematuria, no changes in urinary frequency or urgency, no chest pain, no shortness of breath, no fevers or chills.      4/10/25 Pain Management     Patients overall pain remains stable and unchanged. There are no reported new symptoms. She states that she never received Butrans 5mcg patch. We will refill Butrans, Pamelor and Oxycodone. No change to medications today as they do improve patient function without adverse effects. Prescriptions were refilled and dated appropriately based on patient's . UDS obtained and pending confirmation. RTC  spinal stenosis w/ chronic low back pain with sciatica, chronic pain syndrome, CAD, HTN, HLD, T2DM, who presents to the emergency department with three days of acute on chronic low back pain.  Triage vitals notable for mild hypertension, otherwise unremarkable.  Physical exam unremarkable, no focal neurodeficits, no lower extremity weakness, sensation intact in bilateral lower extremities, no midline spinal tenderness, patient ambulating with steady gait.    This patient presents with back pain most consistent with acute on chronic spinal stenosis with sciatica. Differential diagnoses includes lumbago versus musculoskeletal spasm / strain versus sciatica. No back pain red flags on history or physical. Presentation not consistent with malignancy (lack of history of malignancy, lack of B symptoms), fracture (no trauma, no bony tenderness to palpation), cauda equina (no bowel or urinary incontinence/retention, no saddle anesthesia, no distal weakness), AAA (no abdominal pain or tenderness, no nausea or vomiting, no history of AAA), viscus perforation, osteomyelitis or epidural abscess (no IVDU, vertebral tenderness), renal colic, pyelonephritis (afebrile, no CVAT, no urinary symptoms). Given the clinical picture, no indication for imaging at this time.     Given patient is on strict opioid pain contract with pain management specialist, did not give opioids here today.  Will give 1 dose of IM Toradol.  Had lengthy discussion with patient on multimodal pain treatment, instructed her to follow-up with behavioral health for chronic pain CBT as well as with chronic pain specialist for possible procedures.  Educated on multimodal pain treatments and expectation management.    The patient verbalized agreement and understanding of the treatment plan and feels safe to go home at this time. ER return precautions provided for persistent or worsening symptoms and additional details as listed in patient discharge instructions. The

## 2025-04-24 NOTE — DISCHARGE INSTRUCTIONS
It was a pleasure to meet you today. You came to the Emergency Room for back pain. The results of your history, physical exam, labs, and imaging were not concerning for an emergent process requiring immediate intervention. Please return to the Emergency Room if you notice persistent of worsening symptoms. Specifically, please return if you have new weakness of your legs, inability to walk, feel like you are not emptying your bladder, have new numbness of your legs.    Please try to establish care with a mental health provider or therapist who specializes in chronic pain management.     Try: [email or web address]    Please follow-up with your chronic pain management specialist ASAP.     Please also try heat or cold therapy as tolerated.  You may also try vibration devices such as the \"Buzzy\" on amazon.

## 2025-04-24 NOTE — ED TRIAGE NOTES
Pt arrived via Triage in a wheelchair states she is in pain management for her back and is having increased pain that radiates down her left leg down to her foot.

## 2025-06-05 ENCOUNTER — HOSPITAL ENCOUNTER (OUTPATIENT)
Facility: HOSPITAL | Age: 60
Discharge: HOME | End: 2025-06-08
Payer: MEDICARE

## 2025-06-05 VITALS — BODY MASS INDEX: 32.2 KG/M2 | WEIGHT: 230 LBS | HEIGHT: 71 IN

## 2025-06-05 DIAGNOSIS — Z12.31 VISIT FOR SCREENING MAMMOGRAM: ICD-10-CM

## 2025-06-05 PROCEDURE — 77063 BREAST TOMOSYNTHESIS BI: CPT
